# Patient Record
Sex: MALE | Race: WHITE | NOT HISPANIC OR LATINO | Employment: PART TIME | URBAN - METROPOLITAN AREA
[De-identification: names, ages, dates, MRNs, and addresses within clinical notes are randomized per-mention and may not be internally consistent; named-entity substitution may affect disease eponyms.]

---

## 2018-01-26 ENCOUNTER — OFFICE VISIT CONVERTED (OUTPATIENT)
Dept: FAMILY MEDICINE CLINIC | Facility: CLINIC | Age: 53
End: 2018-01-26
Attending: NURSE PRACTITIONER

## 2018-02-14 ENCOUNTER — OFFICE VISIT CONVERTED (OUTPATIENT)
Dept: FAMILY MEDICINE CLINIC | Facility: CLINIC | Age: 53
End: 2018-02-14
Attending: NURSE PRACTITIONER

## 2018-02-16 ENCOUNTER — CONVERSION ENCOUNTER (OUTPATIENT)
Dept: FAMILY MEDICINE CLINIC | Facility: CLINIC | Age: 53
End: 2018-02-16

## 2018-02-16 ENCOUNTER — OFFICE VISIT CONVERTED (OUTPATIENT)
Dept: FAMILY MEDICINE CLINIC | Facility: CLINIC | Age: 53
End: 2018-02-16
Attending: NURSE PRACTITIONER

## 2018-04-13 ENCOUNTER — CONVERSION ENCOUNTER (OUTPATIENT)
Dept: OTOLARYNGOLOGY | Facility: CLINIC | Age: 53
End: 2018-04-13

## 2018-04-13 ENCOUNTER — OFFICE VISIT CONVERTED (OUTPATIENT)
Dept: OTOLARYNGOLOGY | Facility: CLINIC | Age: 53
End: 2018-04-13
Attending: OTOLARYNGOLOGY

## 2018-05-11 ENCOUNTER — OFFICE VISIT CONVERTED (OUTPATIENT)
Dept: OTOLARYNGOLOGY | Facility: CLINIC | Age: 53
End: 2018-05-11
Attending: OTOLARYNGOLOGY

## 2018-06-08 ENCOUNTER — CONVERSION ENCOUNTER (OUTPATIENT)
Dept: CARDIOLOGY | Facility: CLINIC | Age: 53
End: 2018-06-08

## 2018-06-08 ENCOUNTER — OFFICE VISIT CONVERTED (OUTPATIENT)
Dept: CARDIOLOGY | Facility: CLINIC | Age: 53
End: 2018-06-08
Attending: SPECIALIST

## 2018-07-17 ENCOUNTER — CONVERSION ENCOUNTER (OUTPATIENT)
Dept: CARDIOLOGY | Facility: CLINIC | Age: 53
End: 2018-07-17
Attending: SPECIALIST

## 2018-07-23 ENCOUNTER — OFFICE VISIT CONVERTED (OUTPATIENT)
Dept: PULMONOLOGY | Facility: CLINIC | Age: 53
End: 2018-07-23
Attending: INTERNAL MEDICINE

## 2018-10-03 ENCOUNTER — OFFICE VISIT CONVERTED (OUTPATIENT)
Dept: PULMONOLOGY | Facility: CLINIC | Age: 53
End: 2018-10-03
Attending: PHYSICIAN ASSISTANT

## 2018-10-24 ENCOUNTER — OFFICE VISIT CONVERTED (OUTPATIENT)
Dept: FAMILY MEDICINE CLINIC | Facility: CLINIC | Age: 53
End: 2018-10-24
Attending: NURSE PRACTITIONER

## 2018-12-03 ENCOUNTER — OFFICE VISIT CONVERTED (OUTPATIENT)
Dept: PULMONOLOGY | Facility: CLINIC | Age: 53
End: 2018-12-03
Attending: PHYSICIAN ASSISTANT

## 2019-03-01 ENCOUNTER — OFFICE VISIT CONVERTED (OUTPATIENT)
Dept: PULMONOLOGY | Facility: CLINIC | Age: 54
End: 2019-03-01
Attending: INTERNAL MEDICINE

## 2019-05-07 ENCOUNTER — OFFICE VISIT CONVERTED (OUTPATIENT)
Dept: FAMILY MEDICINE CLINIC | Facility: CLINIC | Age: 54
End: 2019-05-07
Attending: NURSE PRACTITIONER

## 2019-05-08 ENCOUNTER — HOSPITAL ENCOUNTER (OUTPATIENT)
Dept: FAMILY MEDICINE CLINIC | Facility: CLINIC | Age: 54
Discharge: HOME | End: 2019-05-08
Attending: NURSE PRACTITIONER

## 2019-05-08 LAB
25(OH)D3 SERPL-MCNC: 41.1 NG/ML (ref 30–100)
ALBUMIN SERPL-MCNC: 4.8 G/DL (ref 3.5–5)
ALBUMIN/GLOB SERPL: 2 {RATIO} (ref 1.4–2.6)
ALP SERPL-CCNC: 51 U/L (ref 56–119)
ALT SERPL-CCNC: 22 U/L (ref 10–40)
ANION GAP SERPL CALC-SCNC: 18 MMOL/L (ref 8–19)
AST SERPL-CCNC: 30 U/L (ref 15–50)
BASOPHILS # BLD AUTO: 0.07 10*3/UL (ref 0–0.2)
BASOPHILS NFR BLD AUTO: 1 % (ref 0–3)
BILIRUB SERPL-MCNC: 0.32 MG/DL (ref 0.2–1.3)
BUN SERPL-MCNC: 9 MG/DL (ref 5–25)
BUN/CREAT SERPL: 10 {RATIO} (ref 6–20)
CALCIUM SERPL-MCNC: 9.5 MG/DL (ref 8.7–10.4)
CHLORIDE SERPL-SCNC: 95 MMOL/L (ref 99–111)
CHOLEST SERPL-MCNC: 177 MG/DL (ref 107–200)
CHOLEST/HDLC SERPL: 2.5 {RATIO} (ref 3–6)
CONV ABS IMM GRAN: 0.03 10*3/UL (ref 0–0.2)
CONV CO2: 27 MMOL/L (ref 22–32)
CONV IMMATURE GRAN: 0.4 % (ref 0–1.8)
CONV TOTAL PROTEIN: 7.2 G/DL (ref 6.3–8.2)
CREAT UR-MCNC: 0.87 MG/DL (ref 0.7–1.2)
DEPRECATED RDW RBC AUTO: 44.7 FL (ref 35.1–43.9)
EOSINOPHIL # BLD AUTO: 0.37 10*3/UL (ref 0–0.7)
EOSINOPHIL # BLD AUTO: 5.1 % (ref 0–7)
ERYTHROCYTE [DISTWIDTH] IN BLOOD BY AUTOMATED COUNT: 12.7 % (ref 11.6–14.4)
FOLATE SERPL-MCNC: 8.1 NG/ML (ref 4.8–20)
GFR SERPLBLD BASED ON 1.73 SQ M-ARVRAT: >60 ML/MIN/{1.73_M2}
GLOBULIN UR ELPH-MCNC: 2.4 G/DL (ref 2–3.5)
GLUCOSE SERPL-MCNC: 77 MG/DL (ref 70–99)
HBA1C MFR BLD: 13.2 G/DL (ref 14–18)
HCT VFR BLD AUTO: 40.1 % (ref 42–52)
HDLC SERPL-MCNC: 72 MG/DL (ref 40–60)
LDLC SERPL CALC-MCNC: 80 MG/DL (ref 70–100)
LYMPHOCYTES # BLD AUTO: 2.01 10*3/UL (ref 1–5)
MCH RBC QN AUTO: 31.5 PG (ref 27–31)
MCHC RBC AUTO-ENTMCNC: 32.9 G/DL (ref 33–37)
MCV RBC AUTO: 95.7 FL (ref 80–96)
MONOCYTES # BLD AUTO: 0.77 10*3/UL (ref 0.2–1.2)
MONOCYTES NFR BLD AUTO: 10.6 % (ref 3–10)
NEUTROPHILS # BLD AUTO: 4 10*3/UL (ref 2–8)
NEUTROPHILS NFR BLD AUTO: 55.2 % (ref 30–85)
NRBC CBCN: 0 % (ref 0–0.7)
OSMOLALITY SERPL CALC.SUM OF ELEC: 279 MOSM/KG (ref 273–304)
PLATELET # BLD AUTO: 454 10*3/UL (ref 130–400)
PMV BLD AUTO: 9.6 FL (ref 9.4–12.4)
POTASSIUM SERPL-SCNC: 4.2 MMOL/L (ref 3.5–5.3)
RBC # BLD AUTO: 4.19 10*6/UL (ref 4.7–6.1)
SODIUM SERPL-SCNC: 136 MMOL/L (ref 135–147)
T4 FREE SERPL-MCNC: 1 NG/DL (ref 0.9–1.8)
TRIGL SERPL-MCNC: 125 MG/DL (ref 40–150)
TSH SERPL-ACNC: 1.58 M[IU]/L (ref 0.27–4.2)
VARIANT LYMPHS NFR BLD MANUAL: 27.7 % (ref 20–45)
VIT B12 SERPL-MCNC: 407 PG/ML (ref 211–911)
VLDLC SERPL-MCNC: 25 MG/DL (ref 5–37)
WBC # BLD AUTO: 7.25 10*3/UL (ref 4.8–10.8)

## 2019-05-10 LAB
B BURGDOR IGG+IGM SER-ACNC: <0.91 ISR (ref 0–0.9)
R RICKETTSI IGG SER QL IA: NORMAL
R RICKETTSI IGG SER QL IA: POSITIVE
R RICKETTSI IGM TITR SER: 0.64 INDEX (ref 0–0.89)

## 2019-06-07 ENCOUNTER — HOSPITAL ENCOUNTER (OUTPATIENT)
Dept: FAMILY MEDICINE CLINIC | Facility: CLINIC | Age: 54
Discharge: HOME | End: 2019-06-07
Attending: NURSE PRACTITIONER

## 2019-06-07 ENCOUNTER — OFFICE VISIT CONVERTED (OUTPATIENT)
Dept: FAMILY MEDICINE CLINIC | Facility: CLINIC | Age: 54
End: 2019-06-07
Attending: NURSE PRACTITIONER

## 2019-06-07 LAB
ALBUMIN SERPL-MCNC: 4.7 G/DL (ref 3.5–5)
ALBUMIN/GLOB SERPL: 1.9 {RATIO} (ref 1.4–2.6)
ALP SERPL-CCNC: 57 U/L (ref 56–119)
ALT SERPL-CCNC: 21 U/L (ref 10–40)
ANION GAP SERPL CALC-SCNC: 17 MMOL/L (ref 8–19)
AST SERPL-CCNC: 28 U/L (ref 15–50)
BILIRUB SERPL-MCNC: 0.34 MG/DL (ref 0.2–1.3)
BUN SERPL-MCNC: 14 MG/DL (ref 5–25)
BUN/CREAT SERPL: 15 {RATIO} (ref 6–20)
CALCIUM SERPL-MCNC: 9.1 MG/DL (ref 8.7–10.4)
CHLORIDE SERPL-SCNC: 100 MMOL/L (ref 99–111)
CONV CO2: 24 MMOL/L (ref 22–32)
CONV TOTAL PROTEIN: 7.2 G/DL (ref 6.3–8.2)
CREAT UR-MCNC: 0.95 MG/DL (ref 0.7–1.2)
GFR SERPLBLD BASED ON 1.73 SQ M-ARVRAT: >60 ML/MIN/{1.73_M2}
GLOBULIN UR ELPH-MCNC: 2.5 G/DL (ref 2–3.5)
GLUCOSE SERPL-MCNC: 84 MG/DL (ref 70–99)
OSMOLALITY SERPL CALC.SUM OF ELEC: 284 MOSM/KG (ref 273–304)
POTASSIUM SERPL-SCNC: 3.8 MMOL/L (ref 3.5–5.3)
SODIUM SERPL-SCNC: 137 MMOL/L (ref 135–147)

## 2019-06-11 LAB
R RICKETTSI IGG SER QL IA: ABNORMAL
R RICKETTSI IGG SER QL IA: POSITIVE
R RICKETTSI IGM TITR SER: 0.6 INDEX (ref 0–0.89)

## 2019-06-19 ENCOUNTER — OFFICE VISIT CONVERTED (OUTPATIENT)
Dept: PULMONOLOGY | Facility: CLINIC | Age: 54
End: 2019-06-19
Attending: PHYSICIAN ASSISTANT

## 2019-11-14 ENCOUNTER — OFFICE VISIT CONVERTED (OUTPATIENT)
Dept: PULMONOLOGY | Facility: CLINIC | Age: 54
End: 2019-11-14
Attending: NURSE PRACTITIONER

## 2020-01-23 ENCOUNTER — HOSPITAL ENCOUNTER (OUTPATIENT)
Dept: FAMILY MEDICINE CLINIC | Facility: CLINIC | Age: 55
Discharge: HOME | End: 2020-01-23
Attending: NURSE PRACTITIONER

## 2020-01-23 ENCOUNTER — OFFICE VISIT CONVERTED (OUTPATIENT)
Dept: FAMILY MEDICINE CLINIC | Facility: CLINIC | Age: 55
End: 2020-01-23
Attending: NURSE PRACTITIONER

## 2020-01-23 LAB
ALBUMIN SERPL-MCNC: 4.6 G/DL (ref 3.5–5)
ALBUMIN/GLOB SERPL: 1.6 {RATIO} (ref 1.4–2.6)
ALP SERPL-CCNC: 63 U/L (ref 56–119)
ALT SERPL-CCNC: 18 U/L (ref 10–40)
ANION GAP SERPL CALC-SCNC: 27 MMOL/L (ref 8–19)
AST SERPL-CCNC: 25 U/L (ref 15–50)
BASOPHILS # BLD AUTO: 0.1 10*3/UL (ref 0–0.2)
BASOPHILS NFR BLD AUTO: 1.5 % (ref 0–3)
BILIRUB SERPL-MCNC: 0.32 MG/DL (ref 0.2–1.3)
BUN SERPL-MCNC: 13 MG/DL (ref 5–25)
BUN/CREAT SERPL: 14 {RATIO} (ref 6–20)
CALCIUM SERPL-MCNC: 9.7 MG/DL (ref 8.7–10.4)
CHLORIDE SERPL-SCNC: 102 MMOL/L (ref 99–111)
CHOLEST SERPL-MCNC: 195 MG/DL (ref 107–200)
CHOLEST/HDLC SERPL: 2.8 {RATIO} (ref 3–6)
CONV ABS IMM GRAN: 0.02 10*3/UL (ref 0–0.2)
CONV CO2: 19 MMOL/L (ref 22–32)
CONV IMMATURE GRAN: 0.3 % (ref 0–1.8)
CONV TOTAL PROTEIN: 7.4 G/DL (ref 6.3–8.2)
CREAT UR-MCNC: 0.9 MG/DL (ref 0.7–1.2)
DEPRECATED RDW RBC AUTO: 47.2 FL (ref 35.1–43.9)
EOSINOPHIL # BLD AUTO: 1.04 10*3/UL (ref 0–0.7)
EOSINOPHIL # BLD AUTO: 15.5 % (ref 0–7)
ERYTHROCYTE [DISTWIDTH] IN BLOOD BY AUTOMATED COUNT: 13.3 % (ref 11.6–14.4)
GFR SERPLBLD BASED ON 1.73 SQ M-ARVRAT: >60 ML/MIN/{1.73_M2}
GLOBULIN UR ELPH-MCNC: 2.8 G/DL (ref 2–3.5)
GLUCOSE SERPL-MCNC: 91 MG/DL (ref 70–99)
HCT VFR BLD AUTO: 42.1 % (ref 42–52)
HDLC SERPL-MCNC: 70 MG/DL (ref 40–60)
HGB BLD-MCNC: 13.7 G/DL (ref 14–18)
LDLC SERPL CALC-MCNC: 109 MG/DL (ref 70–100)
LYMPHOCYTES # BLD AUTO: 1.94 10*3/UL (ref 1–5)
LYMPHOCYTES NFR BLD AUTO: 29 % (ref 20–45)
MCH RBC QN AUTO: 31.1 PG (ref 27–31)
MCHC RBC AUTO-ENTMCNC: 32.5 G/DL (ref 33–37)
MCV RBC AUTO: 95.7 FL (ref 80–96)
MONOCYTES # BLD AUTO: 0.76 10*3/UL (ref 0.2–1.2)
MONOCYTES NFR BLD AUTO: 11.3 % (ref 3–10)
NEUTROPHILS # BLD AUTO: 2.84 10*3/UL (ref 2–8)
NEUTROPHILS NFR BLD AUTO: 42.4 % (ref 30–85)
NRBC CBCN: 0 % (ref 0–0.7)
OSMOLALITY SERPL CALC.SUM OF ELEC: 296 MOSM/KG (ref 273–304)
PLATELET # BLD AUTO: 436 10*3/UL (ref 130–400)
PMV BLD AUTO: 9.4 FL (ref 9.4–12.4)
POTASSIUM SERPL-SCNC: 4.5 MMOL/L (ref 3.5–5.3)
PSA SERPL-MCNC: 1.02 NG/ML (ref 0–4)
RBC # BLD AUTO: 4.4 10*6/UL (ref 4.7–6.1)
SODIUM SERPL-SCNC: 143 MMOL/L (ref 135–147)
TRIGL SERPL-MCNC: 81 MG/DL (ref 40–150)
TSH SERPL-ACNC: 2.36 M[IU]/L (ref 0.27–4.2)
VLDLC SERPL-MCNC: 16 MG/DL (ref 5–37)
WBC # BLD AUTO: 6.7 10*3/UL (ref 4.8–10.8)

## 2020-02-26 ENCOUNTER — OFFICE VISIT CONVERTED (OUTPATIENT)
Dept: PULMONOLOGY | Facility: CLINIC | Age: 55
End: 2020-02-26
Attending: INTERNAL MEDICINE

## 2020-05-22 ENCOUNTER — OFFICE VISIT CONVERTED (OUTPATIENT)
Dept: PULMONOLOGY | Facility: CLINIC | Age: 55
End: 2020-05-22
Attending: PHYSICIAN ASSISTANT

## 2020-07-23 ENCOUNTER — HOSPITAL ENCOUNTER (OUTPATIENT)
Dept: FAMILY MEDICINE CLINIC | Facility: CLINIC | Age: 55
Discharge: HOME | End: 2020-07-23
Attending: NURSE PRACTITIONER

## 2020-07-23 ENCOUNTER — CONVERSION ENCOUNTER (OUTPATIENT)
Dept: OTHER | Facility: HOSPITAL | Age: 55
End: 2020-07-23

## 2020-07-23 ENCOUNTER — OFFICE VISIT CONVERTED (OUTPATIENT)
Dept: FAMILY MEDICINE CLINIC | Facility: CLINIC | Age: 55
End: 2020-07-23
Attending: NURSE PRACTITIONER

## 2020-07-23 LAB
ALBUMIN SERPL-MCNC: 4.5 G/DL (ref 3.5–5)
ALBUMIN/GLOB SERPL: 1.8 {RATIO} (ref 1.4–2.6)
ALP SERPL-CCNC: 70 U/L (ref 56–119)
ALT SERPL-CCNC: 20 U/L (ref 10–40)
ANION GAP SERPL CALC-SCNC: 18 MMOL/L (ref 8–19)
AST SERPL-CCNC: 31 U/L (ref 15–50)
BASOPHILS # BLD AUTO: 0.07 10*3/UL (ref 0–0.2)
BASOPHILS NFR BLD AUTO: 1.2 % (ref 0–3)
BILIRUB SERPL-MCNC: 0.56 MG/DL (ref 0.2–1.3)
BUN SERPL-MCNC: 17 MG/DL (ref 5–25)
BUN/CREAT SERPL: 18 {RATIO} (ref 6–20)
CALCIUM SERPL-MCNC: 9.2 MG/DL (ref 8.7–10.4)
CHLORIDE SERPL-SCNC: 96 MMOL/L (ref 99–111)
CHOLEST SERPL-MCNC: 194 MG/DL (ref 107–200)
CHOLEST/HDLC SERPL: 2.9 {RATIO} (ref 3–6)
CONV ABS IMM GRAN: 0.02 10*3/UL (ref 0–0.2)
CONV CO2: 25 MMOL/L (ref 22–32)
CONV IMMATURE GRAN: 0.3 % (ref 0–1.8)
CONV TOTAL PROTEIN: 7 G/DL (ref 6.3–8.2)
CREAT UR-MCNC: 0.97 MG/DL (ref 0.7–1.2)
DEPRECATED RDW RBC AUTO: 44.5 FL (ref 35.1–43.9)
EOSINOPHIL # BLD AUTO: 0.62 10*3/UL (ref 0–0.7)
EOSINOPHIL # BLD AUTO: 10.6 % (ref 0–7)
ERYTHROCYTE [DISTWIDTH] IN BLOOD BY AUTOMATED COUNT: 13.1 % (ref 11.6–14.4)
GFR SERPLBLD BASED ON 1.73 SQ M-ARVRAT: >60 ML/MIN/{1.73_M2}
GLOBULIN UR ELPH-MCNC: 2.5 G/DL (ref 2–3.5)
GLUCOSE SERPL-MCNC: 82 MG/DL (ref 70–99)
HCT VFR BLD AUTO: 40 % (ref 42–52)
HDLC SERPL-MCNC: 66 MG/DL (ref 40–60)
HGB BLD-MCNC: 13.6 G/DL (ref 14–18)
LDLC SERPL CALC-MCNC: 107 MG/DL (ref 70–100)
LYMPHOCYTES # BLD AUTO: 1.86 10*3/UL (ref 1–5)
LYMPHOCYTES NFR BLD AUTO: 31.8 % (ref 20–45)
MCH RBC QN AUTO: 31.7 PG (ref 27–31)
MCHC RBC AUTO-ENTMCNC: 34 G/DL (ref 33–37)
MCV RBC AUTO: 93.2 FL (ref 80–96)
MONOCYTES # BLD AUTO: 0.69 10*3/UL (ref 0.2–1.2)
MONOCYTES NFR BLD AUTO: 11.8 % (ref 3–10)
NEUTROPHILS # BLD AUTO: 2.58 10*3/UL (ref 2–8)
NEUTROPHILS NFR BLD AUTO: 44.3 % (ref 30–85)
NRBC CBCN: 0 % (ref 0–0.7)
OSMOLALITY SERPL CALC.SUM OF ELEC: 281 MOSM/KG (ref 273–304)
PLATELET # BLD AUTO: 368 10*3/UL (ref 130–400)
PMV BLD AUTO: 9.4 FL (ref 9.4–12.4)
POTASSIUM SERPL-SCNC: 4.3 MMOL/L (ref 3.5–5.3)
RBC # BLD AUTO: 4.29 10*6/UL (ref 4.7–6.1)
SODIUM SERPL-SCNC: 135 MMOL/L (ref 135–147)
TRIGL SERPL-MCNC: 103 MG/DL (ref 40–150)
TSH SERPL-ACNC: 1.69 M[IU]/L (ref 0.27–4.2)
VIT B12 SERPL-MCNC: 474 PG/ML (ref 211–911)
VLDLC SERPL-MCNC: 21 MG/DL (ref 5–37)
WBC # BLD AUTO: 5.84 10*3/UL (ref 4.8–10.8)

## 2021-01-28 ENCOUNTER — TELEPHONE CONVERTED (OUTPATIENT)
Dept: FAMILY MEDICINE CLINIC | Facility: CLINIC | Age: 56
End: 2021-01-28
Attending: NURSE PRACTITIONER

## 2021-04-23 ENCOUNTER — HOSPITAL ENCOUNTER (OUTPATIENT)
Dept: LAB | Facility: HOSPITAL | Age: 56
Discharge: HOME | End: 2021-04-23
Attending: NURSE PRACTITIONER

## 2021-04-23 LAB
ALBUMIN SERPL-MCNC: 5 G/DL (ref 3.5–5)
ALBUMIN/GLOB SERPL: 1.8 {RATIO} (ref 1.4–2.6)
ALP SERPL-CCNC: 60 U/L (ref 56–119)
ALT SERPL-CCNC: 25 U/L (ref 10–40)
ANION GAP SERPL CALC-SCNC: 18 MMOL/L (ref 8–19)
AST SERPL-CCNC: 29 U/L (ref 15–50)
BASOPHILS # BLD AUTO: 0.05 10*3/UL (ref 0–0.2)
BASOPHILS NFR BLD AUTO: 0.6 % (ref 0–3)
BILIRUB SERPL-MCNC: 0.19 MG/DL (ref 0.2–1.3)
BUN SERPL-MCNC: 10 MG/DL (ref 5–25)
BUN/CREAT SERPL: 11 {RATIO} (ref 6–20)
CALCIUM SERPL-MCNC: 9.6 MG/DL (ref 8.7–10.4)
CHLORIDE SERPL-SCNC: 92 MMOL/L (ref 99–111)
CHOLEST SERPL-MCNC: 188 MG/DL (ref 107–200)
CHOLEST/HDLC SERPL: 2.1 {RATIO} (ref 3–6)
CONV ABS IMM GRAN: 0.04 10*3/UL (ref 0–0.2)
CONV CO2: 27 MMOL/L (ref 22–32)
CONV IMMATURE GRAN: 0.5 % (ref 0–1.8)
CONV TOTAL PROTEIN: 7.8 G/DL (ref 6.3–8.2)
CREAT UR-MCNC: 0.92 MG/DL (ref 0.7–1.2)
DEPRECATED RDW RBC AUTO: 44.1 FL (ref 35.1–43.9)
EOSINOPHIL # BLD AUTO: 0.24 10*3/UL (ref 0–0.7)
EOSINOPHIL # BLD AUTO: 3 % (ref 0–7)
ERYTHROCYTE [DISTWIDTH] IN BLOOD BY AUTOMATED COUNT: 13 % (ref 11.6–14.4)
GFR SERPLBLD BASED ON 1.73 SQ M-ARVRAT: >60 ML/MIN/{1.73_M2}
GLOBULIN UR ELPH-MCNC: 2.8 G/DL (ref 2–3.5)
GLUCOSE SERPL-MCNC: 91 MG/DL (ref 70–99)
HCT VFR BLD AUTO: 38 % (ref 42–52)
HDLC SERPL-MCNC: 89 MG/DL (ref 40–60)
HGB BLD-MCNC: 13 G/DL (ref 14–18)
IRON SATN MFR SERPL: 23 % (ref 20–55)
IRON SERPL-MCNC: 94 UG/DL (ref 70–180)
LDLC SERPL CALC-MCNC: 75 MG/DL (ref 70–100)
LYMPHOCYTES # BLD AUTO: 2.94 10*3/UL (ref 1–5)
LYMPHOCYTES NFR BLD AUTO: 37.3 % (ref 20–45)
MCH RBC QN AUTO: 31.6 PG (ref 27–31)
MCHC RBC AUTO-ENTMCNC: 34.2 G/DL (ref 33–37)
MCV RBC AUTO: 92.5 FL (ref 80–96)
MONOCYTES # BLD AUTO: 0.98 10*3/UL (ref 0.2–1.2)
MONOCYTES NFR BLD AUTO: 12.4 % (ref 3–10)
NEUTROPHILS # BLD AUTO: 3.63 10*3/UL (ref 2–8)
NEUTROPHILS NFR BLD AUTO: 46.2 % (ref 30–85)
NRBC CBCN: 0 % (ref 0–0.7)
OSMOLALITY SERPL CALC.SUM OF ELEC: 277 MOSM/KG (ref 273–304)
PLATELET # BLD AUTO: 442 10*3/UL (ref 130–400)
PMV BLD AUTO: 9.2 FL (ref 9.4–12.4)
POTASSIUM SERPL-SCNC: 3.4 MMOL/L (ref 3.5–5.3)
PSA SERPL-MCNC: 0.7 NG/ML (ref 0–4)
RBC # BLD AUTO: 4.11 10*6/UL (ref 4.7–6.1)
SODIUM SERPL-SCNC: 134 MMOL/L (ref 135–147)
TIBC SERPL-MCNC: 406 UG/DL (ref 245–450)
TRANSFERRIN SERPL-MCNC: 284 MG/DL (ref 215–365)
TRIGL SERPL-MCNC: 119 MG/DL (ref 40–150)
TSH SERPL-ACNC: 1.57 M[IU]/L (ref 0.27–4.2)
VLDLC SERPL-MCNC: 24 MG/DL (ref 5–37)
WBC # BLD AUTO: 7.88 10*3/UL (ref 4.8–10.8)

## 2021-04-29 ENCOUNTER — OFFICE VISIT CONVERTED (OUTPATIENT)
Dept: FAMILY MEDICINE CLINIC | Facility: CLINIC | Age: 56
End: 2021-04-29
Attending: NURSE PRACTITIONER

## 2021-04-29 ENCOUNTER — HOSPITAL ENCOUNTER (OUTPATIENT)
Dept: FAMILY MEDICINE CLINIC | Facility: CLINIC | Age: 56
Discharge: HOME | End: 2021-04-29
Attending: NURSE PRACTITIONER

## 2021-04-29 LAB
ALBUMIN SERPL-MCNC: 4.6 G/DL (ref 3.5–5)
ALBUMIN/GLOB SERPL: 1.6 {RATIO} (ref 1.4–2.6)
ALP SERPL-CCNC: 64 U/L (ref 56–119)
ALT SERPL-CCNC: 23 U/L (ref 10–40)
ANION GAP SERPL CALC-SCNC: 16 MMOL/L (ref 8–19)
AST SERPL-CCNC: 27 U/L (ref 15–50)
BASOPHILS # BLD AUTO: 0.07 10*3/UL (ref 0–0.2)
BASOPHILS NFR BLD AUTO: 0.7 % (ref 0–3)
BILIRUB SERPL-MCNC: 0.31 MG/DL (ref 0.2–1.3)
BUN SERPL-MCNC: 11 MG/DL (ref 5–25)
BUN/CREAT SERPL: 11 {RATIO} (ref 6–20)
CALCIUM SERPL-MCNC: 9 MG/DL (ref 8.7–10.4)
CHLORIDE SERPL-SCNC: 94 MMOL/L (ref 99–111)
CONV ABS IMM GRAN: 0.07 10*3/UL (ref 0–0.2)
CONV CO2: 24 MMOL/L (ref 22–32)
CONV IMMATURE GRAN: 0.7 % (ref 0–1.8)
CONV TOTAL PROTEIN: 7.4 G/DL (ref 6.3–8.2)
CREAT UR-MCNC: 1 MG/DL (ref 0.7–1.2)
DEPRECATED RDW RBC AUTO: 44.8 FL (ref 35.1–43.9)
EOSINOPHIL # BLD AUTO: 0.12 10*3/UL (ref 0–0.7)
EOSINOPHIL # BLD AUTO: 1.2 % (ref 0–7)
ERYTHROCYTE [DISTWIDTH] IN BLOOD BY AUTOMATED COUNT: 13.2 % (ref 11.6–14.4)
FOLATE SERPL-MCNC: 7.6 NG/ML (ref 4.8–20)
GFR SERPLBLD BASED ON 1.73 SQ M-ARVRAT: >60 ML/MIN/{1.73_M2}
GLOBULIN UR ELPH-MCNC: 2.8 G/DL (ref 2–3.5)
GLUCOSE SERPL-MCNC: 92 MG/DL (ref 70–99)
HCT VFR BLD AUTO: 38.8 % (ref 42–52)
HGB BLD-MCNC: 13.1 G/DL (ref 14–18)
LYMPHOCYTES # BLD AUTO: 0.7 10*3/UL (ref 1–5)
LYMPHOCYTES NFR BLD AUTO: 7.1 % (ref 20–45)
MCH RBC QN AUTO: 31.2 PG (ref 27–31)
MCHC RBC AUTO-ENTMCNC: 33.8 G/DL (ref 33–37)
MCV RBC AUTO: 92.4 FL (ref 80–96)
MONOCYTES # BLD AUTO: 1.05 10*3/UL (ref 0.2–1.2)
MONOCYTES NFR BLD AUTO: 10.6 % (ref 3–10)
NEUTROPHILS # BLD AUTO: 7.88 10*3/UL (ref 2–8)
NEUTROPHILS NFR BLD AUTO: 79.7 % (ref 30–85)
NRBC CBCN: 0 % (ref 0–0.7)
OSMOLALITY SERPL CALC.SUM OF ELEC: 269 MOSM/KG (ref 273–304)
PLATELET # BLD AUTO: 441 10*3/UL (ref 130–400)
PMV BLD AUTO: 8.9 FL (ref 9.4–12.4)
POTASSIUM SERPL-SCNC: 4 MMOL/L (ref 3.5–5.3)
RBC # BLD AUTO: 4.2 10*6/UL (ref 4.7–6.1)
SODIUM SERPL-SCNC: 130 MMOL/L (ref 135–147)
VIT B12 SERPL-MCNC: 427 PG/ML (ref 211–911)
WBC # BLD AUTO: 9.89 10*3/UL (ref 4.8–10.8)

## 2021-05-01 LAB
B BURGDOR IGG+IGM SER-ACNC: <0.91 ISR (ref 0–0.9)
R RICKETTSI IGM TITR SER: 0.29 INDEX (ref 0–0.89)

## 2021-05-03 LAB
R RICKETTSI IGG SER QL IA: NORMAL
R RICKETTSI IGG SER QL IA: POSITIVE

## 2021-05-04 ENCOUNTER — HOSPITAL ENCOUNTER (OUTPATIENT)
Dept: FAMILY MEDICINE CLINIC | Facility: CLINIC | Age: 56
Discharge: HOME | End: 2021-05-04
Attending: NURSE PRACTITIONER

## 2021-05-05 ENCOUNTER — HOSPITAL ENCOUNTER (OUTPATIENT)
Dept: ULTRASOUND IMAGING | Facility: HOSPITAL | Age: 56
Discharge: HOME | End: 2021-05-05
Attending: NURSE PRACTITIONER

## 2021-05-13 NOTE — PROGRESS NOTES
"   Progress Note      Patient Name: Bentley Martínez   Patient ID: [id number]   Sex: Male   YOB: 1965    Primary Care Provider: Juhi BELL   Referring Provider: Juhi BELL    Visit Date: July 23, 2020    Provider: RAY Serrano   Location: [facility]   Location Address: [address] [place] [address] SUSHILA Li  [id number]   Location Phone: [phone]          Chief Complaint     6 month follow up       History Of Present Illness  Bentley Martínez is a 55 year old /White male who presents for evaluation and treatment of:      refills and fasting labs. He is doing well, still working in maintenance at [employer]. His father in law is living with him now.     HTN, he does not check his bp at home. Denies cp, shortness of breath, etc. on lisinopril and hctz    gerd, Denies current reflux.    asthma, followed by pulm on spiriva, dulera and albuterol. Uses resc. inhaler once or twice every week. He sees pulm in Septmeber, has not started dupixant yet, but plans to    [facility], on lovastatin    allergies, needs refill on zyrtec    alcohol use, usually drinks 8 beers each night.     utd on flu and pneumonia         Past Medical History  Disease Name Date Onset Notes   Allergic rhinitis due to allergen 10/24/2018 --    Asthma --  --    Essential hypertension 10/24/2018 --    GERD --  --    GERD (gastroesophageal reflux disease) 10/24/2018 --    Hernia --  --    High cholesterol --  --    Hyperlipidemia 10/24/2018 --    Hypertension --  --    Laryngeal candidiasis --  --    Voice hoarseness --  --          Past Surgical History  Procedure Name Date Notes   Cyst Removal --  Back of neck   Hernia Repair \"30 years ago\" --          Medication List  Name Date Started Instructions   albuterol sulfate 2.5 mg /3 mL (0.083 %) inhalation solution for nebulization 10/24/2018 inhale 3 milliliters (2.5 mg) by nebulization route 4 times per day as needed   cetirizine 10 mg oral tablet " 01/23/2020 take 1 tablet (10 mg) by oral route once daily   fluticasone 50 mcg/actuation nasal spray,suspension  spray 1 spray (50 mcg) in each nostril by intranasal route once daily   hydrochlorothiazide 25 mg oral tablet 01/23/2020 take 1 tablet (25 mg) by oral route once daily for 30 days   lisinopril 20 mg oral tablet 01/23/2020 take 1 tablet (20 mg) by oral route once daily for 30 days   lovastatin 20 mg oral tablet 01/23/2020 take 1 tablet (20 mg) by oral route once daily with the evening meal for 30 days   montelukast 10 mg oral tablet  take 1 tablet (10 mg) by oral route once daily in the evening   Protonix 40 mg oral tablet,delayed release (DR/EC) 01/23/2020 take 1 tablet (40 mg) by oral route once daily   Spiriva Respimat 1.25 mcg/actuation inhalation mist  inhale 2 puffs (2.5 mcg) by inhalation route once daily   Ventolin HFA 90 mcg/actuation inhalation HFA aerosol inhaler 05/15/2019 INHALE ONE PUFF BY MOUTH EVERY 6 HOURS AS NEEDED         Allergy List  Allergen Name Date Reaction Notes   NO KNOWN DRUG ALLERGIES --  --  --        Allergies Reconciled  Family Medical History  Disease Name Relative/Age Notes   Heart Disease Brother/  Father/  Mother/   --          Social History  Finding Status Start/Stop Quantity Notes   Alcohol Current every day --/-- --  30 servings per week   Tobacco Former --/-- --  Quit 1989         Review of Systems  · Constitutional  o Denies  o : fever, fatigue, weight loss, weight gain  · HENT  o Denies  o : headaches, vertigo, lightheadedness  · Cardiovascular  o Denies  o : lower extremity edema, claudication, chest pressure, palpitations  · Respiratory  o Admits  o : cough  o Denies  o : shortness of breath, hemoptysis, wheezing  · Gastrointestinal  o Denies  o : nausea, vomiting, diarrhea, constipation, abdominal pain  · Psychiatric  o Denies  o : anxiety, depression, suicidal ideation, homicidal ideation      Vitals  Date Time BP Position Site L\R Cuff Size HR RR TEMP (F) WT   "HT  BMI kg/m2 BSA m2 O2 Sat HC       06/07/2019 02:52 /93 Sitting    76 - R 16 98.3 141lbs 3oz 5'  8\" 21.47 1.75 98 %    01/23/2020 07:19 /91 Sitting    81 - R 16  142lbs 8oz 5'  8\" 21.67 1.76 98 %    07/23/2020 08:11 /82 Sitting    79 - R 20 98.3 137lbs 1oz 5'  7\" 21.47 1.71 97 %          Physical Examination  · Constitutional  o Appearance  o : well developed, thn body habitus, no acute distress  · Ears, Nose, Mouth and Throat  o Ears  o :   § External Ears  § : external auditory canal appearance normal, no discharge present  § Otoscopic Examination  § : tympanic membranes pearly white/gray bilaterally  o Nose  o :   § External Nose  § : no lesions noted  § Intranasal Exam  § : nasal mucosa light pink, no intranasal lesions present, nares patent  § Nasopharynx  § : no discharge present  o Oral Cavity  o :   § Oral Mucosa  § : oral mucosa light pink  o Throat  o :   § Oropharynx  § : tonsils without exudate, no palatal petechiae  · Neck  o Inspection/Palpation  o : normal appearance, no masses or tenderness, trachea midline  o Thyroid  o : gland size normal, nontender, no nodules or masses present on palpation  · Respiratory  o Respiratory Effort  o : breathing unlabored  o Inspection of Chest  o : chest rise symmetric bilaterally  o Auscultation of Lungs  o : exp ronchi, clears with coughing  · Cardiovascular  o Heart  o :   § Auscultation of Heart  § : regular rate and rhythm, no murmurs, gallops or rubs  o Peripheral Vascular System  o :   § Extremities  § : no edema  · Lymphatic  o Neck  o : no cervical lymphadenopathy, no supraclavicular lymphadenopathy  · Psychiatric  o Mood and Affect  o : mood normal, affect appropriate              Assessment  · Alcohol abuse     305.00/F10.10  · Allergic rhinitis due to allergen     477.9/J30.9  · Asthma     493.90/J45.909  · Essential hypertension     401.9/I10  · GERD (gastroesophageal reflux " disease)     530.81/K21.9  · Hyperlipidemia     272.4/E78.5  · Screening for colon cancer     V76.51/Z12.11  · Medication monitoring encounter     V58.83/Z51.81      Plan  · Orders  o [facility] (58862) - V76.51/Z12.11 - 07/23/2020  o Physical, Primary Care Panel (CBC, CMP, Lipid, TSH) [facility] (61074, 59870, 53052, 74600) - 401.9/I10, 272.4/E78.5 - 07/23/2020  o ACO-39: Current medications updated and reviewed () - - 07/23/2020  o ACO-14: Influenza immunization was not administered for reasons documented () - - 07/23/2020  o B12 Folate levels (B12FO) - - 07/23/2020  · Medications  o cetirizine 10 mg oral tablet   SIG: take 1 tablet (10 mg) by oral route once daily   DISP: (30) tablet with 5 refills  Refilled on 07/23/2020     o hydrochlorothiazide 25 mg oral tablet   SIG: take 1 tablet (25 mg) by oral route once daily for 30 days   DISP: (30) tablets with 5 refills  Refilled on 07/23/2020     o lisinopril 20 mg oral tablet   SIG: take 1 tablet (20 mg) by oral route once daily for 30 days   DISP: (30) tablet with 5 refills  Refilled on 07/23/2020     o lovastatin 20 mg oral tablet   SIG: take 1 tablet (20 mg) by oral route once daily with the evening meal for 30 days   DISP: (30) tablet with 5 refills  Refilled on 07/23/2020     o Protonix 40 mg oral tablet,delayed release (DR/EC)   SIG: take 1 tablet (40 mg) by oral route once daily   DISP: (30) tablets with 5 refills  Refilled on 07/23/2020     o Spiriva Respimat 1.25 mcg/actuation inhalation mist   SIG: inhale 2 puffs (2.5 mcg) by inhalation route once daily for 30 days   DISP: (60) Mist with 5 refills  Courtesy Refilled on 07/23/2020     · Instructions  o Counseled patient on harms of alcohol misuse and encouraged cessation of alcohol intake (15 minutes).  o Patient advised to monitor blood pressure (B/P) at home and journal readings. Patient informed that a B/P reading at home of more than 130/80 is considered hypertension. For readings greater onct742/90  or higher patient is advised to follow up in the office with readings for management. Patient advised to limit sodium intake.  o Maintain a healthy weight. Avoid tight fitting clothes. Avoid fried, fatty foods, tomato sauce, chocolate, mint, garlic, onion, alcohol. caffeine. Eat smaller meals, dont lie down after a meal, dont smoke. Elevate the head of your bed 6-9 inches.  o Advised that cheeses and other sources of dairy fats, animal fats, fast food, and the extras (candy, pasteries, pies, doughnuts and cookies) all contain LDL raising nutrients. Advised to increase fruits, vegetables, whole grains, and to monitor portion sizes.   o Patient is taking medications as prescribed and doing well.   o Take all medications as prescribed/directed.  o Patient was educated/instructed on their diagnosis, treatment and medications prior to discharge from the clinic today.  o Patient instructed to seek medical attention urgently for new or worsening symptoms.  o Call the office with any concerns or questions.  o Minutes spent with patient including greater than 50% in Education/Counseling/Care Coordination.  o Time spent with the patient was minutes, more than 50% face to face.  o Chronic conditions reviewed and taken into consideration for today's treatment plan.  · Disposition  o Call or Return if symptoms worsen or persist.  o F/u appt in 6 months            Electronically Signed by: RAY Serrano -Author on July 23, 2020 08:38:39 AM

## 2021-05-14 VITALS
WEIGHT: 144.12 LBS | RESPIRATION RATE: 16 BRPM | HEART RATE: 94 BPM | BODY MASS INDEX: 21.84 KG/M2 | TEMPERATURE: 98.3 F | SYSTOLIC BLOOD PRESSURE: 140 MMHG | DIASTOLIC BLOOD PRESSURE: 88 MMHG | HEIGHT: 68 IN | OXYGEN SATURATION: 96 %

## 2021-05-14 NOTE — PROGRESS NOTES
"   Progress Note      Patient Name: Bentley Martínez   Patient ID: [id number]   Sex: Male   YOB: 1965    Primary Care Provider: Juhi BELL   Referring Provider: Juhi BELL    Visit Date: January 28, 2021    Provider: RAY Serrano   Location: [facility]   Location Address: [address] [place] [address]  [id number]   Location Phone: [phone]          Chief Complaint  · follow up      History Of Present Illness  Bentley Martínez is a 55 year old /White male who presents for evaluation and treatment of:   TELEHEALTH TELEPHONE VISIT  Bentley Martínez is a 55 year old /White male who is presenting for evaluation via telehealth telephone visit. Verbal consent obtained before beginning visit.   Provider spent 12 minutes with patient during telehealth visit.   The following staff were present during this visit: gm   Past Medical History/Overview of Patient Symptoms     refills    HTN, he does not check his bp at home. Denies cp, shortness of breath, etc. on lisinopril and hctz    gerd, Denies current reflux. he is on protonix    asthma, followed by pulm on spiriva, dulera and albuterol. He says his breathing is doing better now than it has in a long time    hlp, on lovastatin    allergies, zyrtec    alcohol use, usually drinks 6 beers/night. He had less than that last night           Past Medical History  Disease Name Date Onset Notes   Allergic rhinitis due to allergen 10/24/2018 --    Asthma --  --    Essential hypertension 10/24/2018 --    GERD --  --    GERD (gastroesophageal reflux disease) 10/24/2018 --    Hernia --  --    High cholesterol --  --    Hyperlipidemia 10/24/2018 --    Hypertension --  --    Laryngeal candidiasis --  --    Voice hoarseness --  --          Past Surgical History  Procedure Name Date Notes   Cyst Removal --  Back of neck   Hernia Repair \"30 years ago\" --          Medication List  Name Date " Started Instructions   albuterol sulfate 2.5 mg /3 mL (0.083 %) inhalation solution for nebulization 10/24/2018 inhale 3 milliliters (2.5 mg) by nebulization route 4 times per day as needed   cetirizine 10 mg oral tablet 09/28/2020 take 1 tablet (10 mg) by oral route once daily   fluticasone 50 mcg/actuation nasal spray,suspension  spray 1 spray (50 mcg) in each nostril by intranasal route once daily   hydrochlorothiazide 25 mg oral tablet 09/28/2020 take 1 tablet (25 mg) by oral route once daily for 30 days   lisinopril 20 mg oral tablet 09/28/2020 take 1 tablet (20 mg) by oral route once daily for 30 days   lovastatin 20 mg oral tablet 09/28/2020 take 1 tablet (20 mg) by oral route once daily with the evening meal for 30 days   montelukast 10 mg oral tablet  take 1 tablet (10 mg) by oral route once daily in the evening   Protonix 40 mg oral tablet,delayed release (DR/EC) 09/28/2020 take 1 tablet (40 mg) by oral route once daily   Spiriva Respimat 1.25 mcg/actuation inhalation mist 07/23/2020 inhale 2 puffs (2.5 mcg) by inhalation route once daily for 30 days   Ventolin HFA 90 mcg/actuation inhalation HFA aerosol inhaler 05/15/2019 INHALE ONE PUFF BY MOUTH EVERY 6 HOURS AS NEEDED         Allergy List  Allergen Name Date Reaction Notes   NO KNOWN DRUG ALLERGIES --  --  --        Allergies Reconciled  Family Medical History  Disease Name Relative/Age Notes   Heart Disease Brother/  Father/  Mother/   --          Social History  Finding Status Start/Stop Quantity Notes   Alcohol Current every day --/-- --  30 servings per week   Tobacco Former --/-- --  Quit 1989         Review of Systems  · Constitutional  o Denies  o : fatigue, fever, weight gain, weight loss  · HENT  o Admits  o : seasonal allergies  · Cardiovascular  o Denies  o : chest pain, palpitations, peripheral edema  · Respiratory  o Denies  o : shortness of breath, cough, wheezing  · Psychiatric  o Denies  o : suicidal ideation, homicidal ideation,  anxiety, depression              Assessment  · Alcohol abuse     305.00/F10.10  · Asthma     493.90/J45.909  · Essential hypertension     401.9/I10  · Hyperlipidemia     272.4/E78.5  · GERD     530.81  · Medication monitoring encounter     V58.83/Z51.81  · Screening for prostate cancer     V76.44/Z12.5      Plan  · Orders  o Brief face-to-face behavioral counseling for alcohol misuse, 15 minutes () - 305.00/F10.10 - 01/28/2021  o Male Physical Primary Care Panel (CMP, CBC, TSH, Lipid, PSA) [facility] (46299, 27462, 14767, 39821, 45522, ) - 272.4/E78.5, 401.9/I10 - 01/28/2021  o Physician Telephone Evaluation, 11-20 minutes (34787) - - 01/28/2021  o ACO-39: Current medications updated and reviewed (1159F, ) - - 01/28/2021  · Medications  o cetirizine 10 mg oral tablet   SIG: take 1 tablet (10 mg) by oral route once daily   DISP: (30) Tablet with 5 refills  Refilled on 01/28/2021     o hydrochlorothiazide 25 mg oral tablet   SIG: take 1 tablet (25 mg) by oral route once daily for 30 days   DISP: (30) Tablet with 5 refills  Refilled on 01/28/2021     o lisinopril 20 mg oral tablet   SIG: take 1 tablet (20 mg) by oral route once daily for 30 days   DISP: (30) Tablet with 5 refills  Refilled on 01/28/2021     o lovastatin 20 mg oral tablet   SIG: take 1 tablet (20 mg) by oral route once daily with the evening meal for 30 days   DISP: (30) Tablet with 5 refills  Refilled on 01/28/2021     o Protonix 40 mg oral tablet,delayed release (DR/EC)   SIG: take 1 tablet (40 mg) by oral route once daily   DISP: (30) Tablet with 5 refills  Refilled on 01/28/2021     o Medications have been Reconciled  o Transition of Care or Provider Policy  · Instructions  o Counseled patient on harms of alcohol misuse and encouraged cessation of alcohol intake (15 minutes).  o Patient advised to monitor blood pressure (B/P) at home and journal readings. Patient informed that a B/P reading at home of more than 130/80 is considered  hypertension. For readings greater tvjz941/90 or higher patient is advised to follow up in the office with readings for management. Patient advised to limit sodium intake.  o Advised that cheeses and other sources of dairy fats, animal fats, fast food, and the extras (candy, pastries, pies, doughnuts and cookies) all contain LDL raising nutrients. Advised to increase fruits, vegetables, whole grains, and to monitor portion sizes.   o Plan Of Care: work on cuttin down on the alcohol, will check labs and call with results, I encouraged he have the covid shot when it comes available.   o Chronic conditions reviewed and taken into consideration for today's treatment plan.  o Patient instructed to seek medical attention urgently for new or worsening symptoms.  o Patient is taking medications as prescribed and doing well.   o Patient counseled to stop smoking.  o Call the office with any concerns or questions.  · Disposition  o Call or Return if symptoms worsen or persist.  o F/u appt in 6 months            Electronically Signed by: RAY Serrano -Author on January 28, 2021 01:53:42 PM

## 2021-05-14 NOTE — PROGRESS NOTES
"   Progress Note      Patient Name: Bentley Martínez   Patient ID: [id number]   Sex: Male   YOB: 1965    Primary Care Provider: Juhi BELL   Referring Provider: Juhi BELL    Visit Date: April 29, 2021    Provider: RAY Serrano   Location: [facility]   Location Address: [address] [place], [place] [address]  [place], [place]  [id number]   Location Phone: [phone]          Chief Complaint  · discuss abnormal labs      History Of Present Illness  Bentley Martínez is a 55 year old /White male who presents for evaluation and treatment of:      f/u on anemia, he has been feeling \"ok,\" he is fatigued. hgb was 11, microcytic.     admits to multiple tick bites lately, one wiht a rash on the leg that he thinks might be ringworm, he has hx of rmsf.     he vomited twice this morning, thinks he may be out of his reflx meds, he took a friends' prilosec this morning, denies fever.     c/o right sided testicular swelling off and on, believes he has a hernia, he can push it back in when it starts to swell and the pain will be relieved. Last time was last week. He says he saw someone in [place] for it who told him he did nt have a hernia but that's been a few years ago. He defers on exam because \"it's normal today.\"    asthma, he had just gotten the dupixant, and his power went out and the meds got to warm. he is hoping he can start on that soon.    hypokalemia, he is on hctz, we will recheck labs today.     alcohol use- he says he had 6 beers last night, which is typical for him.       Past Medical History  Disease Name Date Onset Notes   Alcohol abuse 01/28/2021 --    Allergic rhinitis due to allergen 10/24/2018 --    Asthma --  --    Essential hypertension 10/24/2018 --    GERD --  --    GERD (gastroesophageal reflux disease) 10/24/2018 --    Hernia --  --    High cholesterol --  --    Hyperlipidemia 10/24/2018 --    Hypertension --  --    Laryngeal " "candidiasis --  --    Voice hoarseness --  --          Past Surgical History  Procedure Name Date Notes   Cyst Removal --  Back of neck   Hernia Repair \"30 years ago\" --          Medication List  Name Date Started Instructions   albuterol sulfate 2.5 mg /3 mL (0.083 %) inhalation solution for nebulization 10/24/2018 inhale 3 milliliters (2.5 mg) by nebulization route 4 times per day as needed   cetirizine 10 mg oral tablet 01/28/2021 take 1 tablet (10 mg) by oral route once daily   Dulera 100-5 mcg/actuation inhalation HFA aerosol inhaler  --    fluticasone 50 mcg/actuation nasal spray,suspension  spray 1 spray (50 mcg) in each nostril by intranasal route once daily   hydrochlorothiazide 25 mg oral tablet 01/28/2021 take 1 tablet (25 mg) by oral route once daily for 30 days   lisinopril 20 mg oral tablet 01/28/2021 take 1 tablet (20 mg) by oral route once daily for 30 days   lovastatin 20 mg oral tablet 01/28/2021 take 1 tablet (20 mg) by oral route once daily with the evening meal for 30 days   montelukast 10 mg oral tablet  take 1 tablet (10 mg) by oral route once daily in the evening   Protonix 40 mg oral tablet,delayed release (DR/EC) 01/28/2021 take 1 tablet (40 mg) by oral route once daily   Spiriva Respimat 1.25 mcg/actuation inhalation mist 07/23/2020 inhale 2 puffs (2.5 mcg) by inhalation route once daily for 30 days   Ventolin HFA 90 mcg/actuation inhalation HFA aerosol inhaler 05/15/2019 INHALE ONE PUFF BY MOUTH EVERY 6 HOURS AS NEEDED         Allergy List  Allergen Name Date Reaction Notes   NO KNOWN DRUG ALLERGIES --  --  --        Allergies Reconciled  Family Medical History  Disease Name Relative/Age Notes   Heart Disease Brother/  Father/  Mother/   --          Social History  Finding Status Start/Stop Quantity Notes   Alcohol Current every day --/-- --  30 servings per week   Tobacco Former --/-- --  Quit 1989         Immunizations  NameDate Admin Mfg Trade Name Lot Number Route Inj VIS Given VIS " "Publication   COVID [id number]/28/2021 MOD Moderna COVID-19 Vaccine  NE NE 04/29/2021    Comments:    COVID [id number]/01/2021 MOD Moderna COVID-19 Vaccine  NE NE 04/29/2021    Comments:          Review of Systems  · Constitutional  o Admits  o : fatigue  o Denies  o : fever, weight loss, weight gain  · HENT  o Denies  o : headaches, vertigo, lightheadedness  · Cardiovascular  o Denies  o : lower extremity edema, claudication, chest pressure, palpitations  · Respiratory  o Admits  o : cough  o Denies  o : shortness of breath, wheezing, hemoptysis, dyspnea on exertion  · Gastrointestinal  o Admits  o : vomiting  o Denies  o : nausea, diarrhea, constipation, abdominal pain  · Genitourinary  o Admits  o : scrotal pain, scrotal swelling  · Integument  o Admits  o : rash, new skin lesions  · Psychiatric  o Denies  o : anxiety, depression, suicidal ideation, homicidal ideation      Vitals  Date Time BP Position Site L\R Cuff Size HR RR TEMP (F) WT  HT  BMI kg/m2 BSA m2 O2 Sat FR L/min FiO2        04/29/2021 08:32 /88 Sitting    94 - R 16 98.3 144lbs 2oz 5'  8\" 21.91 1.77 96 %            Physical Examination  · Constitutional  o Appearance  o : well developed, well-nourished, no acute distress  · Respiratory  o Respiratory Effort  o : breathing unlabored  o Inspection of Chest  o : chest rise symmetric bilaterally  o Auscultation of Lungs  o : clear to auscultation bilaterally throughout inspiration and expiration  · Cardiovascular  o Heart  o :   § Auscultation of Heart  § : regular rate and rhythm, no murmurs, gallops or rubs  o Peripheral Vascular System  o :   § Extremities  § : no edema  · Lymphatic  o Neck  o : no cervical lymphadenopathy, no supraclavicular lymphadenopathy  · Psychiatric  o Mood and Affect  o : mood normal, affect appropriate     on the inner right knee, there is an annular lesion that is not scaly, scab in the center. massimo 1\" in diameter. He has a scab and red bump on the right hip area that " he says was a tickbite.     gen. exam deferred.               Assessment  · Alcohol abuse     305.00/F10.10  · Anemia     285.9/D64.9  · Asthma     493.90/J45.909  · Fatigue     780.79/R53.83  · GERD (gastroesophageal reflux disease)     530.81/K21.9  · Screening for depression     V79.0/Z13.89  · Tick bites       Bitten or stung by nonvenomous insect and other nonvenomous arthropods, initial encounter     919.4/W57.XXXA  · Vomiting     787.03/R11.10  · Testicular pain     608.9/N50.819  · Testicular swelling, right     608.86/N50.89  · Annular dermatitis     692.9/L30.8      Plan  · Orders  o Brief face-to-face behavioral counseling for alcohol misuse, 15 minutes () - 305.00/F10.10 - 04/29/2021  o ACO-18: Negative screen for clinical depression using a standardized tool () - V79.0/Z13.89 - 04/29/2021  o CBC with Auto Diff [facility] (06025) - - 04/29/2021  o CMP [facility] (80047) - - 04/29/2021  o ACO-39: Current medications updated and reviewed (1159F, ) - - 04/29/2021  o [place] Spotted Fever IgG [facility] (27572) - - 04/29/2021  o [place] Spotted Fever IgM [facility] ([id number]) - - 04/29/2021  o Lyme Disease (IgG/IgM Total by EIA) (95799) - - 04/29/2021  o Fecal Occult Blood Diagnostic (Send to Lab) [facility] (08715) - - 04/29/2021  o B12 Folate levels (B12FO) - - 04/29/2021  o Testicular ultrasound (26947) - - 04/29/2021  · Medications  o Protonix 40 mg oral tablet,delayed release (DR/EC)   SIG: take 1 tablet (40 mg) by oral route once daily   DISP: (30) Tablet with 5 refills  Refilled on 04/29/2021     o Medications have been Reconciled  o Transition of Care or Provider Policy  · Instructions  o Counseled patient on harms of alcohol misuse and encouraged cessation of alcohol intake (15 minutes).  o Maintain a healthy weight. Avoid tight fitting clothes. Avoid fried, fatty foods, tomato sauce, chocolate, mint, garlic, onion, alcohol. caffeine. Eat smaller meals, dont lie down after a meal, dont smoke. Elevate the  head of your bed 6-9 inches.  o Depression Screen completed and scanned into the EMR under the designated folder within the patient's documents.  o Today's PHQ-9 result is __0_  o Take all medications as prescribed/directed.  o Patient was educated/instructed on their diagnosis, treatment and medications prior to discharge from the clinic today.  o Patient instructed to seek medical attention urgently for new or worsening symptoms.  o Call the office with any concerns or questions.  o check for tick borne illness, repeat labs today and call with results, fecal occult since iron panel was normal.   · Disposition  o Call or Return if symptoms worsen or persist.  o F/u appt in 6 months            Electronically Signed by: RAY Serrano -Author on April 29, 2021 10:21:47 AM

## 2021-05-15 VITALS
WEIGHT: 141.19 LBS | RESPIRATION RATE: 16 BRPM | BODY MASS INDEX: 21.4 KG/M2 | HEIGHT: 68 IN | HEART RATE: 76 BPM | TEMPERATURE: 98.3 F | OXYGEN SATURATION: 98 % | SYSTOLIC BLOOD PRESSURE: 153 MMHG | DIASTOLIC BLOOD PRESSURE: 93 MMHG

## 2021-05-15 VITALS
HEART RATE: 81 BPM | HEIGHT: 68 IN | BODY MASS INDEX: 21.6 KG/M2 | DIASTOLIC BLOOD PRESSURE: 91 MMHG | SYSTOLIC BLOOD PRESSURE: 140 MMHG | OXYGEN SATURATION: 98 % | WEIGHT: 142.5 LBS | RESPIRATION RATE: 16 BRPM

## 2021-05-15 VITALS
WEIGHT: 142.37 LBS | HEART RATE: 83 BPM | TEMPERATURE: 98.5 F | DIASTOLIC BLOOD PRESSURE: 76 MMHG | BODY MASS INDEX: 21.58 KG/M2 | SYSTOLIC BLOOD PRESSURE: 112 MMHG | RESPIRATION RATE: 16 BRPM | OXYGEN SATURATION: 98 % | HEIGHT: 68 IN

## 2021-05-15 VITALS
HEART RATE: 79 BPM | BODY MASS INDEX: 21.51 KG/M2 | TEMPERATURE: 98.3 F | OXYGEN SATURATION: 97 % | WEIGHT: 137.06 LBS | DIASTOLIC BLOOD PRESSURE: 82 MMHG | RESPIRATION RATE: 20 BRPM | SYSTOLIC BLOOD PRESSURE: 114 MMHG | HEIGHT: 67 IN

## 2021-05-16 VITALS
RESPIRATION RATE: 17 BRPM | OXYGEN SATURATION: 96 % | DIASTOLIC BLOOD PRESSURE: 90 MMHG | HEIGHT: 68 IN | WEIGHT: 142.5 LBS | SYSTOLIC BLOOD PRESSURE: 145 MMHG | HEART RATE: 101 BPM | TEMPERATURE: 98.6 F | BODY MASS INDEX: 21.6 KG/M2

## 2021-05-16 VITALS
WEIGHT: 147.44 LBS | HEART RATE: 79 BPM | BODY MASS INDEX: 22.35 KG/M2 | DIASTOLIC BLOOD PRESSURE: 75 MMHG | SYSTOLIC BLOOD PRESSURE: 118 MMHG | HEIGHT: 68 IN | RESPIRATION RATE: 20 BRPM | TEMPERATURE: 97.9 F | OXYGEN SATURATION: 96 %

## 2021-05-16 VITALS
RESPIRATION RATE: 16 BRPM | HEART RATE: 97 BPM | DIASTOLIC BLOOD PRESSURE: 71 MMHG | HEIGHT: 68 IN | BODY MASS INDEX: 21.89 KG/M2 | OXYGEN SATURATION: 96 % | WEIGHT: 144.44 LBS | TEMPERATURE: 98.6 F | SYSTOLIC BLOOD PRESSURE: 120 MMHG

## 2021-05-16 VITALS
WEIGHT: 148 LBS | HEART RATE: 82 BPM | HEIGHT: 68 IN | BODY MASS INDEX: 22.43 KG/M2 | DIASTOLIC BLOOD PRESSURE: 80 MMHG | OXYGEN SATURATION: 8 % | SYSTOLIC BLOOD PRESSURE: 122 MMHG

## 2021-05-16 VITALS
WEIGHT: 144 LBS | RESPIRATION RATE: 16 BRPM | SYSTOLIC BLOOD PRESSURE: 127 MMHG | BODY MASS INDEX: 21.82 KG/M2 | OXYGEN SATURATION: 98 % | DIASTOLIC BLOOD PRESSURE: 83 MMHG | TEMPERATURE: 98 F | HEART RATE: 83 BPM | HEIGHT: 68 IN

## 2021-05-16 VITALS
RESPIRATION RATE: 16 BRPM | WEIGHT: 148.31 LBS | HEIGHT: 68 IN | SYSTOLIC BLOOD PRESSURE: 136 MMHG | TEMPERATURE: 98.2 F | OXYGEN SATURATION: 97 % | HEART RATE: 89 BPM | BODY MASS INDEX: 22.48 KG/M2 | DIASTOLIC BLOOD PRESSURE: 86 MMHG

## 2021-05-16 VITALS
HEART RATE: 84 BPM | BODY MASS INDEX: 21.84 KG/M2 | HEIGHT: 68 IN | TEMPERATURE: 97.9 F | SYSTOLIC BLOOD PRESSURE: 133 MMHG | WEIGHT: 144.12 LBS | RESPIRATION RATE: 16 BRPM | DIASTOLIC BLOOD PRESSURE: 93 MMHG | OXYGEN SATURATION: 97 %

## 2021-05-28 VITALS
DIASTOLIC BLOOD PRESSURE: 74 MMHG | BODY MASS INDEX: 26.78 KG/M2 | WEIGHT: 144 LBS | WEIGHT: 145.56 LBS | SYSTOLIC BLOOD PRESSURE: 132 MMHG | HEIGHT: 68 IN | HEIGHT: 68 IN | DIASTOLIC BLOOD PRESSURE: 86 MMHG | RESPIRATION RATE: 16 BRPM | TEMPERATURE: 98.6 F | SYSTOLIC BLOOD PRESSURE: 112 MMHG | HEART RATE: 72 BPM | TEMPERATURE: 98.2 F | SYSTOLIC BLOOD PRESSURE: 118 MMHG | OXYGEN SATURATION: 97 % | BODY MASS INDEX: 21.22 KG/M2 | OXYGEN SATURATION: 97 % | HEART RATE: 91 BPM | BODY MASS INDEX: 21.82 KG/M2 | HEART RATE: 86 BPM | RESPIRATION RATE: 18 BRPM | RESPIRATION RATE: 18 BRPM | DIASTOLIC BLOOD PRESSURE: 70 MMHG | TEMPERATURE: 98.2 F | WEIGHT: 140 LBS | HEIGHT: 62 IN | OXYGEN SATURATION: 95 %

## 2021-05-28 VITALS
TEMPERATURE: 98 F | OXYGEN SATURATION: 97 % | HEIGHT: 68 IN | WEIGHT: 143.25 LBS | RESPIRATION RATE: 16 BRPM | SYSTOLIC BLOOD PRESSURE: 142 MMHG | BODY MASS INDEX: 21.71 KG/M2 | RESPIRATION RATE: 16 BRPM | DIASTOLIC BLOOD PRESSURE: 86 MMHG | BODY MASS INDEX: 21.9 KG/M2 | SYSTOLIC BLOOD PRESSURE: 142 MMHG | HEIGHT: 68 IN | OXYGEN SATURATION: 97 % | HEART RATE: 94 BPM | DIASTOLIC BLOOD PRESSURE: 77 MMHG | TEMPERATURE: 98 F | HEART RATE: 76 BPM | BODY MASS INDEX: 21.27 KG/M2 | SYSTOLIC BLOOD PRESSURE: 131 MMHG | HEIGHT: 68 IN | TEMPERATURE: 98.7 F | WEIGHT: 144.5 LBS | OXYGEN SATURATION: 97 % | RESPIRATION RATE: 14 BRPM | HEART RATE: 73 BPM | DIASTOLIC BLOOD PRESSURE: 89 MMHG | WEIGHT: 140.37 LBS

## 2021-05-28 VITALS
HEIGHT: 68 IN | SYSTOLIC BLOOD PRESSURE: 109 MMHG | RESPIRATION RATE: 14 BRPM | TEMPERATURE: 98.2 F | BODY MASS INDEX: 21.22 KG/M2 | DIASTOLIC BLOOD PRESSURE: 83 MMHG | WEIGHT: 140 LBS | OXYGEN SATURATION: 95 % | HEART RATE: 85 BPM

## 2021-05-28 NOTE — PROGRESS NOTES
Patient: CARRI ANTOINE     Acct: [id number]     Report: #[id number]  UNIT #: [id number]     : 1965    Encounter Date:2019  PRIMARY CARE: ANABELLA BOYD [facility]  ***Signed***  --------------------------------------------------------------------------------------------------------------------  Chief Complaint      Encounter Date      2019            Primary Care Provider      ANABELLA BOYD [facility]            Referring Provider      ANABELLA BOYD [facility]            Patient Complaint      Patient is complaining of      Pt here for 2-3 month follow up/COPD            VITALS      Height 5 ft 8 in / 172.72 cm      Weight 140 lbs 6 oz / 63.256803 kg      BSA 1.76 m2      BMI 21.3 kg/m2      Temperature 98.7 F / 37.06 C - Oral      Pulse 73      Respirations 16      Blood Pressure 142/86 Sitting, Right Arm      Pulse Oximetry 97%, room air      Initial Exhaled Nitrous Oxide      Exhaled Nitrous Oxide Results:  39            HPI      The patient is a very pleasant 53 year old white male patient of Dr. Harris's     also known to me from a previous office visit. He has a history of severe     persistent asthma not well controlled despite triple inhaler therapy with dulera    200 and Spiriva Respimat 2.5 mcg. He has an elevated IgE level, peripheral     eosinophilia, gastroesophageal reflux disease and a history of asthma chronic     obstructive pulmonary disease overlap syndrome. He is already on Singulair,     flonase and zyrtec. We had applied for fasenra for him and he actually met all     the diagnostic criteria that were mentioned in the denial letter that was sent     to us so it is not clear to me why he was denied. Jessy our preauthorization     specialist had discussed the denial with Dr. Harris and he has already been     applied for dupixent. We are waiting to hear if he will be approved for this. In    the meantime, the patient states he has been doing okay right now. He is  not     having an asthma exacerbation currently. He denies any increased increased     dyspnea, coughing or wheezing. We give him prednisone bursts to use periodically    as he needs them and he has had to use them several times in the past 3-4 months    but not currently. He feels his allergies symptoms are well controlled as long     as he continues on his current allergy medications.             I reviewed her Review of Systems, medical, surgical and family history and agree    with those as entered.      Copies To:   Gonzalo Harris      Constitutional:  Denies: Fatigue, Fever, Weight gain, Weight loss, Chills,     Insomnia, Other      Respiratory/Breathing:  Complains of: Shortness of air, Wheezing, Cough; Denies:    Hemoptysis, Pleuritic pain, Other      Endocrine:  Denies: Polydipsia, Polyuria, Heat/cold intolerance, Diabetes, Other      Eyes:  Denies: Blurred vision, Vision Changes, Other      Ears, nose, mouth, throat:  Denies: Congestion, Dysphagia, Hearing Changes, Nose    Bleeding, Nasal Discharge, Throat pain, Tinnitus, Other      Cardiovascular:  Denies: Chest Pain, Exertional dyspnea, Peripheral Edema,     Palpitations, Syncope, Wake up Gasping for air, Orthopnea, Tachycardia, Other      Gastrointestinal:  Denies: Abdominal pain/cramping, Bloody stools, Constipation,    Diarrhea, Melena, Nausea, Vomiting, Other      Genitourinary:  Denies: Dysuria, Urinary frequency, Incontinence, Hematuria,     Urgency, Other      Musculoskeletal:  Denies: Joint Pain, Joint Stiffness, Joint Swelling, Myalgias,    Other      Hematologic/lymphatic:  DENIES: Lymphadenopathy, Bruising, Bleeding tendencies,     Other      Neurologic:  Denies: Headache, Numbness, Weakness, Seizures, Other      Psychiatric:  Denies: Anxiety, Appropriate Effect, Depression, Other      Sleep:  No: Excessive daytime sleep, Morning Headache?, Snoring, Insomnia?, Stop    breathing at sleep?, Other      Integumentary:  Denies: Rash,  Dry skin, Skin Warm to Touch, Other            FAMILY/SOCIAL/MEDICAL HX      Surgical History:  Yes: Abdominal Surgery (HERNIA SURG), Head Surgery (HEAD     INJURY  SUTURED); No: AAA Repair, Angioplasty, Appendectomy, Back Surgery,     Bladder Surgery, Bowel Surgery, Breast Surgery, CABG, Carotid Stenosis,     Cholecystectomy, Ear Surgery, Eye Surgery, Hernia Surgery, Kidney Surgery, Nose     Surgery, Oral Surgery, Orthopedic Surgery, Prostatectomy, Rectal Surgery, Spinal    Surgery, Testicular Surgery, Throat Surgery, Valve Replacement, Vascular     Surgery, Other Surgeries      Heart - Family Hx:  Mother, Father      Cancer/Type - Family Hx:  Father      Is Father Still Living?:  No      Is Mother Still Living?:  No       Family History:  Yes      Social History:  Tobacco Use; No Alcohol Use, No Recreational Drug use      Smoking status:  Former smoker (1 ppd for 15 years quit in 1989 )      Anticoagulation Therapy:  No      Antibiotic Prophylaxis:  No      Medical History:  Yes: Asthma, Hemorrhoids/Rectal Prob (REFLUX), High Blood     Pressure, Shortness Of Breath, Miscellaneous Medical/oth ([place] Spotted    Fever been treated, tested again was negative); No: Alcoholism, Allergies,     Anemia, Arthritis, Blood Disease, Broken Bones, Cataracts, Chemical Dependency,     Chemotherapy/Cancer, Chronic Bronchitis/COPD, Emphysema, Chronic Liver Disease,     Colon Trouble, Colitis, Diverticulitis, Congestive Heart Failu, Deafness or     Ringing Ears, Convulsions, Depression, Anxiety, Bipolar Disorder, Diabetes,     Epilepsy, Seizures, Forgetfullness, Glaucoma, Gall Stones, Gout, Head Injury,     Heart Attack, Heart Murmur, Hepatitis, Hiatal Hernia, High Cholesterol, HIV (Do     not ask - volu, Jaundice, Kidney or Bladder Disease, Kidney Stones, Migrane     Headaches, Mitral Valve Prolapse, Night sweats, Phlebitis, Psychiatric Care,     Reflux Disease, Rheumatic Fever, Sexually Transmitted Dis, Sinus Trouble,  Skin     Disease/Psoriais/Ecz, Stroke, Thyroid Problem, Tuberculosis or Pos TB Te      Psychiatric History      None            PREVENTION      Hx Influenza Vaccination:  No      Influenza Vaccine Declined:  Yes      2 or More Falls Past Year?:  No      Fall Past Year with Injury?:  No      Hx Pneumococcal Vaccination:  No      Encouraged to follow-up with:  PCP regarding preventative exams.      Chart initiated by      Ainsley Loya MA            ALLERGIES/MEDICATIONS      Allergies:        Coded Allergies:             *No Known Allergies (Verified  Allergy, Mild, 6/19/19)      Medications    Last Reconciled on 6/19/19 16:03 by NICOLASA HATCH      predniSONE* (predniSONE*) 20 Mg Tablet      40 MG PO QDAY for 5 Days, #10 TAB 1 Refill         Prov: Arianne Barrios PA-C         6/19/19       Montelukast Sodium (Singulair*) 10 Mg Tab      10 MG PO QDAY, #30 TAB 11 Refills         Prov: Arianne Barrios PA-C         6/19/19       Tiotropium Bromide (Spiriva Respimat 2.5 mcg/Puff) 4 Gm Mist.inhal      2 PUFFS INH QDAY, #1 MDI 11 Refills         Prov: Arianne Barrios PA-C         6/19/19       NEB-Albuterol Sulf (Albuterol) 2.5 Mg/3 Ml Vial.neb      2.5 MG INH Q4H PRN for SHORTNESS OF BREATH, #120 NEB 5 Refills         Prov: Arianne Barrios PA-C         6/19/19       Mometasone/Formoterol (Dulera 200 Mcg/5 Mcg) 13 Gm Hfa.aer.ad      2 PUFFS INH RTBID, #1 MDI 11 Refills         Prov: Arianne Barrios PA-C         6/19/19       MDI-Albuterol (Ventolin HFA) 8 Gm Hfa.aer.ad      2 PUFFS INH Q4-6H PRN for DYSPNEA, #1 INH 5 Refills         Prov: Arianne Barrios PA-C         6/19/19       Jaycob-Fluticasone (Fluticasone 50 mcg) 16 Gm Spray.susp      2 PUFFS NARE EACH QDAY, #1 BOTTLE 9 Refills         Prov: Gonzalo Harris         3/1/19       MDI-Albuterol (Ventolin HFA) 18 Gm Hfa.aer.ad      2 PUFFS INH Q4H PRN for SHORTNESS OF BREATH, #1 INH 0 Refills         Reported         7/23/18       Lisinopril* (Lisinopril*) 20 Mg  Tablet      20 MG PO QDAY, #30 TAB 0 Refills         Reported         7/23/18       Hydrochlorothiazide (Hydrochlorothiazide*) 25 Mg Tablet      25 MG PO QDAY, #30 TAB 0 Refills         Reported         7/23/18       Cetirizine Hcl (CETIRIZINE HCL) 10 Mg Tablet      10 MG PO QDAY, #30 TAB 0 Refills         Reported         7/23/18       Lovastatin (Lovastatin) 20 Mg Tablet      20 MG PO HS for 30 Days, #30 TAB         Reported         7/23/18       Omeprazole (Omeprazole*) 20 Mg Tablet.dr      20 MG PO QDAY, #30 CAP 0 Refills         Reported         7/23/18      Current Medications      Current Medications Reviewed 6/19/19            EXAM      CONSTITUTIONAL: Pleasant male normal conversant.       EYES : Pink conjunctive, no ptosis, PERRL.       ENMT : Nose and ears appear normal, normal dentition, mild posterior pharyngeal     wall erythema, no sinus tenderness. Mallampati classification       Neck: Nontender, no masses, no thyromegaly, no nodules.      Resp : Grossly clear to auscultation, no wheezes, rhonchi or crackles     appreciated, normal work of breathing noted.        CVS  : No carotid bruits, s1s2 nl, RRR, no murmur, rubs or gallop, no peripheral    edema       Chest wall: Normal rise with inspiration, nontender on palpation.      GI   : Abdomen soft, with no masses, no hepatosplenomegaly, no hernias, BS+      MSK  : Normal gait and station, no digital cyanosis or clubbing       Skin : No rashes, ulcerations or lesions, normal turgor and temperature      Neuro: CN II - XII intact, no sensory deficits, DTRs intact and symmetrical, no     motor weakness      Psych: Appropriate affect, A   Vitals      Vitals:             Height 5 ft 8 in / 172.72 cm           Weight 140 lbs 6 oz / 63.011536 kg           BSA 1.76 m2           BMI 21.3 kg/m2           Temperature 98.7 F / 37.06 C - Oral           Pulse 73           Respirations 16           Blood Pressure 142/86 Sitting, Right Arm           Pulse Oximetry  97%, room air            REVIEW      Results Reviewed      PCCS Results Reviewed?:  Yes Prev Lab Results, Yes Prev Radiology Results, Yes     Previous Mecial Records            Assessment      Notes      New Medications      * predniSONE* 20 MG TABLET: 40 MG PO QDAY 5 Days #10      Renewed Medications      * MDI-Albuterol (Ventolin HFA) 8 GM HFA.AER.AD: 2 PUFFS INH Q4-6H PRN DYSPNEA #1      * Mometasone/Formoterol (Dulera 200 Mcg/5 Mcg) 13 GM HFA.AER.AD: 2 PUFFS INH       RTBID #1      * NEB-Albuterol Sulf (Albuterol) 2.5 MG/3 ML VIAL.NEB: 2.5 MG INH Q4H PRN       SHORTNESS OF BREATH #120         Instructions: DIAGNOSIS CODE REQUIRED PRIOR TO PRESCRIBING.      * TIOTROPIUM BROMIDE (Spiriva Respimat 2.5 mcg/Puff) 4 GM MIST.INHAL: 2 PUFFS       INH QDAY #1      * Montelukast Sodium (Singulair*) 10 MG TAB: 10 MG PO QDAY #30      Discontinued Medications      * Jaycob-Fluticasone (Fluticasone 50 mcg) 16 GM SPRAY.SUSP: 1 PUFFS NARE EACH QDAY       #1         Dx: Asthma - J45.909      * predniSONE* 20 MG TABLET: 40 MG PO QDAY #10      ASSESSMENT:      1. Severe persistent asthma not well controlled despite triple inhaler therapy.       2. Asthma chronic obstructive pulmonary disease overlap syndrome.       3. Recurrent asthma exacerbations.       4. Allergic rhinitis.       5. Elevated IgE .       6. Peripheral eosinophilia.       7. Gastroesophageal reflux disease       8. Seasonal allergies.             PLAN:      1. I have discussed in detail with the patient that unfortunately he was denied     for fasenra despite meeting all diagnostic criteria including criteria mentioned    in the denial letter. We have already applied for Dupixent for him instead and     this may even be a better option as he able to inject this to himself at home.       2. I have asked him to call us and ask to speak to Jessy our preauthorization     specialist in 2 weeks if he does not hear back from us about the dupixent.      3.  Continue dulera  200 twice daily, Spiriva Respimat 2.5 mcg 2 puffs once daily    and albuterol as needed. I have sent in refills of these today.       4. I have refilled his Singulair and he can continue flonase and zyrtec for his     seasonal allergies.       5. I have refilled a prednisone burst to use if needed for an asthma ex    acerbation before his next office visit.       6. Follow up in 3 months with Dr. Harris to see how he is doing and hopefully he     will have received several doses of dupixent by then. He can call us sooner if     needed.            Patient Education      Patient Education Provided:  How to use an Inhaler, How to use a Nebulizer      Time Spent:  > 50% /Coord [facility]                 Disclaimer: Converted document may not contain table formatting or lab diagrams. Please see [employer] for the authenticated document.

## 2021-05-28 NOTE — PROGRESS NOTES
Patient: CARRI ANTOINE     Acct: [id number]     Report: #[id number]  UNIT #: [id number]     : 1965    Encounter Date:2020  PRIMARY CARE: ANABELLA BOYD [facility]  ***Signed***  --------------------------------------------------------------------------------------------------------------------  Chief Complaint      Encounter Date      2020            Primary Care Provider      ANABELLA BOYD [facility]            Referring Provider      ANABELLA BOYD [facility]            Patient Complaint      Patient is complaining of      follow up soa            VITALS      Height 5 ft 8 in / 172.72 cm      Weight 140 lbs 0 oz / 63.944848 kg      BSA 1.76 m2      BMI 21.3 kg/m2      Temperature 98.2 F / 36.78 C - Oral      Pulse 86      Respirations 18      Blood Pressure 112/74 Sitting, Right Arm      Pulse Oximetry 95%, room air      Initial Exhaled Nitrous Oxide      Exhaled Nitrous Oxide Results:  39            Repeated Exhaled Nitrous Oxide      Date:  2020      1st Repeated Nitrous Oxide Res:  8            HPI      The patient is a 54 year old male with severe persistent asthma not well     controlled despite triple inhaler therapy, asthma chronic obstructive pulmonary     disease overlap syndrome, peripheral eosinophilia, elevated IgE level, recurrent    asthma exacerbations, allergic rhinitis and gastroesophageal reflux disease. He     is here for follow up today.            Since his last office visit he we applied for Dupixent for him. He has been on     dulera and spiriva and he is on Ventolin. He was also given a prednisone taper,     he finished the course of prednisone and has been doing well. He has not needed     any antibiotics or steroids since his last office visit.  He has been using his     rescue inhaler 1-2 times a week. He has been on Singulair, zyrtec, flonase and     Prilosec. He has no chest pain or chest tightness. He has received the Dupixent     medication but  does not know how to give the shots and is waiting for home     health to help him with that. We checked his FENO today which was normal, in the    past it was 38 on the last office visit.            ROS      Constitutional:  Complains of: Fatigue; Denies: Fever, Weight gain, Weight loss,    Chills, Insomnia, Other      Respiratory/Breathing:  Denies: Shortness of air, Wheezing, Cough, Hemoptysis,     Pleuritic pain, Other      Endocrine:  Denies: Polydipsia, Polyuria, Heat/cold intolerance, Diabetes, Other      Eyes:  Denies: Blurred vision, Vision Changes, Other      Ears, nose, mouth, throat:  Denies: Mouth lesions, Thrush, Throat pain, Hoars    eness, Allergies/Hay Fever, Post Nasal Drip, Headaches, Recent Head Injury, Nose    Bleeding, Neck Stiffness, Thyroid Mass, Hearing Loss, Ear Fullness, Dry Mouth,     Nasal or Sinus Pain, Dry Lips, Nasal discharge, Nasal congestion, Other      Cardiovascular:  Denies: Palpitations, Syncope, Claudication, Chest Pain, Wake     up Gasping for air, Leg Swelling, Irregular Heart Rate, Cyanosis, Dyspnea on     Exertion, Other      Gastrointestinal:  Denies: Nausea, Constipation, Diarrhea, Abdominal pain,     Vomiting, Difficulty Swallowing, Reflux/Heartburn, Dysphagia, Jaundice,     Bloating, Melena, Bloody stools, Other      Genitourinary:  Denies: Urinary frequency, Incontinence, Hematuria, Urgency,     Nocturia, Dysuria, Testicular problems, Other      Musculoskeletal:  Denies: Joint Pain, Joint Stiffness, Joint Swelling, Myalgias,    Other      Hematologic/lymphatic:  DENIES: Lymphadenopathy, Bruising, Bleeding tendencies,     Other      Neurological:  Denies: Headache, Numbness, Weakness, Seizures, Other      Psychiatric:  Denies: Anxiety, Appropriate Effect, Depression, Other      Sleep:  No: Excessive daytime sleep, Morning Headache?, Snoring, Insomnia?, Stop    breathing at sleep?, Other      Integumentary:  Denies: Rash, Dry skin, Skin Warm to Touch, Other       Immunologic/Allergic:  Denies: Latex allergy, Seasonal allergies, Asthma,     Urticaria, Eczema, Other      Immunization status:  No: Up to date            FAMILY/SOCIAL/MEDICAL HX      Surgical History:  Yes: Abdominal Surgery (HERNIA SURG), Head Surgery (HEAD     INJURY  SUTURED); No: AAA Repair, Angioplasty, Appendectomy, Back Surgery,     Bladder Surgery, Bowel Surgery, Breast Surgery, CABG, Carotid Stenosis,     Cholecystectomy, Ear Surgery, Eye Surgery, Hernia Surgery, Kidney Surgery, Nose     Surgery, Oral Surgery, Orthopedic Surgery, Prostatectomy, Rectal Surgery, Spinal    Surgery, Testicular Surgery, Throat Surgery, Valve Replacement, Vascular     Surgery, Other Surgeries      Heart - Family Hx:  Mother, Father      Cancer/Type - Family Hx:  Father      Is Father Still Living?:  No      Is Mother Still Living?:  No       Family History:  Yes      Social History:  Tobacco Use; No Alcohol Use, No Recreational Drug use      Smoking status:  Former smoker (1 ppd for 15 years quit in 1989 )      Anticoagulation Therapy:  No      Antibiotic Prophylaxis:  No      Medical History:  Yes: Asthma, Hemorrhoids/Rectal Prob (REFLUX), High Blood     Pressure, Shortness Of Breath, Miscellaneous Medical/oth ([place] Spotted    Fever been treated, tested again was negative); No: Alcoholism, Allergies,     Anemia, Arthritis, Blood Disease, Broken Bones, Cataracts, Chemical Dependency,     Chemotherapy/Cancer, Chronic Bronchitis/COPD, Emphysema, Chronic Liver Disease,     Colon Trouble, Colitis, Diverticulitis, Congestive Heart Failu, Deafness or     Ringing Ears, Convulsions, Depression, Anxiety, Bipolar Disorder, Diabetes,     Epilepsy, Seizures, Forgetfullness, Glaucoma, Gall Stones, Gout, Head Injury,     Heart Attack, Heart Murmur, Hepatitis, Hiatal Hernia, High Cholesterol, HIV (Do     not ask - volu, Jaundice, Kidney or Bladder Disease, Kidney Stones, Migrane     Headaches, Mitral Valve Prolapse, Night sweats,  Phlebitis, Psychiatric Care,     Reflux Disease, Rheumatic Fever, Sexually Transmitted Dis, Sinus Trouble, Skin     Disease/Psoriais/Ecz, Stroke, Thyroid Problem, Tuberculosis or Pos TB Te      Psychiatric History      none            PREVENTION      Hx Influenza Vaccination:  Yes      Date Influenza Vaccine Given:  Nov 1, 2019      Influenza Vaccine Declined:  No      2 or More Falls Past Year?:  No      Fall Past Year with Injury?:  No      Hx Pneumococcal Vaccination:  No      Encouraged to follow-up with:  PCP regarding preventative exams.      Chart initiated by      brittney gardner ma            ALLERGIES/MEDICATIONS      Allergies:        Coded Allergies:             *No Known Allergies (Verified  Allergy, Mild, 2/26/20)      Medications    Last Reconciled on 2/26/20 17:01 by GONZALO HARRIS MD      Montelukast Sodium (Singulair*) 10 Mg Tab      10 MG PO QDAY, #30 TAB 11 Refills         Prov: Gonzalo Harris         2/26/20       Tiotropium Bromide (Spiriva Respimat 2.5 mcg/Puff) 4 Gm Mist.inhal      2 PUFFS INH QDAY, #1 MDI 11 Refills         Prov: Gonzalo Harris         2/26/20       NEB-Albuterol Sulf (Albuterol) 2.5 Mg/3 Ml Vial.neb      2.5 MG INH Q4H PRN for SHORTNESS OF BREATH, #120 NEB 5 Refills         Prov: Gonzalo Harris         2/26/20       Mometasone/Formoterol (Dulera 200 Mcg/5 Mcg) 13 Gm Hfa.aer.ad      2 PUFFS INH RTBID, #1 MDI 11 Refills         Prov: Gonzalo Harris         2/26/20       MDI-Albuterol (Ventolin HFA) 8 Gm Hfa.aer.ad      2 PUFFS INH Q4-6H PRN for DYSPNEA, #1 INH 5 Refills         Prov: Gonzalo Harris         2/26/20       Jaycob-Fluticasone (Fluticasone 50 mcg) 16 Gm Spray.susp      2 PUFFS NARE EACH QDAY, #1 BOTTLE 9 Refills         Prov: Gonzalo Harris         2/26/20       MDI-Albuterol (Ventolin HFA) 18 Gm Hfa.aer.ad      2 PUFFS INH Q4H PRN for SHORTNESS OF BREATH, #1 INH 0 Refills         Reported         7/23/18       Lisinopril* (Lisinopril*) 20 Mg Tablet      20 MG PO QDAY, #30 TAB 0  Refills         Reported         7/23/18       Hctz (hydroCHLOROthiazide) 25 Mg Tablet      25 MG PO QDAY, #30 TAB 0 Refills         Reported         7/23/18       Cetirizine Hcl (CETIRIZINE HCL) 10 Mg Tablet      10 MG PO QDAY, #30 TAB 0 Refills         Reported         7/23/18       Lovastatin (Lovastatin) 20 Mg Tablet      20 MG PO HS for 30 Days, #30 TAB         Reported         7/23/18       Omeprazole (Omeprazole*) 20 Mg Tablet.dr      20 MG PO QDAY, #30 CAP 0 Refills         Reported         7/23/18      Current Medications      Current Medications Reviewed 2/26/20            EXAM      CONSTITUTIONAL: Pleasant male in no acute distress,  normal conversant.       EYES : Pink conjunctive, no ptosis, PERRL.       ENMT : Nose and ears appear normal, normal dentition, mild posterior pharyngeal     wall erythema. Mallampati classification II, no sinus tenderness.       Neck: Nontender, no masses, no thyromegaly, no nodules.      Resp : Bilateral diminished  breath sounds, resonant to percussion bilaterally,     no wheezing, no crackles or rhonchi.      CVS  : No carotid bruits, s1s2 nl, RRR, no murmur, rubs or gallop, no peripheral    edema       Chest wall: Normal rise with inspiration, nontender on palpation      GI   : Abdomen soft, with no masses, no hepatosplenomegaly, no hernias, BS+      MSK  : Normal gait and station, no digital cyanosis or clubbing       Skin : No rashes, ulcerations or lesions, normal turgor and temperature      Neuro: CN II - XII intact, no sensory deficits, DTRs intact and symmetrical, no     motor weakness      Psych: Appropriate affect, A   Vtials      Vitals:             Height 5 ft 8 in / 172.72 cm           Weight 140 lbs 0 oz / 63.840906 kg           BSA 1.76 m2           BMI 21.3 kg/m2           Temperature 98.2 F / 36.78 C - Oral           Pulse 86           Respirations 18           Blood Pressure 112/74 Sitting, Right Arm           Pulse Oximetry 95%, room air             REVIEW      Results Reviewed      PCCS Results Reviewed?:  Yes Prev Lab Results, Yes Prev Radiology Results, Yes     Previous Mecial Records      Radiographic Results               [facility]                PACS RADIOLOGY REPORT            Patient: CARRI ANTOINE   Acct: #[id number]   Report: #[id number]            UNIT #: [id number]    DOS: 18      INSURANCE:BLUE MEDICAID   ORDER #:[id number]      LOCATION:ER     : 1965            PROVIDERS      ADMITTING:     ATTENDING:       FAMILY:     ORDERING:  LAURENT KILGORE         OTHER:    DICTATING:  Lloyd Freeman MD            REQ #:[id number]   EXAM:CXR1 - CHEST 1 View AP PA      REASON FOR EXAM:  Chest Pain      REASON FOR VISIT:  CP/CHRISTIANNE SHOULDER COMPLAINT/NECK COMPLAINT            *******Signed******         PROCEDURE:   CHEST AP/PA SINGLE VIEW             COMPARISON:   [facility], , CHEST PA/AP   11:21.             INDICATIONS:   chest pains, short of air, today             FINDINGS:         The heart is normal in size. The lungs are well-expanded and free of infiltrate    s.             Bony structures appear intact.             CONCLUSION:   No active disease is seen.              LLOYD FREEMAN MD             Electronically Signed and Approved By: LLOYD FREEMAN MD on 2018 at 19:48                           Until signed, this is an unconfirmed preliminary report that may contain      errors and is subject to change.                                              COUST:      D:18      PFT Results      [id number]  [id number]                                 [facility]                          [employer] Services                            [place], [place]  [address]               __________________________________________________________________________             Patient Name:                   Attending  Physician:      Bentley Martínez M.D.             Patient Visit # MR #            Admit Date  Disch Date     Location      [id number]    [id number]      09/25/2018                 [facility]- -             Date of Birth      1965      __________________________________________________________________________      0821 - DIAGNOSTIC REPORT             PULMONARY FUNCTION TEST             DATE OF STUDY:      09/25/2018             SPIROMETRY:      Spirometry shows moderate obstructive defect.      FEV1/FVC is 64.      FEV1 is 2.41 L, 67% of predicted.      FVC is 3.75 L, 80% of predicted.             BRONCHODILATOR RESPONSE:      There is a significant response to bronchodilator administration. FEV1      increased from 2.41 L to 3.21 L, 33% change. FVC increased from 3.75 to 4.38      L, 17% change.             LUNG VOLUMES:      Lung volumes show air trapping.      Total lung capacity is 6.95 L, 105% of predicted.      Residual volume is 3.10 L, 153% of predicted.             DIFFUSION:      Diffusion capacity is normal, 90% of predicted.             FLOW VOLUME LOOP:      Flow volume loop is suggestive of obstructive process.             CONCLUSION:      Low FEV1/FVC with low FEV1, air trapping and normal diffusion capacity. It      suggest moderate obstructive airway disease, likely chronic bronchitis      phenotype or obstructive asthma.  Please correlate clinically.             To be electronically signed in [employer]      [id number] BELGICA HARRIS M.D.             NK:ts      D:  10/15/2018 04:51      T:  10/15/2018 14:39      #[id number]             Until signed, this is an unconfirmed preliminary report that may contain      errors and is subject to change.                   10/15/18 1957  <Electronically signed by Belgica Harris MD>            Assessment      Notes      Renewed Medications      * Jaycob-Fluticasone (Fluticasone 50 mcg) 16 GM SPRAY.SUSP: 2 PUFFS NARE EACH QDAY       #1      *  MDI-Albuterol (Ventolin HFA) 8 GM HFA.AER.AD: 2 PUFFS INH Q4-6H PRN DYSPNEA #1      * Mometasone/Formoterol (Dulera 200 Mcg/5 Mcg) 13 GM HFA.AER.AD: 2 PUFFS INH       RTBID #1      * NEB-Albuterol Sulf (Albuterol) 2.5 MG/3 ML VIAL.NEB: 2.5 MG INH Q4H PRN       SHORTNESS OF BREATH #120         Instructions: DIAGNOSIS CODE REQUIRED PRIOR TO PRESCRIBING.      * TIOTROPIUM BROMIDE (Spiriva Respimat 2.5 mcg/Puff) 4 GM MIST.INHAL: 2 PUFFS       INH QDAY #1      * Montelukast Sodium (Singulair*) 10 MG TAB: 10 MG PO QDAY #30      Discontinued Medications      * predniSONE 20 MG TABLET: 40 MG PO QDAY 5 Days #10      PLAN:      The patient is a 54 year old male with severe persistent asthma not well c    ontrolled despite triple inhaler therapy, asthma chronic obstructive pulmonary     disease overlap syndrome, peripheral eosinophilia, elevated IgE, recurrent     asthma exacerbations, allergic rhinitis and gastroesophageal reflux disease.             1. Severe persistent asthma. Continue with dulera and spiriva and Ventolin as     needed. Continue Dupixent, he has the medication at home and we will try to     arrange for home health to help him education for Dupixent shots. Continue with     Singulair, zyrtec and flonase. Continue prednisone burst if needed for asthma     exacerbation. Continue Prilosec.       2. I will follow up with him in 3 months, earlier if needed.            Patient Education      Education resources provided:  Yes      Patient Education Provided:  Acute Bronchitis            Electronically signed by Gonzalo Harris  03/22/2020 12:18       Disclaimer: Converted document may not contain table formatting or lab diagrams. Please see [employer] for the authenticated document.

## 2021-05-28 NOTE — PROGRESS NOTES
Patient: CARRI MARTÍNEZ     Acct: [id number]     Report: #[id number]  UNIT #: [id number]     : 1965    Encounter Date:2020  PRIMARY CARE: ANABELLA BOYD [facility]  ***Signed***  --------------------------------------------------------------------------------------------------------------------  TELEHEALTH NOTE      History of Present Illness            Chief Complaint: f/u            Carri Martínez is presenting for evaluation via Telehealth visit by phone.     Verbal consent obtained before beginning visit.            Provider spent (12) minutes with the patient during telehealth visit.            The following staff were present during the visit: Sri Mac MA, Arianne robles PA-C            The patient is a pleasant 54 year old male patient of Dr. Harris's, also known to     me from previous office visits. He was last seen by Dr. Harris in 2020 and     states he has done very well since then. He has had severe persistent asthma,     asthma/COPD overlap syndrome, peripheral eosinophilia, elevated IgE level and     history of allergic rhinitis, gastroesophageal reflux disease and recurrent     asthma exacerbations. We had written for Dupixent for him a while ago and he has     received it, but never had taken the first dose.  He did not receive Dupixent     teaching, so Dr. Harris wrote for that the last time and Tatiana contacted the [employer] [facility] and gave him the contact number to be set up for a nurse to come out and     do the teaching. Due to COVID19 pandemic, the patient was nervous about having     anyone come out to his house, so he never called the number and still has not     taken the Dupixent. He denies having any asthma exacerbations in the past at     least three months, maybe longer. He current denies any increased dyspnea, cough     or wheezing, denies hemoptysis, fever or chills. His only complaint is of some     postnasal drip and sneezing with runny nose despite taking  Singulair and Zyrtec     everyday.            I have reviewed ROS, medical, surgical and family history and agree with those     as entered in the chart.  I personally reviewed previous lab work, imaging and     provider notes.                           Past Med History      Copd      Former smoker (1 ppd for 15 years quit in 1989 )      Flu current      Pneumonia Not current      Overview of Symptoms      Pt complains of allergies            Allergies/Medications      Allergies:        Coded Allergies:             *No Known Allergies (Verified  Allergy, Mild, 5/22/20)      Medications    Last Reconciled on 5/22/20 14:34 by NICOLASA HATCH      Montelukast Sodium (Singulair*) 10 Mg Tab      10 MG PO QDAY, #30 TAB 11 Refills         Prov: Gonzalo Harris         2/26/20       Tiotropium Bromide (Spiriva Respimat 2.5 mcg/Puff) 4 Gm Mist.inhal      2 PUFFS INH QDAY, #1 MDI 11 Refills         Prov: Gonzalo Harris         2/26/20       NEB-Albuterol Sulf (Albuterol) 2.5 Mg/3 Ml Vial.neb      2.5 MG INH Q4H PRN for SHORTNESS OF BREATH, #120 NEB 5 Refills         Prov: Gonzalo Harris         2/26/20       Mometasone/Formoterol (Dulera 200 Mcg/5 Mcg) 13 Gm Hfa.aer.ad      2 PUFFS INH RTBID, #1 MDI 11 Refills         Prov: Gonzalo Harris         2/26/20       MDI-Albuterol (Ventolin HFA) 8 Gm Hfa.aer.ad      2 PUFFS INH Q4-6H PRN for DYSPNEA, #1 INH 5 Refills         Prov: Gonzalo Harris         2/26/20       Lisinopril* (Lisinopril*) 20 Mg Tablet      20 MG PO QDAY, #30 TAB 0 Refills         Reported         7/23/18       Hctz (hydroCHLOROthiazide) 25 Mg Tablet      25 MG PO QDAY, #30 TAB 0 Refills         Reported         7/23/18       Cetirizine Hcl (CETIRIZINE HCL) 10 Mg Tablet      10 MG PO QDAY, #30 TAB 0 Refills         Reported         7/23/18       Lovastatin (Lovastatin) 20 Mg Tablet      20 MG PO HS for 30 Days, #30 TAB         Reported         7/23/18       Omeprazole (Omeprazole*) 20 Mg Tablet.dr      20 MG PO QDAY, #30  CAP 0 Refills         Reported         7/23/18            Plan/Instructions      Ambulatory Assessment/Plan:        Notes      New Medications      * AZELASTINE HCL (Azelastine Nasal) 137 MCG/0.137 ML SPRAY.PUMP: 1 PUFFS NARE       EACH QDAY #1      Discontinued Medications      * Jaycob-Fluticasone (Fluticasone 50 mcg) 16 GM SPRAY.SUSP: 2 PUFFS NARE EACH QDAY       #1      Plan/Instructions            * Plan Of Care: ()            * Chronic conditions reviewed and taken into consideration for today's treatment       plan.      * Patient instructed to seek medical attention urgently for new or worsening       symptoms.      * Patient was educated/instructed on their diagnosis, treatment and medications       prior to discharge from the clinic today.            ASSESSMENT:       1. Severe persistent allergic asthma without acute exacerbation, has received     Dupixent, but not yet taken this.      2. Asthma/COPD overlap syndrome without acute exacerbation.      3. Peripheral eosinophilia.      4. Elevated IgE level.      5. History of recurrent asthma exacerbations.      6.  Allergic rhinitis, currently not well-controlled.      7.  Gastroesophageal reflux disease.            PLAN:      1. At this time I have discussed with patient that I would like him to go ahead     and call the number for the Dupixent help line to see if they have any social     distancing options such as a video or resources to teach him how to give himself     the Dupixent shots without having a nurse come into his home. He is willing to     try this and would like to start the medication.        2.  I will have him continue on Dulera and Spiriva as prescribed and Ventolin as     needed which he needs about twice a week on average.  He should continue     Singulair and Zyrtec as prescribed. He did not think that Flonase helped him ve    ry much in the past, so I have prescribed Astelin for him to use as needed.  He     has not needed to take his  Prednisone burst that he has on hand for several     months. He should continue Prilosec.      3. Follow up in four months with Dr. Harris, sooner if needed.      Codes:  Phone Eval 11-20 mi [id number]            Electronically signed by TIFF IVERSON PA-C  05/26/2020 13:21       Disclaimer: Converted document may not contain table formatting or lab diagrams. Please see [employer] for the authenticated document.

## 2021-05-28 NOTE — PROGRESS NOTES
Patient: CARRI ANTOINE     Acct: [id number]     Report: #[id number]  UNIT #: [id number]     : 1965    Encounter Date:10/03/2018  PRIMARY CARE: ANABELLA BOYD [facility]  ***Signed***  --------------------------------------------------------------------------------------------------------------------  Chief Complaint      Encounter Date      Oct 3, 2018            Primary Care Provider      ANABELLA BOYD [facility]            Referring Provider      ANABELLA BOYD [facility]            Patient Complaint      Patient is complaining of      Fpt / 6 Min Walk Results            VITALS      Height 5 ft 8 in / 172.72 cm      Weight 143 lbs 4 oz / 64.317215 kg      BSA 1.77 m2      BMI 21.8 kg/m2      Temperature 98.02 F / 36.68 C - Oral      Pulse 94      Respirations 16      Blood Pressure 142/89 Sitting, Right Arm      Pulse Oximetry 97%, room air      Exhaled Nitrous Oxide Testin            HPI      The patient is a pleasant 53-year-old white male who was seen by Dr. Harris as a     new patient on 2018 for evaluation of his asthma. He has a history of     recurrent bronchitis and frequent asthma exacerbations and at his last visit,     his Dulera was changed to Symbicort and Spiriva Respimat 1.25 was added for him.    However, the patient did not understand that he was to take both the Symbicort     and the Spiriva so he has only been taking the Spiriva. He tells me that he     never received the Symbicort, it looks like it was changed to Breo due to     insurance coverage reasons, but he never received the Breo either. He stopped by    our office yesterday and was told that he should take both the LABA/ICS plus     LAMA therapy, so he started back on the Dulera 200 along with the Spiriva. He     feels like he has been doing badly for about a week or so, complaining of     increased coughing, wheezing, and dyspnea. He is coughing up white sputum,     denies any purulent sputum production,  denies hemoptysis, fever, or chills. He     is using his albuterol nebs frequently, at least 4x during the day and tells me     he has even been waking up at night sometimes and needing to use his albuterol.     I have done a FeNO for him in the office today which was significantly elevated     at level of 118. At his last visit, his FeNO was 39. He has not been using an     inhaled steroid for the past several months as mentioned because he was confused    about which inhalers to take.             I have reviewed his review of systems, medical, surgical, and family history and    agree with those as entered.            ROS      Constitutional:  Denies: Fatigue, Fever, Weight gain, Weight loss, Chills,     Insomnia, Other      Respiratory/Breathing:  Complains of: Shortness of air, Wheezing, Cough; Denies:    Hemoptysis, Pleuritic pain, Other      Endocrine:  Denies: Polydipsia, Polyuria, Heat/cold intolerance, Diabetes, Other      Eyes:  Denies: Blurred vision, Vision Changes, Other      Ears, nose, mouth, throat:  Denies: Congestion, Dysphagia, Hearing Changes, Nose    Bleeding, Nasal Discharge, Throat pain, Tinnitus, Other      Cardiovascular:  Denies: Chest Pain, Exertional dyspnea, Peripheral Edema,     Palpitations, Syncope, Wake up Gasping for air, Orthopnea, Tachycardia, Other      Gastrointestinal:  Denies: Abdominal pain/cramping, Bloody stools, Constipation,    Diarrhea, Melena, Nausea, Vomiting, Other      Genitourinary:  Denies: Dysuria, Urinary frequency, Incontinence, Hematuria,     Urgency, Other      Musculoskeletal:  Denies: Joint Pain, Joint Stiffness, Joint Swelling, Myalgias,    Other      Hematologic/lymphatic:  DENIES: Lymphadenopathy, Bruising, Bleeding tendencies,     Other      Neurologic:  Denies: Headache, Numbness, Weakness, Seizures, Other      Psychiatric:  Denies: Anxiety, Appropriate Effect, Depression, Other      Sleep:  Yes: Insomnia?; No: Excessive daytime sleep, Morning  Headache?, Snoring,    Stop breathing at sleep?, Other      Integumentary:  Denies: Rash, Dry skin, Skin Warm to Touch, Other            FAMILY/SOCIAL/MEDICAL HX      Surgical History:  Yes: Abdominal Surgery (HERNIA SURG), Head Surgery (HEAD     INJURY  SUTURED); No: AAA Repair, Angioplasty, Appendectomy, Back Surgery,     Bladder Surgery, Bowel Surgery, Breast Surgery, CABG, Carotid Stenosis,     Cholecystectomy, Ear Surgery, Eye Surgery, Hernia Surgery, Kidney Surgery, Nose     Surgery, Oral Surgery, Orthopedic Surgery, Prostatectomy, Rectal Surgery, Spinal    Surgery, Testicular Surgery, Throat Surgery, Valve Replacement, Vascular     Surgery, Other Surgeries      Heart - Family Hx:  Mother, Father      Cancer/Type - Family Hx:  Father      Is Father Still Living?:  No      Is Mother Still Living?:  No       Family History:  Yes      Social History:  Tobacco Use; No Alcohol Use, No Recreational Drug use      Smoking status:  Former smoker (1 ppd for 15 years quit in 1989 )      Anticoagulation Therapy:  No      Antibiotic Prophylaxis:  No      Medical History:  Yes: Asthma, Hemorrhoids/Rectal Prob (REFLUX), High Blood     Pressure, Shortness Of Breath; No: Alcoholism, Allergies, Anemia, Arthritis,     Blood Disease, Broken Bones, Cataracts, Chemical Dependency,     Chemotherapy/Cancer, Chronic Bronchitis/COPD, Emphysema, Chronic Liver Disease,     Colon Trouble, Colitis, Diverticulitis, Congestive Heart Failu, Deafness or     Ringing Ears, Convulsions, Depression, Anxiety, Bipolar Disorder, Diabetes,     Epilepsy, Seizures, Forgetfullness, Glaucoma, Gall Stones, Gout, Head Injury,     Heart Attack, Heart Murmur, Hepatitis, Hiatal Hernia, High Cholesterol, HIV (Do     not ask - volu, Jaundice, Kidney or Bladder Disease, Kidney Stones, Migrane     Headaches, Mitral Valve Prolapse, Night sweats, Phlebitis, Psychiatric Care,     Reflux Disease, Rheumatic Fever, Sexually Transmitted Dis, Sinus Trouble, Skin      Disease/Psoriais/Ecz, Stroke, Thyroid Problem, Tuberculosis or Pos TB Te,     Miscellaneous Medical/oth            PREVENTION      Hx Influenza Vaccination:  No      Influenza Vaccine Declined:  Yes      2 or More Falls Past Year?:  No      Fall Past Year with Injury?:  No      Hx Pneumococcal Vaccination:  No      Encouraged to follow-up with:  PCP regarding preventative exams.      Chart initiated by      Opal Freeman ma            ALLERGIES/MEDICATIONS      Allergies:        Coded Allergies:             *No Known Allergies (Verified  Allergy, Mild, 10/3/18)      Medications    Last Reconciled on 10/3/18 15:52 by NICOLASA HATCH-Albuterol Sulf (Albuterol Sulfate) 5 Mg/1 Ml Solution      2.5 MG INH RTTID, #2 BOTTLE 0 Refills         Prov: Arianne Barrios PA-C         10/3/18       Mometasone/Formoterol (Dulera 200 Mcg/5 Mcg) 13 Gm Hfa.aer.ad      2 PUFFS INH RTBID, #1 MDI 0 Refills         Prov: Arianne Barrios PA-C         10/3/18       Jaycob-Fluticasone (Fluticasone 50 mcg) 16 Gm Spray.susp      1 PUFFS NARE EACH QDAY, #1 BOTTLE 4 Refills         Prov: Arianne Barrios PA-C         10/3/18       predniSONE* (predniSONE*) 10 Mg Tablet      10 MG PO ASDIR, #48 TAB         Prov: Arianne Barrios PA-C         10/3/18       MDI-Albuterol (Ventolin HFA*) 8 Gm Hfa.aer.ad      2 PUFFS INH Q4-6H PRN for DYSPNEA, #1 INH 6 Refills         Prov: Arianne Barrios PA-C         10/3/18       predniSONE* (predniSONE*) 20 Mg Tablet      40 MG PO QDAY, #10 TAB 2 Refills         Prov: Arianne Barrios PA-C         10/3/18       Tiotropium Bromide (Spiriva Respimat 1.25 mcg/puff) 4 Gm Mist.inhal      2 PUFFS INH RTQDAY, #1 INH 9 Refills         Prov: Gonzalo Harris         7/23/18       MDI-Albuterol (Ventolin HFA*) 18 Gm Hfa.aer.ad      2 PUFFS INH Q4H PRN for SHORTNESS OF BREATH, #1 INH 0 Refills         Reported         7/23/18       Lisinopril* (Lisinopril*) 20 Mg Tablet      20 MG PO QDAY, #30 TAB 0 Refills          Reported         7/23/18       Hydrochlorothiazide (Hydrochlorothiazide*) 25 Mg Tablet      25 MG PO QDAY, #30 TAB 0 Refills         Reported         7/23/18       Cetirizine HCl (Cetirizine HCl*) 10 Mg Tablet      10 MG PO QDAY, #30 TAB 0 Refills         Reported         7/23/18       Lovastatin (Lovastatin) 20 Mg Tablet      20 MG PO HS for 30 Days, #30 TAB         Reported         7/23/18       Omeprazole (Omeprazole*) 20 Mg Tablet.dr      20 MG PO QDAY, #30 CAP 0 Refills         Reported         7/23/18       Montelukast Sodium (Singulair*) 10 Mg Tab      10 MG PO QDAY, #30 TAB 3 Refills         Prov: PRITI HERZOG cfr         8/27/07      Current Medications      Current Medications Reviewed 10/3/18            EXAM      CONSTITUTIONAL: Pleasant  normal conversant.      EYES : Pink conjunctive, no ptosis, PERRL.      ENMT : Nose and ears appear normal, normal dentition, mild posterior pharyngeal     wall erythema. Mallampati classification      Neck: Nontender, no masses, no thyromegaly, no nodules.      Resp : Lungs have significant wheezing appreciated throughout and mildly     decreased breath sounds throughout. No rhonchi or crackles appreciated. Normal     work of breathing noted.       CVS   : No carotid bruits, S1, S2 nl, RRR, no murmur, rubs or gallop, no     peripheral edema      Chest wall: Normal rise with inspiration, nontender on palpation      GI     : Abdomen soft, with no masses, no hepatosplenomegaly, no hernias, BS+      MSK   : Normal gait and station, no digital cyanosis or clubbing      Skin : No rashes, ulcerations or lesions, normal turgor and temperature      Neuro: CN II - XII intact, no sensory deficits, DTRs intact and symmetrical, no     motor weakness      Psych: Appropriate affect, A   Vitals      Vitals:             Height 5 ft 8 in / 172.72 cm           Weight 143 lbs 4 oz / 64.163326 kg           BSA 1.77 m2           BMI 21.8 kg/m2           Temperature 98.02 F / 36.68 C - Oral            Pulse 94           Respirations 16           Blood Pressure 142/89 Sitting, Right Arm           Pulse Oximetry 97%, room air            REVIEW      Results Reviewed      P[facility]S Results Reviewed?:  Yes Prev Lab Results, Yes Prev Radiology Results, Yes     Previous [facility] Records      Lab Results      I have personally reviewed previous lab work, imaging, and provider notes     including most recent PFT, 6-minute walk, and lab work.            Assessment      Asthma - J45.909            Elevated IgE level - R76.8            SOB (shortness of breath) - R06.02            Seasonal allergies - J30.2            Allergic rhinitis - J30.9            Notes      New Medications      * predniSONE* 10 MG TABLET: 10 MG PO ASDIR #48         Instructions: 60mg x 3days, 40mg x 3days, 30mg x 3days, 20mg x 3days, 10mg x        3days         Dx: Asthma - J45.909      * Jaycob-Fluticasone (Fluticasone 50 mcg) 16 GM SPRAY.SUSP: 1 PUFFS NARE EACH QDAY       #1         Dx: Asthma - J45.909      * NEB-Albuterol Sulf (Albuterol) 2.5 MG/3 ML VIAL.NEB: 2.5 MG INH Q4H PRN S      HORTNESS OF BREATH #120         Instructions: DIAGNOSIS CODE REQUIRED PRIOR TO PRESCRIBING.      Renewed Medications      * predniSONE* 20 MG TABLET: 40 MG PO QDAY #10      * MDI-Albuterol (Ventolin HFA*) 8 GM HFA.AER.AD: 2 PUFFS INH Q4-6H PRN DYSPNEA       #1      * Mometasone/Formoterol (Dulera 200 Mcg/5 Mcg) 13 GM HFA.AER.AD: 2 PUFFS INH       RTBID #1      Discontinued Medications      * Fluticasone/Vilanterol 200-25 Mcg Inh (Breo Ellipta 200-25 Mcg Inh) 1 EACH       BLST.W.DEV: 1 PUFF INH QDAY 30 Days #1      * NEB-Albuterol Sulf (Albuterol Sulfate) 5 MG/1 ML SOLUTION: 2.5 MG INH RTTID #2         Instructions: DIAGNOSIS CODE REQUIRED PRIOR TO PRESCRIBING.      New Diagnostics      * Immunoglobulin  E (I, 3 Months         Dx: Asthma - J45.909      New Office Procedures      * Solu-Medrol 125 MG, As Soon As Possible         METHYLPRED SOD SUCC (Solu-Medrol) 125 MG  VIAL: 125 MILLIGRAM INTRAMUSC Qty 1        VIAL         Dx: Asthma - J45.909      ASSESSMENT:      1. Severe persistent asthma with acute exacerbation.      2. Possible asthma-COPD overlap, given recent PFTs.      3. Allergic rhinitis.      4. Seasonal allergies.      5. Elevated IgE.      6. GERD.            PLAN:       1. At this time, I will treat the patient €™s asthma exacerbation with a tapered     course of prednisone and will give him an IM shot of Solu-Medrol 125 mg in the     office today.       2. We have talked about his inhaler regimen and he prefers to stay on the     Dulera. I will have him continue on Dulera 200 with 2 puffs twice daily along     with Spiriva Respimat 1.25.       3. I performed fractional exhaled nitric oxide testing for the patient today     resulting in a level of 118. 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 His FeNO is likely so much higher today at 118 compared to     39 at his last visit because he was confused about what to take and has not been    taking an inhaled steroid now for a couple of months.      4. I have given him a prednisone burst to take as needed. He tells me that his     pharmacy never had that, despite that being sent previously. I have encouraged     him to continue using albuterol nebs p.r.n. q.4-6 hours until this exacerbation     clears up.       5. He appears to have seasonal allergies that are not well controlled and     allergic rhinitis that is not well controlled, so in addition to his Singulair     and cetirizine, I will add Flonase nasal spray.       6. I will also have him repeat an IgE level in 3 months since this was     significantly elevated at 214 when it was checked on 09/25/2018.       7. I will have him followup in 2-3 months with Dr. Harris or sooner if needed.            Patient Education      Patient Education Provided:  Acute Asthma, How to use an Inhaler, How to use a     Nebulizer      Time Spent:  > 50%  /[facility]                 Disclaimer: Converted document may not contain table formatting or lab diagrams. Please see [employer] for the authenticated document.

## 2021-05-28 NOTE — PROGRESS NOTES
Patient: CARRI ANTOINE     Acct: [id number]     Report: #[id number]  UNIT #: [id number]     : 1965    Encounter Date:2018  PRIMARY CARE: ANABELLA BOYD [facility]  ***Signed***  --------------------------------------------------------------------------------------------------------------------  Chief Complaint      Encounter Date      2018            Primary Care Provider      ANABELLA BOYD [facility]            Referring Provider      ANABELLA BOYD [facility]            Patient Complaint      Patient is complaining of      new patient/asthma            VITALS      Height 5 ft 2 in / 157.48 cm      Weight 145 lbs 9 oz / 66.734790 kg      BSA 1.72 m2      BMI 26.6 kg/m2      Temperature 98.2 F / 36.78 C - Oral      Pulse 72      Respirations 16      Blood Pressure 132/86 Sitting, Right Arm      Pulse Oximetry 97%, roommair      Exhaled Nitrous Oxide Testin            HPI      The patient is a 53 year old male with history of recurrent bronchitis and     asthma flare over the last 6 months. The patient is referred to us by Anabella Boyd for possible severe persistent asthma. The patient has had a diagnosis     of asthma for 10 years. He did not have any history of asthma when he was a     child. Over the last 6 months he has needed antibiotics and steroids at least     every month. He has been on Dulera two puffs twice daily and albuterol as     needed which he uses 1-2 times a day. He has been treated with a Prednisone and     antibiotics every month and he has been admitted to [facility] in     March and 2018. He takes Singulair daily. He does not smoke anymore but     he has some secondhand smoke exposure from his room mates. He works as a      at a hotel. He says that his breathing problems are worse in     the winter time. He has some postnasal drip, no nausea and vomiting. He used to     smoke but quit smoking in  and prior to that he  smoked half to 1 pack a     day. He had lung function tests at [facility] more than 5 years ago.     His FENO today was 39.            ROS      Constitutional:  Complains of: Fatigue, Denies: Fever, Weight gain, Weight loss    , Chills, Insomnia, Other      Respiratory/Breathing:  Complains of: Shortness of air, Wheezing, Cough, Denies    : Hemoptysis, Pleuritic pain, Other      Endocrine:  Denies: Polydipsia, Polyuria, Heat/cold intolerance, Diabetes, Other      Eyes:  Denies: Blurred vision, Vision Changes, Other      Ears, nose, mouth, throat:  Denies: Mouth lesions, Thrush, Throat pain,     Hoarseness, Allergies/Hay Fever, Post Nasal Drip, Headaches, Recent Head Injury    , Nose Bleeding, Neck Stiffness, Thyroid Mass, Hearing Loss, Ear Fullness, Dry     Mouth, Nasal or Sinus Pain, Dry Lips, Nasal discharge, Nasal congestion, Other      Cardiovascular:  Denies: Palpitations, Syncope, Claudication, Chest Pain, Wake     up Gasping for air, Leg Swelling, Irregular Heart Rate, Cyanosis, Dyspnea on     Exertion, Other      Gastrointestinal:  Denies: Nausea, Constipation, Diarrhea, Abdominal pain,     Vomiting, Difficulty Swallowing, Reflux/Heartburn, Dysphagia, Jaundice, Bloating    , Melena, Bloody stools, Other      Genitourinary:  Denies: Urinary frequency, Incontinence, Hematuria, Urgency,     Nocturia, Dysuria, Testicular problems, Other      Musculoskeletal:  Denies: Joint Pain, Joint Stiffness, Joint Swelling, Myalgias    , Other      Hematologic/lymphatic:  DENIES: Lymphadenopathy, Bruising, Bleeding tendencies,     Other      Neurological:  Denies: Headache, Numbness, Weakness, Seizures, Other      Psychiatric:  Denies: Anxiety, Appropriate Effect, Depression, Other      Sleep:  No: Excessive daytime sleep, Morning Headache?, Snoring, Insomnia?,     Stop breathing at sleep?, Other      Integumentary:  Denies: Rash, Dry skin, Skin Warm to Touch, Other      Immunologic/Allergic:  Denies: Latex allergy,  Seasonal allergies, Asthma,     Urticaria, Eczema, Other      Immunization status:  No: Up to date            FAMILY/SOCIAL/MEDICAL HX      Surgical History:  Yes: Abdominal Surgery (HERNIA SURG), Head Surgery (HEAD     INJURY  SUTURED), No: AAA Repair, Angioplasty, Appendectomy, Back Surgery,     Bladder Surgery, Bowel Surgery, Breast Surgery, CABG, Carotid Stenosis,     Cholecystectomy, Ear Surgery, Eye Surgery, Hernia Surgery, Kidney Surgery, Nose     Surgery, Oral Surgery, Orthopedic Surgery, Prostatectomy, Rectal Surgery,     Spinal Surgery, Testicular Surgery, Throat Surgery, Valve Replacement, Vascular     Surgery, Other Surgeries      Heart - Family Hx:  Mother, Father      Cancer/Type - Family Hx:  Father      Is Father Still Living?:  No      Is Mother Still Living?:  No       Family History:  Yes      Social History:  Tobacco Use, No Alcohol Use, No Recreational Drug use      Smoking status:  Former smoker (1 ppd for 15 years quit in 1989 )      Anticoagulation Therapy:  No      Antibiotic Prophylaxis:  No      Medical History:  Yes: Asthma, Hemorrhoids/Rectal Prob (REFLUX), High Blood     Pressure, Shortness Of Breath, No: Alcoholism, Allergies, Anemia, Arthritis,     Blood Disease, Broken Bones, Cataracts, Chemical Dependency, Chemotherapy/Cancer    , Chronic Bronchitis/COPD, Emphysema, Chronic Liver Disease, Colon Trouble,     Colitis, Diverticulitis, Congestive Heart Failu, Deafness or Ringing Ears,     Convulsions, Depression, Anxiety, Bipolar Disorder, Diabetes, Epilepsy, Seizures    , Forgetfullness, Glaucoma, Gall Stones, Gout, Head Injury, Heart Attack, Heart     Murmur, Hepatitis, Hiatal Hernia, High Cholesterol, HIV (Do not ask - volu,     Jaundice, Kidney or Bladder Disease, Kidney Stones, Migrane Headaches, Mitral     Valve Prolapse, Night sweats, Phlebitis, Psychiatric Care, Reflux Disease,     Rheumatic Fever, Sexually Transmitted Dis, Sinus Trouble, Skin Disease/Psoriais/    Ecz, Stroke,  Thyroid Problem, Tuberculosis or Pos TB Te, Miscellaneous Medical/    oth      Psychiatric History      none            PREVENTION      Hx Influenza Vaccination:  No      Influenza Vaccine Declined:  Yes      2 or More Falls Past Year?:  No      Fall Past Year with Injury?:  No      Hx Pneumococcal Vaccination:  No      Encouraged to follow-up with:  PCP regarding preventative exams.      Chart initiated by      brittney gardner ma            ALLERGIES/MEDICATIONS      Allergies:        Coded Allergies:             *No Known Allergies (Verified  Allergy, Mild, 7/23/18)      Medications    Last Reconciled on 7/23/18 12:16 by GONZALO HARRIS MD      MDI-Albuterol (Ventolin HFA*) 8 Gm Hfa.aer.ad      2 PUFFS INH Q4-6H Y for DYSPNEA, #1 INH 6 Refills         Prov: Gonzalo Harris         7/23/18       predniSONE* (predniSONE*) 20 Mg Tablet      40 MG PO QDAY, #10 TAB 2 Refills         Prov: Gonzalo Harris         7/23/18       Tiotropium Bromide (Spiriva Respimat 1.25 mcg/puff) 4 Gm Mist.inhal      2 PUFFS INH RTQDAY, #1 INH 9 Refills         Prov: Gonzalo Harris         7/23/18       Budesonide/Formoterol Fumarate (Symbicort 160/4.5 Mcg) 10.2 Gm Inh      2 PUFF INH BID, #1 INH 9 Refills         Prov: Gonzalo Harris         7/23/18       NEB-Albuterol Sulf (Albuterol Sulfate) 5 Mg/1 Ml Solution      2.5 MG INH RTTID, #2 BOTTLE 0 Refills         Reported         7/23/18       Mometasone/Formoterol (Dulera 200 Mcg/5 Mcg) 13 Gm Hfa.aer.ad      2 PUFFS INH RTBID, #1 MDI 0 Refills         Reported         7/23/18       MDI-Albuterol (Ventolin HFA*) 18 Gm Hfa.aer.ad      2 PUFFS INH Q4H Y for SHORTNESS OF BREATH, #1 INH 0 Refills         Reported         7/23/18       Lisinopril* (Lisinopril*) 20 Mg Tablet      20 MG PO QDAY, #30 TAB 0 Refills         Reported         7/23/18       Hydrochlorothiazide (Hydrochlorothiazide*) 25 Mg Tablet      25 MG PO QDAY, #30 TAB 0 Refills         Reported         7/23/18       Cetirizine HCl (Cetirizine  HCl*) 10 Mg Tablet      10 MG PO QDAY, #30 TAB 0 Refills         Reported         7/23/18       Lovastatin (Lovastatin) 20 Mg Tablet      20 MG PO HS for 30 Days, #30 TAB         Reported         7/23/18       Omeprazole (Omeprazole*) 20 Mg Tablet.dr      20 MG PO QDAY, #30 CAP 0 Refills         Reported         7/23/18       Montelukast Sodium (Singulair*) 10 Mg Tab      10 MG PO QDAY, #30 TAB 3 Refills         Prov: PRITI HERZOG cfr         8/27/07      Current Medications      Current Medications Reviewed 7/23/18            EXAM      CONSTITUTIONAL: Pleasant male in no acute distress,  normal conversant.       EYES : Pink conjunctive, no ptosis, PERRL.       ENMT : Nose and ears appear normal, normal dentition, mild posterior pharyngeal     wall erythema. Mallampati classification II, no sinus tenderness.       Neck: Nontender, no masses, no thyromegaly, no nodules.      Resp : Bilateral diminished  breath sounds, resonant to percussion bilaterally,     some expiratory wheezing, no crackles or rhonchi.      CVS  : No carotid bruits, s1s2 nl, RRR, no murmur, rubs or gallop, no     peripheral edema       Chest wall: Normal rise with inspiration, nontender on palpation      GI   : Abdomen soft, with no masses, no hepatosplenomegaly, no hernias, BS+      MSK  : Normal gait and station, no digital cyanosis or clubbing       Skin : No rashes, ulcerations or lesions, normal turgor and temperature      Neuro: CN II - XII intact, no sensory deficits, DTRs intact and symmetrical, no     motor weakness      Psych: Appropriate affect, A   Vtials      Vitals:             Height 5 ft 2 in / 157.48 cm           Weight 145 lbs 9 oz / 66.465283 kg           BSA 1.72 m2           BMI 26.6 kg/m2           Temperature 98.2 F / 36.78 C - Oral           Pulse 72           Respirations 16           Blood Pressure 132/86 Sitting, Right Arm           Pulse Oximetry 97%, roommair            REVIEW      Results Reviewed      PCCS Results  Reviewed?:  Yes Prev Lab Results, Yes Prev Radiology Results, Yes     Previous Mecial Records      Lab Results      The patient's urgent care office visit notes were reviewed.      Radiographic Results               [facility]                PACS RADIOLOGY REPORT            Patient: CARRI ANTOINE   Acct: #[id number]   Report: #[id number]            UNIT #: [id number]    DOS: 18 1923      INSURANCE:BLUE MEDICAID   ORDER #:[id number]      LOCATION:ER     : 1965            PROVIDERS      ADMITTING:     ATTENDING:       FAMILY:     ORDERING:  LAURENT KILGORE         OTHER:    DICTATING:  Lloyd Freeman MD            REQ #:[id number]   EXAM:CXR1 - CHEST 1 View AP PA      REASON FOR EXAM:  Chest Pain      REASON FOR VISIT:  CP/CHRISTIANNE SHOULDER COMPLAINT/NECK COMPLAINT            *******Signed******         PROCEDURE:   CHEST AP/PA SINGLE VIEW             COMPARISON:   [facility], CR, CHEST PA/AP   21.             INDICATIONS:   chest pains, short of air, today             FINDINGS:         The heart is normal in size. The lungs are well-expanded and free of     infiltrates.             Bony structures appear intact.             CONCLUSION:   No active disease is seen.              LLOYD FREEMAN MD             Electronically Signed and Approved By: LLOYD FREEMAN MD on 2018 at 19:48                            Until signed, this is an unconfirmed preliminary report that may contain      errors and is subject to change.                                              COUST:      D:18            Assessment      Asthma       Severe persistent asthma without complication - J45.50       Asthma severity: severe       Asthma persistence: persistent       Asthma complication type: uncomplicated            Notes      New Medications      * Omeprazole (Omeprazole*) 20 MG TABLET.DR: 20 MG PO QDAY #30      * Lovastatin 20 MG TABLET: 20 MG  PO HS 30 Days #30      * Cetirizine HCl (Cetirizine HCl*) 10 MG TABLET: 10 MG PO QDAY #30      * HYDROCHLOROTHIAZIDE (Hydrochlorothiazide*) 25 MG TABLET: 25 MG PO QDAY #30      * Lisinopril* 20 MG TABLET: 20 MG PO QDAY #30      * MDI-Albuterol (Ventolin HFA*) 18 GM HFA.AER.AD: 2 PUFFS INH Q4H PRN SHORTNESS     OF BREATH #1      * Mometasone/Formoterol (Dulera 200 Mcg/5 Mcg) 13 GM HFA.AER.AD: 2 PUFFS INH     RTBID #1      * NEB-Albuterol Sulf (Albuterol Sulfate) 5 MG/1 ML SOLUTION: 2.5 MG INH RTTID #2       Instructions: DIAGNOSIS CODE REQUIRED PRIOR TO PRESCRIBING.      * Budesonide/Formoterol Fumarate (Symbicort 160/4.5 Mcg) 10.2 GM INH: 2 PUFF     INH BID #1      * TIOTROPIUM BROMIDE (Spiriva Respimat 1.25 mcg/puff) 4 GM MIST.INHAL: 2 PUFFS     INH RTQDAY #1      * predniSONE* 20 MG TABLET: 40 MG PO QDAY #10      * MDI-Albuterol (Ventolin HFA*) 8 GM HFA.AER.AD: 2 PUFFS INH Q4-6H PRN DYSPNEA #    1      Discontinued Medications      * Fluticasone/Salmeterol (Advair 250/50 Diskus*) 28 PUFF/INH INH: 1 PUFF INH     BID #1      * Gabapentin (Neurontin) 300 MG CAPSULE: 300 MG PO BID 30 Days      * Trimethoprim/Sulfamethoxazole (Bactrim Ds) 1 TAB TAB: 2 TAB PO BID #40      New Diagnostics      * PFT-Comp, PrePost,DLCO,BodyBox, Week       Dx: Asthma - J45.909      * 6 Min Walk With Pulse Ox, Routine       Dx: Asthma - J45.909      * Immunoglobulin  E (I, Week       Dx: Asthma - J45.909      PLAN:      The patient is a 53 year old male with apparent diagnosis of asthma and has     asthma flares.             1. Severe persistent asthma. I will check pulmonary function tests and six     minute walk test. I will check serum IgE level. I will switch his Dulera to     Symbicort two puffs twice daily and albuterol as needed. I will start him on     low dose Spiriva once daily. Asthma exacerbation action plan was discussed in     detail. I have given him Prednisone 5 day burst with 2 refills to use as     needed. 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 I will start him on Cetirizine once daily.       2. Gastrointestinal esophageal reflux disease. Continue with Omeprazole.       3. I will follow up with him in 6-8 weeks, earlier if needed.            Patient Education      Education resources provided:  Yes      Patient Education Provided:  Acute Asthma, Acute Bronchitis                 Disclaimer: Converted document may not contain table formatting or lab diagrams. Please see [employer] for the authenticated document.

## 2021-05-28 NOTE — PROGRESS NOTES
Patient: CARRI ANTOINE     Acct: [id number]     Report: #[id number]  UNIT #: [id number]     : 1965    Encounter Date:2019  PRIMARY CARE: [facility]  ***Signed***  --------------------------------------------------------------------------------------------------------------------  Chief Complaint      Encounter Date      Mar 1, 2019            Primary Care Provider      [facility]            Referring Provider      [facility]            Patient Complaint      Patient is complaining of      soa follow up            VITALS      Height 5 ft 8 in / 172.72 cm      Weight 144 lbs 0 oz / 65.845846 kg      BSA 1.78 m2      BMI 21.9 kg/m2      Temperature 98.6 F / 37 C - Oral      Pulse 91      Respirations 18      Blood Pressure 118/70 Sitting, Right Arm      Pulse Oximetry 97%, room air      Initial Exhaled Nitrous Oxide      Exhaled Nitrous Oxide Results:  39            Repeated Exhaled Nitrous Oxide      Date:  Mar 1, 2019      Repeated Nitrous Oxide Results:  13            HPI      The patient is a 53 year old male with severe persistent asthma, COPD overlap,     allergic rhinitis, seasonal allergies and elevated IgG, peripheral eosinophilia,    gastroesophageal reflux disease.  He is here for follow up.  At his last office     visit he was seen by NICOLASA Marcelino. At that time, he was put on Dulera,     Spiriva and albuterol.  He was given several rounds of prednisone.  He takes     prednisone burst every month or so.  He has been on Singulair, Zyrtec and     Flonase.  He ran out of Flonase and is currently not using it. He has nocturnal     awakenings every night and needs to wake up early in the morning to take a     rescue inhaler.  He takes a regular rescue inhaler on an average of 1-3 times a     day.  He has no fever, chills, nausea or vomiting. He has chest tightness and     wheezing at times. No fever or chills.  His Spiriva dose was increased  from 1.25    mcg to 2.5 mcg last office visit and it has helped some.            ROS      Constitutional:  Complains of: Fatigue; Denies: Fever, Weight gain, Weight loss,    Chills, Insomnia, Other      Respiratory/Breathing:  Complains of: Shortness of air; Denies: Wheezing, Cough,    Hemoptysis, Pleuritic pain, Other      Endocrine:  Denies: Polydipsia, Polyuria, Heat/cold intolerance, Diabetes, Other      Eyes:  Denies: Blurred vision, Vision Changes, Other      Ears, nose, mouth, throat:  Denies: Mouth lesions, Thrush, Throat pain,     Hoarseness, Allergies/Hay Fever, Post Nasal Drip, Headaches, Recent Head Injury,    Nose Bleeding, Neck Stiffness, Thyroid Mass, Hearing Loss, Ear Fullness, Dry     Mouth, Nasal or Sinus Pain, Dry Lips, Nasal discharge, Nasal congestion, Other      Cardiovascular:  Denies: Palpitations, Syncope, Claudication, Chest Pain, Wake     up Gasping for air, Leg Swelling, Irregular Heart Rate, Cyanosis, Dyspnea on     Exertion, Other      Gastrointestinal:  Denies: Nausea, Constipation, Diarrhea, Abdominal pain,     Vomiting, Difficulty Swallowing, Reflux/Heartburn, Dysphagia, Jaundice,     Bloating, Melena, Bloody stools, Other      Genitourinary:  Denies: Urinary frequency, Incontinence, Hematuria, Urgency, N    octuria, Dysuria, Testicular problems, Other      Musculoskeletal:  Denies: Joint Pain, Joint Stiffness, Joint Swelling, Myalgias,    Other      Hematologic/lymphatic:  DENIES: Lymphadenopathy, Bruising, Bleeding tendencies,     Other      Neurological:  Denies: Headache, Numbness, Weakness, Seizures, Other      Psychiatric:  Denies: Anxiety, Appropriate Effect, Depression, Other      Sleep:  No: Excessive daytime sleep, Morning Headache?, Snoring, Insomnia?, Stop    breathing at sleep?, Other      Integumentary:  Denies: Rash, Dry skin, Skin Warm to Touch, Other      Immunologic/Allergic:  Denies: Latex allergy, Seasonal allergies, Asthma,     Urticaria, Eczema, Other       Immunization status:  No: Up to date            FAMILY/SOCIAL/MEDICAL HX      Surgical History:  Yes: Abdominal Surgery (HERNIA SURG), Head Surgery (HEAD     INJURY  SUTURED); No: AAA Repair, Angioplasty, Appendectomy, Back Surgery,     Bladder Surgery, Bowel Surgery, Breast Surgery, CABG, Carotid Stenosis,     Cholecystectomy, Ear Surgery, Eye Surgery, Hernia Surgery, Kidney Surgery, Nose     Surgery, Oral Surgery, Orthopedic Surgery, Prostatectomy, Rectal Surgery, Spinal    Surgery, Testicular Surgery, Throat Surgery, Valve Replacement, Vascular     Surgery, Other Surgeries      Heart - Family Hx:  Mother, Father      Cancer/Type - Family Hx:  Father      Is Father Still Living?:  No      Is Mother Still Living?:  No       Family History:  Yes      Social History:  Tobacco Use; No Alcohol Use, No Recreational Drug use      Smoking status:  Former smoker (1 ppd for 15 years quit in 1989 )      Anticoagulation Therapy:  No      Antibiotic Prophylaxis:  No      Medical History:  Yes: Asthma, Hemorrhoids/Rectal Prob (REFLUX), High Blood     Pressure, Shortness Of Breath; No: Alcoholism, Allergies, Anemia, Arthritis,     Blood Disease, Broken Bones, Cataracts, Chemical Dependency,     Chemotherapy/Cancer, Chronic Bronchitis/COPD, Emphysema, Chronic Liver Disease,     Colon Trouble, Colitis, Diverticulitis, Congestive Heart Failu, Deafness or     Ringing Ears, Convulsions, Depression, Anxiety, Bipolar Disorder, Diabetes,     Epilepsy, Seizures, Forgetfullness, Glaucoma, Gall Stones, Gout, Head Injury,     Heart Attack, Heart Murmur, Hepatitis, Hiatal Hernia, High Cholesterol, HIV (Do     not ask - volu, Jaundice, Kidney or Bladder Disease, Kidney Stones, Migrane     Headaches, Mitral Valve Prolapse, Night sweats, Phlebitis, Psychiatric Care,     Reflux Disease, Rheumatic Fever, Sexually Transmitted Dis, Sinus Trouble, Skin     Disease/Psoriais/Ecz, Stroke, Thyroid Problem, Tuberculosis or Pos TB Te,     Miscellaneous  Medical/oth      Psychiatric History      none            PREVENTION      Hx Influenza Vaccination:  No      Influenza Vaccine Declined:  Yes      2 or More Falls Past Year?:  No      Fall Past Year with Injury?:  No      Hx Pneumococcal Vaccination:  No      Encouraged to follow-up with:  PCP regarding preventative exams.      Chart initiated by      brittney gardner ma            ALLERGIES/MEDICATIONS      Allergies:        Coded Allergies:             *No Known Allergies (Verified  Allergy, Mild, 3/1/19)      Medications    Last Reconciled on 3/1/19 11:51 by BELGICA HARRIS MD      predniSONE* (predniSONE*) 20 Mg Tablet      40 MG PO QDAY, #10 TAB 4 Refills         Prov: Belgica Harris         3/1/19       Jaycob-Fluticasone (Fluticasone 50 mcg) 16 Gm Spray.susp      2 PUFFS NARE EACH QDAY, #1 BOTTLE 9 Refills         Prov: Belgica Harris         3/1/19       Tiotropium Bromide (Spiriva Respimat 2.5 mcg/Puff) 4 Gm Mist.inhal      2 PUFFS INH QDAY, #1 MDI 6 Refills         Prov: Arianne Barrios PA-C         12/3/18       NEB-Albuterol Sulf (Albuterol) 2.5 Mg/3 Ml Vial.neb      2.5 MG INH Q4H PRN for SHORTNESS OF BREATH, #120 NEB 1 Refill         Prov: Arianne Barrios PA-C         10/4/18       Mometasone/Formoterol (Dulera 200 Mcg/5 Mcg) 13 Gm Hfa.aer.ad      2 PUFFS INH RTBID, #1 MDI 0 Refills         Prov: Arianne Barrios PA-C         10/3/18       Jaycob-Fluticasone (Fluticasone 50 mcg) 16 Gm Spray.susp      1 PUFFS NARE EACH QDAY, #1 BOTTLE 4 Refills         Prov: Arianne Barrios PA-C         10/3/18       MDI-Albuterol (Ventolin HFA) 8 Gm Hfa.aer.ad      2 PUFFS INH Q4-6H PRN for DYSPNEA, #1 INH 6 Refills         Prov: Arianne Barrios PA-C         10/3/18       MDI-Albuterol (Ventolin HFA) 18 Gm Hfa.aer.ad      2 PUFFS INH Q4H PRN for SHORTNESS OF BREATH, #1 INH 0 Refills         Reported         7/23/18       Lisinopril* (Lisinopril*) 20 Mg Tablet      20 MG PO QDAY, #30 TAB 0 Refills         Reported         7/23/18        Hydrochlorothiazide (Hydrochlorothiazide*) 25 Mg Tablet      25 MG PO QDAY, #30 TAB 0 Refills         Reported         7/23/18       Cetirizine Hcl (CETIRIZINE HCL) 10 Mg Tablet      10 MG PO QDAY, #30 TAB 0 Refills         Reported         7/23/18       Lovastatin (Lovastatin) 20 Mg Tablet      20 MG PO HS for 30 Days, #30 TAB         Reported         7/23/18       Omeprazole (Omeprazole*) 20 Mg Tablet.dr      20 MG PO QDAY, #30 CAP 0 Refills         Reported         7/23/18       Montelukast Sodium (Singulair*) 10 Mg Tab      10 MG PO QDAY, #30 TAB 3 Refills         Prov: PRITI HERZOG cfr         8/27/07      Current Medications      Current Medications Reviewed 3/1/19            EXAM      CONSTITUTIONAL: Pleasant male in no acute distress,  normal conversant.       EYES : Pink conjunctive, no ptosis, PERRL.       ENMT : Nose and ears appear normal, normal dentition, mild posterior pharyngeal     wall erythema. Mallampati classification II, no sinus tenderness.       Neck: Nontender, no masses, no thyromegaly, no nodules.      Resp : Bilateral diminished  breath sounds, resonant to percussion bilaterally,     some expiratory wheezing, no crackles or rhonchi.      CVS  : No carotid bruits, s1s2 nl, RRR, no murmur, rubs or gallop, no peripheral    edema       Chest wall: Normal rise with inspiration, nontender on palpation      GI   : Abdomen soft, with no masses, no hepatosplenomegaly, no hernias, BS+      MSK  : Normal gait and station, no digital cyanosis or clubbing       Skin : No rashes, ulcerations or lesions, normal turgor and temperature      Neuro: CN II - XII intact, no sensory deficits, DTRs intact and symmetrical, no     motor weakness      Psych: Appropriate affect, A   Vtials      Vitals:             Height 5 ft 8 in / 172.72 cm           Weight 144 lbs 0 oz / 65.530036 kg           BSA 1.78 m2           BMI 21.9 kg/m2           Temperature 98.6 F / 37 C - Oral           Pulse 91            Respirations 18           Blood Pressure 118/70 Sitting, Right Arm           Pulse Oximetry 97%, room air            REVIEW      Results Reviewed      P[facility]S Results Reviewed?:  Yes Prev Lab Results, Yes Prev Radiology Results, Yes     Previous [facility] Records      Lab Results      The patient's serum IgE level was high.  The patient has intermittent peripheral    eosinophilia with high eosinophil count.  His last IgE level was 94. Prior to     that it was 214.      Radiographic Results               [facility]                PACS RADIOLOGY REPORT            Patient: CARRI ANTOINE   Acct: #[id number]   Report: #[id number]            UNIT #: [id number]    DOS: 18 1923      INSURANCE:BLUE MEDICAID   ORDER #:[id number]      LOCATION:ER     : 1965            PROVIDERS      ADMITTING:     ATTENDING:       FAMILY:     ORDERING:  LAURENT KILGORE         OTHER:    DICTATING:  Lloyd Freeman MD            REQ #:[id number]   EXAM:CXR1 - CHEST 1 View AP PA      REASON FOR EXAM:  Chest Pain      REASON FOR VISIT:  CP/CHRISTIANNE SHOULDER COMPLAINT/NECK COMPLAINT            *******Signed******         PROCEDURE:   CHEST AP/PA SINGLE VIEW             COMPARISON:   [facility], , CHEST PA/AP   11:21.             INDICATIONS:   chest pains, short of air, today             FINDINGS:         The heart is normal in size. The lungs are well-expanded and free of     infiltrates.             Bony structures appear intact.             CONCLUSION:   No active disease is seen.              LLOYD FREEMAN MD             Electronically Signed and Approved By: LLOYD FREEMAN MD on 2018 at 19:48                           Until signed, this is an unconfirmed preliminary report that may contain      errors and is subject to change.                                              COUST:      D:18 194      PFT Results      [id number]  [id number]                                  [facility]                          [employer]                            [place]  [phone]               __________________________________________________________________________             Patient Name:                   Attending Physician:      Bentley Martínez M.D.             Patient Visit # MR #            Admit Date  Disch Date     Location      [id number]    [id number]      09/25/2018                 [facility] -             Date of Birth      1965      __________________________________________________________________________      0821 - DIAGNOSTIC REPORT             PULMONARY FUNCTION TEST             DATE OF STUDY:      09/25/2018             SPIROMETRY:      Spirometry shows moderate obstructive defect.      FEV1/FVC is 64.      FEV1 is 2.41 L, 67% of predicted.      FVC is 3.75 L, 80% of predicted.             BRONCHODILATOR RESPONSE:      There is a significant response to bronchodilator administration. FEV1      increased from 2.41 L to 3.21 L, 33% change. FVC increased from 3.75 to 4.38      L, 17% change.             LUNG VOLUMES:      Lung volumes show air trapping.      Total lung capacity is 6.95 L, 105% of predicted.      Residual volume is 3.10 L, 153% of predicted.             DIFFUSION:      Diffusion capacity is normal, 90% of predicted.             FLOW VOLUME LOOP:      Flow volume loop is suggestive of obstructive process.             CONCLUSION:      Low FEV1/FVC with low FEV1, air trapping and normal diffusion capacity. It      suggest moderate obstructive airway disease, likely chronic bronchitis      phenotype or obstructive asthma.  Please correlate clinically.             To be electronically signed in [employer]      [id number] BELGICA RAMOS M.D.             NK:ts      D:  10/15/2018 04:51      T:  10/15/2018 14:39      #[id number]             Until signed, this is an  unconfirmed preliminary report that may contain      errors and is subject to change.                   10/15/18 1957  <Electronically signed by Gonzalo Harris MD>            Assessment      Notes      New Medications      * Jaycob-Fluticasone (Fluticasone 50 mcg) 16 GM SPRAY.SUSP: 2 PUFFS NARE EACH QDAY       #1      * predniSONE* 20 MG TABLET: 40 MG PO QDAY #10      PLAN:  The patient is a 53 year old male with severe persistent asthma,     asthma/COPD overlap, recurrent asthma exacerbation, allergic rhinitis, elevated     IgE, peripheral eosinophilia and gastroesophageal reflux disease.        1. Severe persistent asthma with recurrent asthma exacerbation.  Continue with     Dulera 200 mcg twice a day, Spiriva 2.5 mg once daily, albuterol as needed.      Asthma exacerbation action plan was discussed in detail. I have given her asthma    burst to use for five days with four refills. I will send a script for fasenra.     We will try to have fasenra approved for him.  He is willing to use it.      2.  Continue Singulair, Flonase and Zyrtec.      3.  I will follow up with him in 2-3 months, earlier if needed.  Hopefully, he     can have fasenra approved by then.            Patient Education      Education resources provided:  Yes      Patient Education Provided:  Acute Asthma, Acute Bronchitis                 Disclaimer: Converted document may not contain table formatting or lab diagrams. Please see [employer] for the authenticated document.

## 2021-05-28 NOTE — PROGRESS NOTES
Called and notified pt as per Dr Chavez Haider  Pt asked to have the Tetanus vaccine on to her next physical appt in November  Added to appt notes  Patient: CARRI ANTOINE     Acct: [id number]     Report: #[id number]  UNIT #: [id number]     : 1965    Encounter Date:2018  PRIMARY CARE: ANABELLA BOYD [facility]  ***Signed***  --------------------------------------------------------------------------------------------------------------------  Chief Complaint      Encounter Date      Dec 3, 2018            Primary Care Provider      ANABELLA BOYD [facility]            Referring Provider      ANABELLA BOYD [facility]            Patient Complaint      Patient is complaining of      f/u asthma            VITALS      Height 5 ft 8 in / 172.72 cm      Weight 144 lbs 8 oz / 65.246051 kg      BSA 1.78 m2      BMI 22.0 kg/m2      Temperature 98 F / 36.67 C      Pulse 76      Respirations 14      Blood Pressure 131/77 Sitting, Left Arm      Pulse Oximetry 97%, room air      Initial Exhaled Nitrous Oxide      Exhaled Nitrous Oxide Results:  39            HPI      The patient is a very pleasant 53 year old white male who is a patient of Dr. Yeung.  I last saw him two months ago for follow up of his asthma.  He has a     history of severe persistent asthma that had not been well controlled. At his     last visit, I discovered that he was not taking his inhalers as prescribed as he    did not receive the Symbicort that his Dulera was changed to. It also appeared     it may have been changed again to Breo due to insurance coverage and he never     received that either.  Therefore, he was only taking Spiriva 1.25.  He was not     on a LABA ICS. He had been having increased dyspnea, coughing and wheezing and     had required steroids now several times in the past year.  I treated him for an     asthma exacerbation at his last visit with a tapered course of prednisone.  He     preferred to stay on the Dulera, so I started him back on Dulera 200 which he     has been taking and is now taking Spiriva Respimat 1.25 along with that. He is     overall  doing better, but still did have another asthma exacerbation in the past    two months requiring a short burst of prednisone. This was after I had already     treated him two months ago for an asthma exacerbation, so he has actually had     two asthma exacerbations in total just in the past two months.  He is using his     albuterol less frequently than prior as he was previously using it 4-6 times per    day, but he is still using his albuterol rescue inhaler two times per day even     now.  He has had an elevated IgE in the past at 214 and was suppose to have that    rechecked before his visit today, but unfortunately his lab work was scheduled     for a month from now rather than before this visit.  He currently denies any     purulent sputum production, hemoptysis, fever or chills. He feels his breathing     is about at his baseline, but he does have dyspnea with mild to moderate     exertion relieved with rest and has wheezing daily requiring albuterol.              I have reviewed her ROS, medical, surgical and family history and agree with     those as entered in the chart.      Copies To:   Gonzalo Harris ;            ABEL      Constitutional:  Denies: Fatigue, Fever, Weight gain, Weight loss, Chills,     Insomnia, Other      Respiratory/Breathing:  Complains of: Wheezing; Denies: Shortness of air, Cough,    Hemoptysis, Pleuritic pain, Other      Endocrine:  Denies: Polydipsia, Polyuria, Heat/cold intolerance, Diabetes, Other      Eyes:  Denies: Blurred vision, Vision Changes, Other      Ears, nose, mouth, throat:  Denies: Mouth lesions, Thrush, Throat pain,     Hoarseness, Allergies/Hay Fever, Post Nasal Drip, Headaches, Recent Head Injury,    Nose Bleeding, Neck Stiffness, Thyroid Mass, Hearing Loss, Ear Fullness, Dry     Mouth, Nasal or Sinus Pain, Dry Lips, Nasal discharge, Nasal congestion, Other      Cardiovascular:  Denies: Palpitations, Syncope, Claudication, Chest Pain, Wake     up Gasping for air, Leg  Swelling, Irregular Heart Rate, Cyanosis, Dyspnea on     Exertion, Other      Gastrointestinal:  Denies: Nausea, Constipation, Diarrhea, Abdominal pain,     Vomiting, Difficulty Swallowing, Reflux/Heartburn, Dysphagia, Jaundice,     Bloating, Melena, Bloody stools, Other      Genitourinary:  Denies: Urinary frequency, Incontinence, Hematuria, Urgency,     Nocturia, Dysuria, Testicular problems, Other      Musculoskeletal:  Denies: Joint Pain, Joint Stiffness, Joint Swelling, Myalgias,    Other      Hematologic/lymphatic:  DENIES: Lymphadenopathy, Bruising, Bleeding tendencies,     Other      Neurological:  Denies: Headache, Numbness, Weakness, Seizures, Other      Psychiatric:  Denies: Anxiety, Appropriate Effect, Depression, Other      Sleep:  No: Excessive daytime sleep, Morning Headache?, Snoring, Insomnia?, Stop    breathing at sleep?, Other      Integumentary:  Denies: Rash, Dry skin, Skin Warm to Touch, Other      Immunologic/Allergic:  Denies: Latex allergy, Seasonal allergies, Asthma,     Urticaria, Eczema, Other      Immunization status:  No: Up to date            FAMILY/SOCIAL/MEDICAL HX      Surgical History:  Yes: Abdominal Surgery (HERNIA SURG), Head Surgery (HEAD     INJURY  SUTURED); No: AAA Repair, Angioplasty, Appendectomy, Back Surgery,     Bladder Surgery, Bowel Surgery, Breast Surgery, CABG, Carotid Stenosis,     Cholecystectomy, Ear Surgery, Eye Surgery, Hernia Surgery, Kidney Surgery, Nose     Surgery, Oral Surgery, Orthopedic Surgery, Prostatectomy, Rectal Surgery, Spinal    Surgery, Testicular Surgery, Throat Surgery, Valve Replacement, Vascular     Surgery, Other Surgeries      Heart - Family Hx:  Mother, Father      Cancer/Type - Family Hx:  Father      Is Father Still Living?:  No      Is Mother Still Living?:  No       Family History:  Yes      Social History:  Tobacco Use; No Alcohol Use, No Recreational Drug use      Smoking status:  Former smoker (1 ppd for 15 years quit in 1989 )       Anticoagulation Therapy:  No      Antibiotic Prophylaxis:  No      Medical History:  Yes: Asthma, Hemorrhoids/Rectal Prob (REFLUX), High Blood     Pressure, Shortness Of Breath; No: Alcoholism, Allergies, Anemia, Arthritis,     Blood Disease, Broken Bones, Cataracts, Chemical Dependency,     Chemotherapy/Cancer, Chronic Bronchitis/COPD, Emphysema, Chronic Liver Disease,     Colon Trouble, Colitis, Diverticulitis, Congestive Heart Failu, Deafness or     Ringing Ears, Convulsions, Depression, Anxiety, Bipolar Disorder, Diabetes,     Epilepsy, Seizures, Forgetfullness, Glaucoma, Gall Stones, Gout, Head Injury,     Heart Attack, Heart Murmur, Hepatitis, Hiatal Hernia, High Cholesterol, HIV (Do     not ask - volu, Jaundice, Kidney or Bladder Disease, Kidney Stones, Migrane     Headaches, Mitral Valve Prolapse, Night sweats, Phlebitis, Psychiatric Care,     Reflux Disease, Rheumatic Fever, Sexually Transmitted Dis, Sinus Trouble, Skin     Disease/Psoriais/Ecz, Stroke, Thyroid Problem, Tuberculosis or Pos TB Te,     Miscellaneous Medical/oth            PREVENTION      Hx Influenza Vaccination:  No      Influenza Vaccine Declined:  Yes      2 or More Falls Past Year?:  No      Fall Past Year with Injury?:  No      Hx Pneumococcal Vaccination:  No      Encouraged to follow-up with:  PCP regarding preventative exams.      Chart initiated by      Opal Freeman ma            ALLERGIES/MEDICATIONS      Allergies:        Coded Allergies:             *No Known Allergies (Verified  Allergy, Mild, 12/3/18)      Medications    Last Reconciled on 12/3/18 16:05 by NICOLASA HATCH      Tiotropium Bromide (Spiriva Respimat 2.5 mcg/Puff) 4 Gm Mist.inhal      2 PUFFS INH QDAY, #1 MDI 6 Refills         Prov: Arianne Barrios PA-C         12/3/18       NEB-Albuterol Sulf (Albuterol) 2.5 Mg/3 Ml Vial.neb      2.5 MG INH Q4H PRN for SHORTNESS OF BREATH, #120 NEB 1 Refill         Prov: Arianne Barrios PA-C         10/4/18        Mometasone/Formoterol (Dulera 200 Mcg/5 Mcg) 13 Gm Hfa.aer.ad      2 PUFFS INH RTBID, #1 MDI 0 Refills         Prov: Arianne Barrios PA-C         10/3/18       Jaycob-Fluticasone (Fluticasone 50 mcg) 16 Gm Spray.susp      1 PUFFS NARE EACH QDAY, #1 BOTTLE 4 Refills         Prov: Arianne Barrios PA-C         10/3/18       MDI-Albuterol (Ventolin HFA) 8 Gm Hfa.aer.ad      2 PUFFS INH Q4-6H PRN for DYSPNEA, #1 INH 6 Refills         Prov: Arianne Barrios PA-C         10/3/18       MDI-Albuterol (Ventolin HFA) 18 Gm Hfa.aer.ad      2 PUFFS INH Q4H PRN for SHORTNESS OF BREATH, #1 INH 0 Refills         Reported         7/23/18       Lisinopril* (Lisinopril*) 20 Mg Tablet      20 MG PO QDAY, #30 TAB 0 Refills         Reported         7/23/18       Hydrochlorothiazide (Hydrochlorothiazide*) 25 Mg Tablet      25 MG PO QDAY, #30 TAB 0 Refills         Reported         7/23/18       Cetirizine Hcl (CETIRIZINE HCL) 10 Mg Tablet      10 MG PO QDAY, #30 TAB 0 Refills         Reported         7/23/18       Lovastatin (Lovastatin) 20 Mg Tablet      20 MG PO HS for 30 Days, #30 TAB         Reported         7/23/18       Omeprazole (Omeprazole*) 20 Mg Tablet.dr      20 MG PO QDAY, #30 CAP 0 Refills         Reported         7/23/18       Montelukast Sodium (Singulair*) 10 Mg Tab      10 MG PO QDAY, #30 TAB 3 Refills         Prov: MINOPRITI cfr         8/27/07      Current Medications      Current Medications Reviewed 12/3/18            EXAM      CONSTITUTIONAL: Pleasant  normal conversant.       EYES : Pink conjunctive, no ptosis, PERRL.       ENMT : Nose and ears appear normal, normal dentition, mild posterior pharyngeal     wall erythema, no sinus tenderness. Mallampati classification       Neck: Nontender, no masses, no thyromegaly, no nodules.      Resp : Lungs have mildly decreased breath sounds with trace expiratory wheezing     appreciated, improved from prior, no rhonchi or crackles appreciated.  Normal     work of breathing  noted.        CVS  : No carotid bruits, s1s2 nl, RRR, no murmur, rubs or gallop, no peripheral    edema       Chest wall: Normal rise with inspiration, nontender on palpation.      GI   : Abdomen soft, with no masses, no hepatosplenomegaly, no hernias, BS+      MSK  : Normal gait and station, no digital cyanosis or clubbing       Skin : No rashes, ulcerations or lesions, normal turgor and temperature      Neuro: CN II - XII intact, no sensory deficits, DTRs intact and symmetrical, no     motor weakness      Psych: Appropriate affect, A   Vtials      Vitals:             Height 5 ft 8 in / 172.72 cm           Weight 144 lbs 8 oz / 65.509700 kg           BSA 1.78 m2           BMI 22.0 kg/m2           Temperature 98 F / 36.67 C           Pulse 76           Respirations 14           Blood Pressure 131/77 Sitting, Left Arm           Pulse Oximetry 97%, room air            REVIEW      Results Reviewed      PCCS Results Reviewed?:  Yes Prev Lab Results, Yes Prev Radiology Results, Yes     Previous [facility] Records      Lab Results      I personally reviewed previous lab work, imaging and provider notes.            Assessment      Notes      New Medications      * TIOTROPIUM BROMIDE (Spiriva Respimat 2.5 mcg/Puff) 4 GM MIST.INHAL: 2 PUFFS       INH QDAY #1      Discontinued Medications      * TIOTROPIUM BROMIDE (Spiriva Respimat 1.25 mcg/puff) 4 GM MIST.INHAL: 2 PUFFS       INH RTQDAY #1      * predniSONE* 20 MG TABLET: 40 MG PO QDAY #10      * predniSONE* 10 MG TABLET: 10 MG PO ASDIR #48         Instructions: 60mg x 3days, 40mg x 3days, 30mg x 3days, 20mg x 3days, 10mg x        3days         Dx: Asthma - J45.909      ASSESSMENT:      1. Severe persistent asthma, better controlled, but still not well controlled.      2. Likely asthma/COPD overlap syndrome.      3. Allergic rhinitis, better controlled on Flonase.        4. Seasonal allergies, well-controlled on Zyrtec and Singulair.      5. Elevated IgE.      6. Peripheral  eosinophilia.      7. Gastroesophageal reflux disease.              PLAN:       1. At this time, I will have the patient continue on his current dose of Dulera     200 and will increase his Spiriva Respimat to 2.5 from 1.25.  I have sent this     to his pharmacy and have let his pharmacy know of the increased dose to ensure     he picks up the right inhaler.        2. I will recheck an IgE level for him today and if this remains elevated, then     I would have him start on Fasenra injections.  His asthma is still not well     controlled now on maximum medical therapy with LABA ICS and LAMA therapy.  He is    still requiring albuterol twice daily and has had two asthma exacerbations in     the past two months.        3.  I performed FENO testing for the patient today in the office resulting in     the level of 9.  This is much improved from his last visit when his level was     118.  He should continue on Singulair, Zyrtec and Flonase.        4. I will have him follow up in 2-3 months with Dr. Harris, sooner if needed.        2. We have talked about his inhaler regimen and he prefers to stay on the     Dulera. I will have him continue on Dulera 200 with 2 puffs twice daily along     with Spiriva Respimat 1.25.       3. I performed fractional exhaled nitric oxide testing for the patient today r    esulting in a level of 118. 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 His FeNO is likely so much higher today at 118 compared to 39 at     his last visit because he was confused about what to take and has not been     taking an inhaled steroid now for a couple of months.      4. I have given him a prednisone burst to take as needed. He tells me that his     pharmacy never had that, despite that being sent previously. I have encouraged     him to continue using albuterol nebs p.r.n. q.4-6 hours until this exacerbation     clears up.       5. He appears to have seasonal allergies that are not well  controlled and     allergic rhinitis that is not well controlled, so in addition to his Singulair     and cetirizine, I will add Flonase nasal spray.       6. I will also have him repeat an IgE level in 3 months since this was     significantly elevated at 214 when it was checked on 09/25/2018.       7. I will have him followup in 2-3 months with Dr. Harris or sooner if needed.            Patient Education      Patient Education Provided:  Acute Asthma, How to use an Inhaler      Time Spent:  > 50% /[facility]                 Disclaimer: Converted document may not contain table formatting or lab diagrams. Please see [employer] for the authenticated document.

## 2021-05-28 NOTE — PROGRESS NOTES
Patient: ACRRI ANTOINE     Acct: [id number]     Report: #[id number]  UNIT #: [id number]     : 1965    Encounter Date:2019  PRIMARY CARE: ANABELLA BOYD [facility]  ***Signed***  --------------------------------------------------------------------------------------------------------------------  Chief Complaint      Encounter Date      2019            Primary Care Provider      ANABELLA BOYD [facility]            Referring Provider      ANABELLA BOYD [facility]            Patient Complaint      Patient is complaining of      Patient here today for 5 month follow up, COPD            VITALS      Height 68 in / 172.72 cm      Weight 140 lbs  / 63.084747 kg      BSA 1.76 m2      BMI 21.3 kg/m2      Temperature 98.2 F / 36.78 C - Oral      Pulse 85      Respirations 14      Blood Pressure 109/83 Sitting, Left Arm      Pulse Oximetry 95%, room air      Initial Exhaled Nitrous Oxide      Exhaled Nitrous Oxide Results:  39            HPI      This is a 54 year old male patient of Dr. Yeung.  He has a history of severe     persistent asthma that has not been well-controlled despite triple inhaler     therapy with Dulera 200 mcg and Spiriva Respimat 2.5 mcg.  He also has a history    of peripheral eosinophilia, elevated IgE, history of asthma/COPD overlap     syndrome and gastroesophageal reflux disease.  He is still using his Ventolin     inhaler at least five times a week.  He is taking Singulair, Zyrtec and Flonase.     Our office had applied for Fasenra for him in the past and he had met all of     the diagnostic criteria, however medication was still denied.  A request was put    in for Dupixent back in 2019 and patient was approved for Dupixent, however     patient states that he still has not started medication.  Patient states that he    is still short of air and coughing up whitish sputum at times and is still     wheezing.  He denies any fever, chills, night sweats, weight loss,  hemoptysis,     chest pain, chest tightness, nausea, vomiting, swollen glands in head and neck.            I have personally reviewed the review of systems, past family, social, surgical     and medical histories and I agree with the findings.      Copies To:   Gonzalo Harris      Constitutional:  Denies: Fatigue, Fever, Weight gain, Weight loss, Chills,     Insomnia, Other      Respiratory/Breathing:  Complains of: Shortness of air, Wheezing, Cough; Denies:    Hemoptysis, Pleuritic pain, Other      Endocrine:  Denies: Polydipsia, Polyuria, Heat/cold intolerance, Diabetes, Other      Eyes:  Denies: Blurred vision, Vision Changes, Other      Ears, nose, mouth, throat:  Denies: Congestion, Dysphagia, Hearing Changes, Nose    Bleeding, Nasal Discharge, Throat pain, Tinnitus, Other      Cardiovascular:  Denies: Chest Pain, Exertional dyspnea, Peripheral Edema,     Palpitations, Syncope, Wake up Gasping for air, Orthopnea, Tachycardia, Other      Gastrointestinal:  Denies: Abdominal pain/cramping, Bloody stools, Constipation,    Diarrhea, Melena, Nausea, Vomiting, Other      Genitourinary:  Denies: Dysuria, Urinary frequency, Incontinence, Hematuria,     Urgency, Other      Musculoskeletal:  Denies: Joint Pain, Joint Stiffness, Joint Swelling, Myalgias,    Other      Hematologic/lymphatic:  DENIES: Lymphadenopathy, Bruising, Bleeding tendencies,     Other      Neurologic:  Denies: Headache, Numbness, Weakness, Seizures, Other      Psychiatric:  Denies: Anxiety, Appropriate Effect, Depression, Other      Sleep:  No: Excessive daytime sleep, Morning Headache?, Snoring, Insomnia?, Stop    breathing at sleep?, Other      Integumentary:  Denies: Rash, Dry skin, Skin Warm to Touch, Other            FAMILY/SOCIAL/MEDICAL HX      Surgical History:  Yes: Abdominal Surgery (HERNIA SURG), Head Surgery (HEAD     INJURY  SUTURED); No: AAA Repair, Angioplasty, Appendectomy, Back Surgery,     Bladder Surgery, Bowel Surgery,  Breast Surgery, CABG, Carotid Stenosis,     Cholecystectomy, Ear Surgery, Eye Surgery, Hernia Surgery, Kidney Surgery, Nose     Surgery, Oral Surgery, Orthopedic Surgery, Prostatectomy, Rectal Surgery, Spinal    Surgery, Testicular Surgery, Throat Surgery, Valve Replacement, Vascular     Surgery, Other Surgeries      Heart - Family Hx:  Mother, Father      Cancer/Type - Family Hx:  Father      Is Father Still Living?:  No      Is Mother Still Living?:  No       Family History:  Yes      Social History:  Tobacco Use; No Alcohol Use, No Recreational Drug use      Smoking status:  Former smoker (1 ppd for 15 years quit in 1989 )      Anticoagulation Therapy:  No      Antibiotic Prophylaxis:  No      Medical History:  Yes: Asthma, Hemorrhoids/Rectal Prob (REFLUX), High Blood     Pressure, Shortness Of Breath, Miscellaneous Medical/oth ([place] Spotted    Fever been treated, tested again was negative); No: Alcoholism, Allergies,     Anemia, Arthritis, Blood Disease, Broken Bones, Cataracts, Chemical Dependency,     Chemotherapy/Cancer, Chronic Bronchitis/COPD, Emphysema, Chronic Liver Disease,     Colon Trouble, Colitis, Diverticulitis, Congestive Heart Failu, Deafness or     Ringing Ears, Convulsions, Depression, Anxiety, Bipolar Disorder, Diabetes,     Epilepsy, Seizures, Forgetfullness, Glaucoma, Gall Stones, Gout, Head Injury,     Heart Attack, Heart Murmur, Hepatitis, Hiatal Hernia, High Cholesterol, HIV (Do     not ask - volu, Jaundice, Kidney or Bladder Disease, Kidney Stones, Migrane     Headaches, Mitral Valve Prolapse, Night sweats, Phlebitis, Psychiatric Care,     Reflux Disease, Rheumatic Fever, Sexually Transmitted Dis, Sinus Trouble, Skin     Disease/Psoriais/Ecz, Stroke, Thyroid Problem, Tuberculosis or Pos TB Te      Psychiatric History      none            PREVENTION      Hx Influenza Vaccination:  No      Influenza Vaccine Declined:  Yes      2 or More Falls Past Year?:  No      Fall Past Year  with Injury?:  No      Hx Pneumococcal Vaccination:  No      Encouraged to follow-up with:  PCP regarding preventative exams.      Chart initiated by      Rowan Amato CMA            ALLERGIES/MEDICATIONS      Allergies:        Coded Allergies:             *No Known Allergies (Verified  Allergy, Mild, 11/14/19)      Medications    Last Reconciled on 11/14/19 15:33 by JOSE MANUEL GREGORY       predniSONE* (predniSONE*) 20 Mg Tablet      40 MG PO QDAY for 5 Days, #10 TAB 0 Refills         Prov: JOSE MANUEL GREGORY [facility]         11/14/19       Montelukast Sodium (Singulair*) 10 Mg Tab      10 MG PO QDAY, #30 TAB 11 Refills         Prov: Arianne Barrios PA-C         6/19/19       Tiotropium Bromide (Spiriva Respimat 2.5 mcg/Puff) 4 Gm Mist.inhal      2 PUFFS INH QDAY, #1 MDI 11 Refills         Prov: Arianne Barrios PA-C         6/19/19       NEB-Albuterol Sulf (Albuterol) 2.5 Mg/3 Ml Vial.neb      2.5 MG INH Q4H PRN for SHORTNESS OF BREATH, #120 NEB 5 Refills         Prov: Arianne Barrios PA-C         6/19/19       Mometasone/Formoterol (Dulera 200 Mcg/5 Mcg) 13 Gm Hfa.aer.ad      2 PUFFS INH RTBID, #1 MDI 11 Refills         Prov: Arianne Barrios PA-C         6/19/19       MDI-Albuterol (Ventolin HFA) 8 Gm Hfa.aer.ad      2 PUFFS INH Q4-6H PRN for DYSPNEA, #1 INH 5 Refills         Prov: Arianne Barrios PA-C         6/19/19       Jaycob-Fluticasone (Fluticasone 50 mcg) 16 Gm Spray.susp      2 PUFFS NARE EACH QDAY, #1 BOTTLE 9 Refills         Prov: Gonzalo Harris         3/1/19       MDI-Albuterol (Ventolin HFA) 18 Gm Hfa.aer.ad      2 PUFFS INH Q4H PRN for SHORTNESS OF BREATH, #1 INH 0 Refills         Reported         7/23/18       Lisinopril* (Lisinopril*) 20 Mg Tablet      20 MG PO QDAY, #30 TAB 0 Refills         Reported         7/23/18       Hydrochlorothiazide (Hydrochlorothiazide*) 25 Mg Tablet      25 MG PO QDAY, #30 TAB 0 Refills         Reported         7/23/18       Cetirizine Hcl (CETIRIZINE HCL) 10 Mg Tablet       10 MG PO QDAY, #30 TAB 0 Refills         Reported         7/23/18       Lovastatin (Lovastatin) 20 Mg Tablet      20 MG PO HS for 30 Days, #30 TAB         Reported         7/23/18       Omeprazole (Omeprazole*) 20 Mg Tablet.dr      20 MG PO QDAY, #30 CAP 0 Refills         Reported         7/23/18      Current Medications      Current Medications Reviewed 11/14/19            EXAM      CONSTITUTIONAL: well built, well nourished; no acute distress. Pleasant  normal     conversant.       EYES : Pink conjunctive, no ptosis, PERRL.       ENMT : Nose and ears appear normal, normal dentition, mild posterior pharyngeal     wall erythema, no sinus tenderness.       Neck: Nontender, no masses, no thyromegaly, no nodules.      Resp : Lungs with mildly decreased breath sounds throughout with trace     expiratory wheezing, no rales, crackles or rhonchi, normal work of breathing.        CVS  : No carotid bruits, s1s2 nl, RRR, no murmur, rubs or gallop, no peripheral    edema       Chest wall: Normal rise with inspiration, nontender on palpation.      GI   : Abdomen soft, with no masses, no hepatosplenomegaly, no hernias, BS+      MSK  : Normal gait and station, no digital cyanosis or clubbing       Skin : No rashes, ulcerations or lesions, normal turgor and temperature      Neuro: CN II - XII intact, no sensory deficits, DTRs intact and symmetrical, no     motor weakness      Psych: Appropriate affect, A   Vitals      Vitals:             Height 68 in / 172.72 cm           Weight 140 lbs  / 63.033401 kg           BSA 1.76 m2           BMI 21.3 kg/m2           Temperature 98.2 F / 36.78 C - Oral           Pulse 85           Respirations 14           Blood Pressure 109/83 Sitting, Left Arm           Pulse Oximetry 95%, room air            REVIEW      Results Reviewed      PCCS Results Reviewed?:  Yes Prev Lab Results, Yes Prev Radiology Results, Yes     Previous [facility] Records            Assessment      Severe persistent asthma -  J45.50            Elevated IgE level - R76.8            Peripheral eosinophilia - D72.1            Notes      New Medications      * predniSONE* 20 MG TABLET: 40 MG PO QDAY 5 Days #10      Discontinued Medications      * predniSONE* 20 MG TABLET: 40 MG PO QDAY 5 Days #10      New Office Procedures      * Specialty Drug DUPIXENT, Routine         Dx: Severe persistent asthma - J45.50      * Flu Vacc Fluarix Quadrivalent, Routine         Flu Vacc [id number](6Mos Up)/Pf (Fluarix Quadrivalent [id number] Syringe) 60        MCG/0.5 ML SYRINGE:         60 MICROGRAM INTRAMUSCULARLY Qty 1 SYRINGE      ASSESSMENT:       1.  Severe persistent asthma, not well-controlled despite triple inhaler     therapy.      2.  Asthma/COPD overlap syndrome.      3.  Peripheral eosinophilia.      4. Elevated IgE.      5. Recurrent asthma exacerbations.      6.  Allergic rhinitis.      7. Gastroesophageal reflux disease.            PLAN:      1.  Patient was denied Fasenra despite meeting all diagnostic criteria.  Patient    was approved for Dupixent back in 08/2019, however patient has not started     medication yet.  We will order Dupixent today which will be a better option for     the patient as he is able to inject himself at home.      2.  Continue with Dulera and Spiriva as prescribed.  Continue Ventolin haler as     needed.      3. Continue Singulair, Zyrtec and Flonase for his allergies and allergic     rhinitis.      4. Will prescribe prednisone burst as patient is wheezing in the office today.        5. Patient received flu vaccine in office today.      6. Continue Prilosec for gastroesophageal reflux disease as prescribed.        7.  Follow up in 2-3 months with Dr. Harris, sooner if needed.            Patient Education      Patient Education Provided:  Acute Asthma      Time Spent:  > 50% /Coord Care            Electronically signed by JOSE MANUEL GREGORY [facility]  11/18/2019 10:28       Disclaimer: Converted document may not contain  table formatting or lab diagrams. Please see [employer] for the authenticated document.

## 2021-06-07 ENCOUNTER — TELEPHONE (OUTPATIENT)
Dept: FAMILY MEDICINE CLINIC | Facility: CLINIC | Age: 56
End: 2021-06-07

## 2021-06-07 DIAGNOSIS — I10 ESSENTIAL HYPERTENSION: Primary | ICD-10-CM

## 2021-06-07 RX ORDER — LISINOPRIL 20 MG/1
20 TABLET ORAL 2 TIMES DAILY
Qty: 60 TABLET | Refills: 0 | Status: SHIPPED | OUTPATIENT
Start: 2021-06-07 | End: 2021-07-01 | Stop reason: SDUPTHER

## 2021-06-07 NOTE — TELEPHONE ENCOUNTER
Due to low sodium on [facility] dates 4-29, pt was instructed to stop HCTZ and take 2 Lisinopril. He has been out of Lisinopril all wkend.

## 2021-06-07 NOTE — TELEPHONE ENCOUNTER
Pt called and stated that he was told to stop his HCTZ and to take 2 of his lisinopril 20mg. States his BP is still running high. This AM it is 194/122 and states head is pounding. Pt states  top number has been 160 to 190 and bottom number is usually over 100. He needs Lisinopril refilled if he is to continue. Please advise.  [phone]

## 2021-06-11 ENCOUNTER — OFFICE VISIT (OUTPATIENT)
Dept: UROLOGY | Facility: CLINIC | Age: 56
End: 2021-06-11

## 2021-06-11 VITALS — HEIGHT: 68 IN | WEIGHT: 142.4 LBS | BODY MASS INDEX: 21.58 KG/M2

## 2021-06-11 DIAGNOSIS — K40.91 UNILATERAL RECURRENT INGUINAL HERNIA WITHOUT OBSTRUCTION OR GANGRENE: ICD-10-CM

## 2021-06-11 DIAGNOSIS — N50.82 SCROTAL PAIN: Primary | ICD-10-CM

## 2021-06-11 PROCEDURE — 99203 OFFICE O/P NEW LOW 30 MIN: CPT | Performed by: UROLOGY

## 2021-06-11 RX ORDER — PANTOPRAZOLE SODIUM 40 MG/1
40 TABLET, DELAYED RELEASE ORAL DAILY
COMMUNITY
End: 2021-07-01 | Stop reason: SDUPTHER

## 2021-06-11 RX ORDER — CETIRIZINE HYDROCHLORIDE 10 MG/1
TABLET ORAL
COMMUNITY
Start: 2021-05-19 | End: 2021-07-01 | Stop reason: SDUPTHER

## 2021-06-11 RX ORDER — MONTELUKAST SODIUM 10 MG/1
TABLET ORAL
COMMUNITY
Start: 2021-05-21 | End: 2021-07-01 | Stop reason: SDUPTHER

## 2021-06-11 NOTE — PROGRESS NOTES
"Chief Complaint: Urologic complaint    Subjective         History of Present Illness  Bentley Martínez is a 55 y.o. male presents to [facility] to be seen for right scrotal pain    Patient comes in today having a bulge in his right scrotum that comes every couple months and has a lot of pain.  He can usually reduce this.  Does not hurt when is not bulging.    Remote history of right inguinal hernia repair.  Not sure if they used mesh.  1987    Voiding without issue.     no gross hematuria.  One kidney stone in the past back spontaneously    No urologic family history,   Has never had any urologic surgery.    CAD, patient used to have a cardiologist does not currently have 1.   Former smoker quit in 89.  Patient does not use blood thinner.  Asthma    5/21 scrotal ultrasound-negative  4/21 creatinine 0.9, GFR>60    PSA    1/20 1.0        Past Medical History:   Diagnosis Date   • Alcohol abuse 01/28/2021   • Allergic rhinitis due to allergen 10/24/2018   • Asthma    • Condition not found     Hernia   • Essential hypertension 10/24/2018   • GERD (gastroesophageal reflux disease) 10/24/2018   • High cholesterol    • Hyperlipidemia 10/24/2018   • Hypertension    • Laryngeal candidiasis    • Voice hoarseness        Past Surgical History:   Procedure Laterality Date   • CYST REMOVAL      Back of neck   • HERNIA REPAIR      \"30 years ago\"         Current Outpatient Medications:   •  lisinopril (PRINIVIL,ZESTRIL) 20 MG tablet, Take 1 tablet by mouth 2 (two) times a day for 30 days., Disp: 60 tablet, Rfl: 0  •  pantoprazole (PROTONIX) 40 MG EC tablet, Take 40 mg by mouth Daily., Disp: , Rfl:   •  cetirizine (zyrTEC) 10 MG tablet, , Disp: , Rfl:   •  montelukast (SINGULAIR) 10 MG tablet, , Disp: , Rfl:     No Known Allergies     Family History   Problem Relation Age of Onset   • Heart disease Mother    • Heart disease Father    • Heart disease Brother        Social History     Socioeconomic History   • " "Marital status: Single     Spouse name: Not on file   • Number of children: Not on file   • Years of education: Not on file   • Highest education level: Not on file   Tobacco Use   • Smoking status: Former Smoker     Quit date:      Years since quittin.4   Substance and Sexual Activity   • Alcohol use: Yes     Comment: Current every day  30 servings per week       Vital Signs:   Ht 172.7 cm (68\")   Wt 64.6 kg (142 lb 6.4 oz)   BMI 21.65 kg/m²      Physical exam    Alert and orient x3  Well appearing, well developed, in no acute distress   Unlabored respirations  Nontender/nondistended    Normal circumcised phallus, bilateral descended testicles without mass or tenderness.  Possible bilateral inguinal hernias    Grossly oriented to person, place and time, judgment is intact, normal mood and affect    Results for orders placed or performed during the hospital encounter of 21   Physical Test Panel Including PSA Screen   Result Value Ref Range    Glucose 91 70 - 99 mg/dL    BUN 10 5 - 25 mg/dL    Creatinine 0.92 0.70 - 1.20 mg/dL    BUN/Creatinine Ratio 11 6 - 20 [ratio]    GFR >60 >60 mL/min/[1.73_m2]    Sodium 134 (L) 135 - 147 mmol/L    Potassium 3.4 (L) 3.5 - 5.3 mmol/L    Chloride 92 (L) 99 - 111 mmol/L    CO2 27 22 - 32 mmol/L    Anion Gap 18 8 - 19 mmol/L    OSMOLALITY CALC 277 273 - 304    Total Protein 7.8 6.3 - 8.2 g/dL    Albumin 5.0 3.5 - 5.0 g/dL    Globulin 2.8 2.0 - 3.5 g/dL    A/G Ratio 1.8 1.4 - 2.6 [ratio]    Calcium 9.6 8.7 - 10.4 mg/dL    Alkaline Phosphatase 60 56 - 119 U/L    ALT (SGPT) 25 10 - 40 U/L    AST (SGOT) 29 15 - 50 U/L    Total Bilirubin 0.19 (L) 0.20 - 1.30 mg/dL    WBC 7.88 4.80 - 10.80 10*3/uL    RBC 4.11 (L) 4.70 - 6.10 10*6/uL    Hemoglobin 13.0 (L) 14.0 - 18.0 g/dL    Hematocrit 38.0 (L) 42.0 - 52.0 %    MCV 92.5 80.0 - 96.0 fL    MCH 31.6 (H) 27.0 - 31.0 pg    MCHC 34.2 33.0 - 37.0    RDW 13.0 11.6 - 14.4 %    Platelets 442 (H) 130 - 400 10*3/uL    MPV 9.2 (L) " 9.4 - 12.4 fL    Neutrophil Rel % 46.2 30.0 - 85.0 %    Lymphocyte Rel % 37.3 20.0 - 45.0 %    Monocyte Rel % 12.4 (H) 3.0 - 10.0 %    Eosinophil Rel % 3.0 0.0 - 7.0 %    Basophil Rel % 0.6 0.0 - 3.0 %    Neutrophils Absolute 3.63 2.00 - 8.00 10*3/uL    Lymphocytes Absolute 2.94 1.00 - 5.00 10*3/uL    Monocytes Absolute 0.98 0.20 - 1.20 10*3/uL    Eosinophils Absolute 0.24 0.00 - 0.70 10*3/uL    Basophils Absolute 0.05 0.00 - 0.20 10*3/uL    Immature Granulocyte Rel % 0.5 0.0 - 1.8 %    Abs Imm Gran 0.04 0.00 - 0.20 10*3/uL    RDW-SD 44.1 (H) 35.1 - 43.9 fL    NRBC 0.00 0.00 - 0.70 %    TSH 1.570 0.270 - 4.200 m[iU]/L    Triglycerides 119 40 - 150 mg/dL    Total Cholesterol 188 107 - 200 mg/dL    HDL Cholesterol 89 (H) 40 - 60 mg/dL    Chol/HDL Ratio 2.1 (L) 3.0 - 6.0 NA    LDL Cholesterol  75 70 - 100 mg/dL    VLDL Cholesterol 24 5 - 37 mg/dL    PSA 0.70 0.00 - 4.00 ng/mL   Iron Profile   Result Value Ref Range    Iron 94 70 - 180 ug/dL    TIBC 406 245 - 450 ug/dL    Iron Saturation 23 20 - 55 %    Transferrin 284.00 215.00 - 365.00 mg/dL             Assessment and Plan    Diagnoses and all orders for this visit:    1. Scrotal pain (Primary)    Discussed with the patient no signs of abnormality in the scrotum, ultrasound records reviewed today and summarized in chart    Based on his history I do think he has recurrent right inguinal hernia.  This was discussed with general surgeon they will work him in the next few days.  Discussed with the patient it can be an emergency if he gets a bulge and cannot get this reduced I would want him to go immediately to the emergency room and patient voiced understanding.  Patient understands of detrimental to his health or cause death if he did not.    Follow-up with general surgery, follow with me as needed

## 2021-06-23 RX ORDER — LISINOPRIL 2.5 MG/1
TABLET ORAL
COMMUNITY
End: 2021-07-01

## 2021-06-23 RX ORDER — LOVASTATIN 20 MG/1
TABLET ORAL
COMMUNITY
Start: 2021-05-19 | End: 2021-07-01 | Stop reason: SDUPTHER

## 2021-06-23 RX ORDER — ALBUTEROL SULFATE 0.63 MG/3ML
SOLUTION RESPIRATORY (INHALATION)
COMMUNITY
End: 2021-07-27

## 2021-06-23 RX ORDER — MOMETASONE FUROATE AND FORMOTEROL FUMARATE DIHYDRATE 200; 5 UG/1; UG/1
2 AEROSOL RESPIRATORY (INHALATION)
COMMUNITY
Start: 2021-05-19 | End: 2022-03-22 | Stop reason: SDUPTHER

## 2021-06-23 RX ORDER — ALBUTEROL SULFATE 90 UG/1
AEROSOL, METERED RESPIRATORY (INHALATION)
COMMUNITY
Start: 2021-04-22 | End: 2021-07-06

## 2021-06-23 RX ORDER — HYDROCHLOROTHIAZIDE 25 MG/1
TABLET ORAL
COMMUNITY
Start: 2021-05-19 | End: 2021-07-27

## 2021-06-23 RX ORDER — PREDNISONE 20 MG/1
TABLET ORAL
COMMUNITY
End: 2021-07-27

## 2021-06-23 RX ORDER — FLUTICASONE PROPIONATE 50 MCG
SPRAY, SUSPENSION (ML) NASAL
COMMUNITY
End: 2021-07-27

## 2021-06-23 RX ORDER — TIOTROPIUM BROMIDE 18 UG/1
1 CAPSULE ORAL; RESPIRATORY (INHALATION)
COMMUNITY
Start: 2021-06-05 | End: 2022-03-22 | Stop reason: SDUPTHER

## 2021-06-23 RX ORDER — LOVASTATIN 10 MG/1
TABLET ORAL
COMMUNITY
End: 2021-07-27

## 2021-06-28 ENCOUNTER — OFFICE VISIT (OUTPATIENT)
Dept: SURGERY | Facility: CLINIC | Age: 56
End: 2021-06-28

## 2021-06-28 ENCOUNTER — PREP FOR SURGERY (OUTPATIENT)
Dept: OTHER | Facility: HOSPITAL | Age: 56
End: 2021-06-28

## 2021-06-28 VITALS — WEIGHT: 140.4 LBS | BODY MASS INDEX: 21.28 KG/M2 | HEIGHT: 68 IN | RESPIRATION RATE: 14 BRPM

## 2021-06-28 DIAGNOSIS — K40.90 RIGHT INGUINAL HERNIA: Primary | ICD-10-CM

## 2021-06-28 PROCEDURE — 99203 OFFICE O/P NEW LOW 30 MIN: CPT | Performed by: SURGERY

## 2021-06-28 RX ORDER — CEFAZOLIN SODIUM 2 G/100ML
2 INJECTION, SOLUTION INTRAVENOUS ONCE
Status: CANCELLED | OUTPATIENT
Start: 2021-06-28 | End: 2021-06-28

## 2021-06-28 RX ORDER — SODIUM CHLORIDE, SODIUM LACTATE, POTASSIUM CHLORIDE, CALCIUM CHLORIDE 600; 310; 30; 20 MG/100ML; MG/100ML; MG/100ML; MG/100ML
70 INJECTION, SOLUTION INTRAVENOUS CONTINUOUS
Status: CANCELLED | OUTPATIENT
Start: 2021-06-28

## 2021-06-28 RX ORDER — SODIUM CHLORIDE 0.9 % (FLUSH) 0.9 %
10 SYRINGE (ML) INJECTION AS NEEDED
Status: CANCELLED | OUTPATIENT
Start: 2021-06-28

## 2021-06-28 RX ORDER — SODIUM CHLORIDE 0.9 % (FLUSH) 0.9 %
3 SYRINGE (ML) INJECTION EVERY 12 HOURS SCHEDULED
Status: CANCELLED | OUTPATIENT
Start: 2021-06-28

## 2021-06-28 RX ORDER — ONDANSETRON 2 MG/ML
4 INJECTION INTRAMUSCULAR; INTRAVENOUS EVERY 6 HOURS PRN
Status: CANCELLED | OUTPATIENT
Start: 2021-06-28

## 2021-06-28 NOTE — PROGRESS NOTES
Inpatient History and Physical Surgical Orders    Preadmission Location:   Preadmission Time:  Facility:  Surgery Date:  Surgery Time:  Preadmission Test date:     Chief Complaint  Outpatient History and Physical / Surgical Orders    Primary Care Provider: Juhi Goins APRN    Referring Provider: No ref. provider found    Subjective      Patient Name: Bentley Martínez : 1965    HPI  The patient is a 55-year-old gentleman who has a symptomatic right inguinal hernia.  It occasionally will bulge out and he has had to manually reduce it in the past.  It is more uncomfortable now that it has been before.  He did have a right inguinal hernia repair done open as a child.    Past History:  Medical History: has a past medical history of Alcohol abuse (2021), Allergic rhinitis due to allergen (10/24/2018), Asthma, Condition not found, Essential hypertension (10/24/2018), GERD (gastroesophageal reflux disease) (10/24/2018), High cholesterol, Hyperlipidemia (10/24/2018), Hypertension, Laryngeal candidiasis, and Voice hoarseness.   Surgical History: has a past surgical history that includes Cyst Removal and Hernia repair.   Family History: family history includes Heart disease in his brother, father, and mother.   Social History: reports that he quit smoking about 32 years ago. He does not have any smokeless tobacco history on file. He reports current alcohol use.  Allergies: Patient has no known allergies.       Current Outpatient Medications:   •  albuterol (ACCUNEB) 0.63 MG/3ML nebulizer solution, albuterol sulfate 0.63 mg/3 mL inhalation solution for nebulization use in nebulizer as directed   Suspended, Disp: , Rfl:   •  cetirizine (zyrTEC) 10 MG tablet, , Disp: , Rfl:   •  Dulera 200-5 MCG/ACT inhaler, , Disp: , Rfl:   •  fluticasone (FLONASE) 50 MCG/ACT nasal spray, fluticasone 50 mcg/actuation nasal spray,suspension spray 1 spray (50 mcg) in each nostril by intranasal route once daily   Active,  "Disp: , Rfl:   •  lisinopril (PRINIVIL,ZESTRIL) 20 MG tablet, Take 1 tablet by mouth 2 (two) times a day for 30 days. (Patient taking differently: Take 40 mg by mouth 2 (two) times a day.), Disp: 60 tablet, Rfl: 0  •  lovastatin (MEVACOR) 10 MG tablet, lovastatin 10 mg oral tablet take 1 tablet (10 mg) by oral route once daily with the evening meal   Suspended, Disp: , Rfl:   •  lovastatin (MEVACOR) 20 MG tablet, , Disp: , Rfl:   •  mometasone-formoterol (Dulera) 100-5 MCG/ACT inhaler, Dulera 100-5 mcg/actuation inhalation HFA aerosol inhaler inhale 2 puffs by inhalation route 2 times per day in the morning and evening   Suspended, Disp: , Rfl:   •  montelukast (SINGULAIR) 10 MG tablet, , Disp: , Rfl:   •  pantoprazole (PROTONIX) 40 MG EC tablet, Take 40 mg by mouth Daily., Disp: , Rfl:   •  predniSONE (DELTASONE) 20 MG tablet, prednisone 20 mg oral tablet take 20 tablets by oral route QD for 5 days For emergency use only   Suspended, Disp: , Rfl:   •  Spiriva HandiHaler 18 MCG per inhalation capsule, , Disp: , Rfl:   •  Ventolin  (90 Base) MCG/ACT inhaler, , Disp: , Rfl:   •  hydroCHLOROthiazide (HYDRODIURIL) 25 MG tablet, , Disp: , Rfl:   •  lisinopril (PRINIVIL,ZESTRIL) 2.5 MG tablet, lisinopril 2.5 mg oral tablet take 1 tablet (2.5 mg) by oral route once daily   Suspended, Disp: , Rfl:        Objective   Vital Signs:   Resp 14   Ht 172.7 cm (68\")   Wt 63.7 kg (140 lb 6.4 oz)   BMI 21.35 kg/m²       Physical Exam  Vitals and nursing note reviewed.   Constitutional:       Appearance: Normal appearance. He is well-developed.   Cardiovascular:      Rate and Rhythm: Normal rate and regular rhythm.   Pulmonary:      Effort: Pulmonary effort is normal.      Breath sounds: Normal air entry.   Abdominal:      General: Bowel sounds are normal.      Palpations: Abdomen is soft.      Skin:     General: Skin is warm and dry.   Neurological:      Mental Status: He is alert and oriented to person, place, and time.    "   Motor: Motor function is intact.   Psychiatric:         Mood and Affect: Mood normal.   Groin: Right inguinal hernia noted    Result Review :               Assessment and Plan   Diagnoses and all orders for this visit:    1. Right inguinal hernia (Primary)    We will proceed with a robotic right inguinal hernia repair.  I have described the procedure to him as well as the risk and benefits and he is agreeable to proceeding.    I  Orlando Leon MD  06/28/2021

## 2021-07-01 ENCOUNTER — OFFICE VISIT (OUTPATIENT)
Dept: FAMILY MEDICINE CLINIC | Facility: CLINIC | Age: 56
End: 2021-07-01

## 2021-07-01 VITALS
BODY MASS INDEX: 21.9 KG/M2 | DIASTOLIC BLOOD PRESSURE: 90 MMHG | TEMPERATURE: 97.8 F | HEART RATE: 71 BPM | WEIGHT: 144.5 LBS | OXYGEN SATURATION: 98 % | HEIGHT: 68 IN | RESPIRATION RATE: 18 BRPM | SYSTOLIC BLOOD PRESSURE: 148 MMHG

## 2021-07-01 DIAGNOSIS — E87.1 HYPONATREMIA: ICD-10-CM

## 2021-07-01 DIAGNOSIS — K40.90 RIGHT INGUINAL HERNIA: ICD-10-CM

## 2021-07-01 DIAGNOSIS — Z00.00 ENCOUNTER FOR ANNUAL PHYSICAL EXAM: ICD-10-CM

## 2021-07-01 DIAGNOSIS — I10 ESSENTIAL HYPERTENSION: Primary | ICD-10-CM

## 2021-07-01 DIAGNOSIS — F10.10 ALCOHOL ABUSE: ICD-10-CM

## 2021-07-01 DIAGNOSIS — K92.89 GAS BLOAT SYNDROME: ICD-10-CM

## 2021-07-01 DIAGNOSIS — E78.2 MIXED HYPERLIPIDEMIA: ICD-10-CM

## 2021-07-01 DIAGNOSIS — J45.40 MODERATE PERSISTENT ASTHMA, UNSPECIFIED WHETHER COMPLICATED: ICD-10-CM

## 2021-07-01 DIAGNOSIS — J30.9 ALLERGIC RHINITIS, UNSPECIFIED SEASONALITY, UNSPECIFIED TRIGGER: ICD-10-CM

## 2021-07-01 DIAGNOSIS — K21.9 GASTROESOPHAGEAL REFLUX DISEASE WITHOUT ESOPHAGITIS: ICD-10-CM

## 2021-07-01 PROBLEM — E78.5 HYPERLIPIDEMIA: Status: ACTIVE | Noted: 2018-10-24

## 2021-07-01 PROBLEM — J45.909 ASTHMA: Status: ACTIVE | Noted: 2021-07-01

## 2021-07-01 LAB
ALBUMIN SERPL-MCNC: 4.4 G/DL (ref 3.5–5.2)
ALBUMIN/GLOB SERPL: 1.8 G/DL
ALP SERPL-CCNC: 57 U/L (ref 39–117)
ALT SERPL W P-5'-P-CCNC: 17 U/L (ref 1–41)
ANION GAP SERPL CALCULATED.3IONS-SCNC: 11.4 MMOL/L (ref 5–15)
AST SERPL-CCNC: 21 U/L (ref 1–40)
BASOPHILS # BLD AUTO: 0.05 10*3/MM3 (ref 0–0.2)
BASOPHILS NFR BLD AUTO: 0.7 % (ref 0–1.5)
BILIRUB SERPL-MCNC: 0.3 MG/DL (ref 0–1.2)
BUN SERPL-MCNC: 13 MG/DL (ref 6–20)
BUN/CREAT SERPL: 13 (ref 7–25)
CALCIUM SPEC-SCNC: 9.1 MG/DL (ref 8.6–10.5)
CHLORIDE SERPL-SCNC: 104 MMOL/L (ref 98–107)
CHOLEST SERPL-MCNC: 201 MG/DL (ref 0–200)
CO2 SERPL-SCNC: 25.6 MMOL/L (ref 22–29)
CREAT SERPL-MCNC: 1 MG/DL (ref 0.76–1.27)
DEPRECATED RDW RBC AUTO: 43.4 FL (ref 37–54)
EOSINOPHIL # BLD AUTO: 0.44 10*3/MM3 (ref 0–0.4)
EOSINOPHIL NFR BLD AUTO: 6 % (ref 0.3–6.2)
ERYTHROCYTE [DISTWIDTH] IN BLOOD BY AUTOMATED COUNT: 13 % (ref 12.3–15.4)
GFR SERPL CREATININE-BSD FRML MDRD: 77 ML/MIN/1.73
GLOBULIN UR ELPH-MCNC: 2.4 GM/DL
GLUCOSE SERPL-MCNC: 86 MG/DL (ref 65–99)
HCT VFR BLD AUTO: 39.8 % (ref 37.5–51)
HDLC SERPL-MCNC: 64 MG/DL (ref 40–60)
HGB BLD-MCNC: 13.7 G/DL (ref 13–17.7)
IMM GRANULOCYTES # BLD AUTO: 0.03 10*3/MM3 (ref 0–0.05)
IMM GRANULOCYTES NFR BLD AUTO: 0.4 % (ref 0–0.5)
LDLC SERPL CALC-MCNC: 107 MG/DL (ref 0–100)
LDLC/HDLC SERPL: 1.59 {RATIO}
LYMPHOCYTES # BLD AUTO: 1.85 10*3/MM3 (ref 0.7–3.1)
LYMPHOCYTES NFR BLD AUTO: 25.1 % (ref 19.6–45.3)
MCH RBC QN AUTO: 31.4 PG (ref 26.6–33)
MCHC RBC AUTO-ENTMCNC: 34.4 G/DL (ref 31.5–35.7)
MCV RBC AUTO: 91.3 FL (ref 79–97)
MONOCYTES # BLD AUTO: 0.84 10*3/MM3 (ref 0.1–0.9)
MONOCYTES NFR BLD AUTO: 11.4 % (ref 5–12)
NEUTROPHILS NFR BLD AUTO: 4.17 10*3/MM3 (ref 1.7–7)
NEUTROPHILS NFR BLD AUTO: 56.4 % (ref 42.7–76)
NRBC BLD AUTO-RTO: 0 /100 WBC (ref 0–0.2)
PLATELET # BLD AUTO: 360 10*3/MM3 (ref 140–450)
PMV BLD AUTO: 9.8 FL (ref 6–12)
POTASSIUM SERPL-SCNC: 4.3 MMOL/L (ref 3.5–5.2)
PROT SERPL-MCNC: 6.8 G/DL (ref 6–8.5)
RBC # BLD AUTO: 4.36 10*6/MM3 (ref 4.14–5.8)
SODIUM SERPL-SCNC: 141 MMOL/L (ref 136–145)
TRIGL SERPL-MCNC: 176 MG/DL (ref 0–150)
TSH SERPL DL<=0.05 MIU/L-ACNC: 1.35 UIU/ML (ref 0.27–4.2)
VLDLC SERPL-MCNC: 30 MG/DL (ref 5–40)
WBC # BLD AUTO: 7.38 10*3/MM3 (ref 3.4–10.8)

## 2021-07-01 PROCEDURE — 99396 PREV VISIT EST AGE 40-64: CPT | Performed by: NURSE PRACTITIONER

## 2021-07-01 PROCEDURE — 80061 LIPID PANEL: CPT | Performed by: NURSE PRACTITIONER

## 2021-07-01 PROCEDURE — 80050 GENERAL HEALTH PANEL: CPT | Performed by: NURSE PRACTITIONER

## 2021-07-01 RX ORDER — MONTELUKAST SODIUM 10 MG/1
10 TABLET ORAL NIGHTLY
Qty: 30 TABLET | Refills: 5 | Status: SHIPPED | OUTPATIENT
Start: 2021-07-01 | End: 2021-07-27

## 2021-07-01 RX ORDER — SPIRONOLACTONE 25 MG/1
25 TABLET ORAL DAILY
Qty: 30 TABLET | Refills: 2 | Status: SHIPPED | OUTPATIENT
Start: 2021-07-01 | End: 2021-07-27

## 2021-07-01 RX ORDER — LOVASTATIN 20 MG/1
20 TABLET ORAL NIGHTLY
Qty: 30 TABLET | Refills: 5 | Status: SHIPPED | OUTPATIENT
Start: 2021-07-01 | End: 2021-07-27

## 2021-07-01 RX ORDER — CETIRIZINE HYDROCHLORIDE 10 MG/1
10 TABLET ORAL DAILY
Qty: 30 TABLET | Refills: 11 | Status: SHIPPED | OUTPATIENT
Start: 2021-07-01 | End: 2022-03-22 | Stop reason: SDUPTHER

## 2021-07-01 RX ORDER — PANTOPRAZOLE SODIUM 40 MG/1
40 TABLET, DELAYED RELEASE ORAL DAILY
Qty: 30 TABLET | Refills: 5 | Status: SHIPPED | OUTPATIENT
Start: 2021-07-01 | End: 2022-03-22 | Stop reason: SDUPTHER

## 2021-07-01 RX ORDER — LISINOPRIL 40 MG/1
40 TABLET ORAL DAILY
Qty: 30 TABLET | Refills: 5 | Status: SHIPPED | OUTPATIENT
Start: 2021-07-01 | End: 2022-03-22 | Stop reason: SDUPTHER

## 2021-07-01 RX ORDER — SIMETHICONE 80 MG
80 TABLET,CHEWABLE ORAL EVERY 6 HOURS PRN
Qty: 30 TABLET | Refills: 2 | Status: SHIPPED | OUTPATIENT
Start: 2021-07-01 | End: 2022-03-22 | Stop reason: SDUPTHER

## 2021-07-01 NOTE — PROGRESS NOTES
"Chief Complaint  Hypertension    Subjective          Bentley Martínez presents to [facility]  History of Present Illness    Here for physical and medication refills on chronic disease meds for hypertension, hyperlipidemia, acid reflux, allergies and asthma.    Inguinal hernia, he is going to have surgery for repair with Dr. Leon on .    He complains of gas, which is relieved immediately with Gas-X chewables.  He says they are expensive and is asking for a prescription.  He thinks it is worse because of his hernia.    Alcohol use, he is drinking 8 beers per night.  He has tried to cut down.  Declines any help with AA or counseling.    He has had Covid vaccination    Hyponatremia noted at last visit, we held the HCTZ and he failed to follow-up for repeat blood work.  His blood pressure went up and I had increased his dose of lisinopril to 40 mg.  He is ready for labs this morning.      Past Medical History:   • Alcohol abuse   • Allergic rhinitis due to allergen   • Asthma   • Condition not found    Hernia   • Essential hypertension   • GERD (gastroesophageal reflux disease)   • High cholesterol   • Hyperlipidemia   • Hypertension   • Laryngeal candidiasis   • Voice hoarseness       Allergies  Patient has no known allergies.    Past Surgical History:   • CYST REMOVAL    Back of neck   • HERNIA REPAIR    \"30 years ago\"       Social History     Tobacco Use   • Smoking status: Former Smoker     Packs/day: 1.50     Years: 12.00     Pack years: 18.00     Types: Cigarettes     Quit date:      Years since quittin.5   • Smokeless tobacco: Never Used   Substance Use Topics   • Alcohol use: Yes     Comment: Current every day  30 servings per week   • Drug use: Not on file       Family History   Problem Relation Age of Onset   • Heart disease Mother    • Heart disease Father    • Heart disease Brother         Health Maintenance Due   Topic Date Due   • ANNUAL PHYSICAL  Never " done   • Pneumococcal Vaccine 0-64 (1 of 1 - PPSV23) Never done   • TDAP/TD VACCINES (1 - Tdap) Never done   • ZOSTER VACCINE (1 of 2) Never done   • HEPATITIS C SCREENING  Never done   • LIPID PANEL  06/07/2021          Current Outpatient Medications:   •  cetirizine (zyrTEC) 10 MG tablet, Take 1 tablet by mouth Daily., Disp: 30 tablet, Rfl: 11  •  Dulera 200-5 MCG/ACT inhaler, , Disp: , Rfl:   •  lisinopril (PRINIVIL,ZESTRIL) 40 MG tablet, Take 1 tablet by mouth Daily for 90 days., Disp: 30 tablet, Rfl: 5  •  lovastatin (MEVACOR) 20 MG tablet, Take 1 tablet by mouth Every Night., Disp: 30 tablet, Rfl: 5  •  mometasone-formoterol (Dulera) 100-5 MCG/ACT inhaler, Dulera 100-5 mcg/actuation inhalation HFA aerosol inhaler inhale 2 puffs by inhalation route 2 times per day in the morning and evening   Suspended, Disp: , Rfl:   •  montelukast (SINGULAIR) 10 MG tablet, Take 1 tablet by mouth Every Night., Disp: 30 tablet, Rfl: 5  •  pantoprazole (PROTONIX) 40 MG EC tablet, Take 1 tablet by mouth Daily., Disp: 30 tablet, Rfl: 5  •  Spiriva HandiHaler 18 MCG per inhalation capsule, , Disp: , Rfl:   •  Ventolin  (90 Base) MCG/ACT inhaler, , Disp: , Rfl:   •  albuterol (ACCUNEB) 0.63 MG/3ML nebulizer solution, albuterol sulfate 0.63 mg/3 mL inhalation solution for nebulization use in nebulizer as directed   Suspended, Disp: , Rfl:   •  fluticasone (FLONASE) 50 MCG/ACT nasal spray, fluticasone 50 mcg/actuation nasal spray,suspension spray 1 spray (50 mcg) in each nostril by intranasal route once daily   Active, Disp: , Rfl:   •  hydroCHLOROthiazide (HYDRODIURIL) 25 MG tablet, , Disp: , Rfl:   •  lovastatin (MEVACOR) 10 MG tablet, lovastatin 10 mg oral tablet take 1 tablet (10 mg) by oral route once daily with the evening meal   Suspended, Disp: , Rfl:   •  predniSONE (DELTASONE) 20 MG tablet, prednisone 20 mg oral tablet take 20 tablets by oral route QD for 5 days For emergency use only   Suspended, Disp: , Rfl:   •   simethicone (Gas-X) 80 MG chewable tablet, Chew 1 tablet Every 6 (Six) Hours As Needed for Flatulence., Disp: 30 tablet, Rfl: 2  •  spironolactone (Aldactone) 25 MG tablet, Take 1 tablet by mouth Daily., Disp: 30 tablet, Rfl: 2    Medications Discontinued During This Encounter   Medication Reason   • lisinopril (PRINIVIL,ZESTRIL) 20 MG tablet Reorder   • pantoprazole (PROTONIX) 40 MG EC tablet Reorder   • cetirizine (zyrTEC) 10 MG tablet Reorder   • montelukast (SINGULAIR) 10 MG tablet Reorder   • lovastatin (MEVACOR) 20 MG tablet Reorder   • lisinopril (PRINIVIL,ZESTRIL) 2.5 MG tablet        Immunization History   Administered Date(s) Administered   • COVID-19 (MODERNA) 03/31/2021, 04/28/2021   • Influenza, Unspecified 11/14/2019       Review of Systems   All other systems reviewed and are negative.       Objective          Vitals:    07/01/21 0927   BP: 148/90   Pulse:    Resp:    Temp:    SpO2:      Body mass index is 21.97 kg/m².         Physical Exam  Vitals reviewed.   Constitutional:       Appearance: Normal appearance. He is well-developed.   HENT:      Head: Normocephalic and atraumatic.      Right Ear: External ear normal.      Left Ear: External ear normal.      Mouth/Throat:      Pharynx: No oropharyngeal exudate.   Eyes:      Conjunctiva/sclera: Conjunctivae normal.      Pupils: Pupils are equal, round, and reactive to light.   Neck:      Thyroid: No thyroid mass, thyromegaly or thyroid tenderness.      Vascular: No carotid bruit or JVD.      Trachea: Trachea normal.   Cardiovascular:      Rate and Rhythm: Normal rate and regular rhythm.      Heart sounds: No murmur heard.   No friction rub. No gallop.    Pulmonary:      Effort: Pulmonary effort is normal.      Breath sounds: Normal breath sounds. No wheezing or rhonchi.   Abdominal:      General: Bowel sounds are normal. There is no distension.      Palpations: Abdomen is soft.      Tenderness: There is no abdominal tenderness.   Musculoskeletal:       Right lower leg: No edema.      Left lower leg: No edema.   Lymphadenopathy:      Cervical: No cervical adenopathy.   Skin:     General: Skin is warm and dry.   Neurological:      Mental Status: He is alert and oriented to person, place, and time.      Cranial Nerves: No cranial nerve deficit.   Psychiatric:         Mood and Affect: Mood and affect normal.         Behavior: Behavior normal.         Thought Content: Thought content normal.         Judgment: Judgment normal.             Result Review :     The following data was reviewed by: RAY Serrano on 07/01/2021:    Common labs    Common Labsle 7/23/20 4/23/21 4/29/21 4/29/21      0944 0944   Glucose 82 91  92   BUN 17 10  11   Creatinine 0.97 0.92  1.00   Sodium 135 134 (A)  130 (A)   Potassium 4.3 3.4 (A)  4.0   Chloride 96 (A) 92 (A)  94 (A)   Calcium 9.2 9.6  9.0   Albumin 4.5 5.0  4.6   Total Bilirubin 0.56 0.19 (A)  0.31   Alkaline Phosphatase 70 60  64   AST (SGOT) 31 29  27   ALT (SGPT) 20 25  23   WBC 5.84 7.88 9.89    Hemoglobin 13.6 (A) 13.0 (A) 13.1 (A)    Hematocrit 40.0 (A) 38.0 (A) 38.8 (A)    Platelets 368 442 (A) 441 (A)    Total Cholesterol 194 188     Triglycerides 103 119     HDL Cholesterol 66 (A) 89 (A)     LDL Cholesterol  107 (A) 75     PSA  0.70     (A) Abnormal value       Comments are available for some flowsheets but are not being displayed.             Data reviewed: Consultant notes general surgery              Assessment and Plan      Diagnoses and all orders for this visit:    1. Essential hypertension (Primary)  Assessment & Plan:  Hypertension is improving with treatment.  Continue current treatment regimen.  Continue current medications.  Medication changes per orders.  Blood pressure will be reassessed at the next regular appointment.    Orders:  -     lisinopril (PRINIVIL,ZESTRIL) 40 MG tablet; Take 1 tablet by mouth Daily for 90 days.  Dispense: 30 tablet; Refill: 5  -     Comprehensive Metabolic Panel  -      CBC & Differential  -     spironolactone (Aldactone) 25 MG tablet; Take 1 tablet by mouth Daily.  Dispense: 30 tablet; Refill: 2    2. Hyponatremia  -     Comprehensive Metabolic Panel  -     Comprehensive metabolic panel; Future    3. Mixed hyperlipidemia  Assessment & Plan:  Lipid abnormalities are improving with treatment.  Pharmacotherapy as ordered.  Lipids will be reassessed in 6 months.    Orders:  -     lovastatin (MEVACOR) 20 MG tablet; Take 1 tablet by mouth Every Night.  Dispense: 30 tablet; Refill: 5  -     Lipid Panel    4. Moderate persistent asthma, unspecified whether complicated  -     montelukast (SINGULAIR) 10 MG tablet; Take 1 tablet by mouth Every Night.  Dispense: 30 tablet; Refill: 5  -     CBC & Differential    5. Allergic rhinitis, unspecified seasonality, unspecified trigger  -     cetirizine (zyrTEC) 10 MG tablet; Take 1 tablet by mouth Daily.  Dispense: 30 tablet; Refill: 11  -     montelukast (SINGULAIR) 10 MG tablet; Take 1 tablet by mouth Every Night.  Dispense: 30 tablet; Refill: 5  -     CBC & Differential    6. Alcohol abuse  Assessment & Plan:  Psychological condition is unchanged.  declines any counseling or meetings.   Psychological condition  will be reassessed at the next regular appointment.      7. Gastroesophageal reflux disease without esophagitis  Assessment & Plan:  Controlled and stable with Protonix    Orders:  -     pantoprazole (PROTONIX) 40 MG EC tablet; Take 1 tablet by mouth Daily.  Dispense: 30 tablet; Refill: 5    8. Right inguinal hernia  Assessment & Plan:  Surgery scheduled for 8/11/2021      9. Encounter for annual physical exam  -     TSH    10. Gas bloat syndrome  -     pantoprazole (PROTONIX) 40 MG EC tablet; Take 1 tablet by mouth Daily.  Dispense: 30 tablet; Refill: 5  -     simethicone (Gas-X) 80 MG chewable tablet; Chew 1 tablet Every 6 (Six) Hours As Needed for Flatulence.  Dispense: 30 tablet; Refill: 2          Follow Up     Return in about 6 months  (around 1/1/2022) for Recheck.    Patient was given instructions and counseling regarding his condition or for health maintenance advice. Please see specific information pulled into the AVS if appropriate.     Bentley Martínez  reports that he quit smoking about 32 years ago. His smoking use included cigarettes. He has a 18.00 pack-year smoking history. He has never used smokeless tobacco.     I have reviewed patient's medicines and at this time no adjustments need to be made for chronic conditions    Will call with lab results.    He will go to have labs drawn in one month to eval electrolytes since I am putting him on spironolactone and recheck sodium level.     Juhi Addison, APRN

## 2021-07-01 NOTE — ASSESSMENT & PLAN NOTE
Psychological condition is unchanged.  declines any counseling or meetings.   Psychological condition  will be reassessed at the next regular appointment.

## 2021-07-01 NOTE — ASSESSMENT & PLAN NOTE
Hypertension is improving with treatment.  Continue current treatment regimen.  Continue current medications.  Medication changes per orders.  Blood pressure will be reassessed at the next regular appointment.

## 2021-07-04 DIAGNOSIS — I10 ESSENTIAL HYPERTENSION: ICD-10-CM

## 2021-07-06 RX ORDER — ALBUTEROL SULFATE 90 UG/1
AEROSOL, METERED RESPIRATORY (INHALATION)
Qty: 18 G | Refills: 4 | Status: SHIPPED | OUTPATIENT
Start: 2021-07-06 | End: 2022-03-22 | Stop reason: SDUPTHER

## 2021-07-06 RX ORDER — LISINOPRIL 20 MG/1
TABLET ORAL
Qty: 60 TABLET | Refills: 0 | OUTPATIENT
Start: 2021-07-06

## 2021-07-22 DIAGNOSIS — I10 ESSENTIAL HYPERTENSION: ICD-10-CM

## 2021-07-23 RX ORDER — LISINOPRIL 20 MG/1
TABLET ORAL
Qty: 60 TABLET | Refills: 0 | OUTPATIENT
Start: 2021-07-23

## 2021-07-27 RX ORDER — ROSUVASTATIN CALCIUM 20 MG/1
20 TABLET, COATED ORAL NIGHTLY
COMMUNITY
End: 2022-03-22 | Stop reason: CLARIF

## 2021-08-11 ENCOUNTER — HOSPITAL ENCOUNTER (OUTPATIENT)
Facility: HOSPITAL | Age: 56
Setting detail: HOSPITAL OUTPATIENT SURGERY
Discharge: HOME | End: 2021-08-11
Attending: SURGERY | Admitting: SURGERY

## 2021-08-11 ENCOUNTER — HOSPITAL ENCOUNTER (EMERGENCY)
Facility: HOSPITAL | Age: 56
Discharge: LEFT BEFORE DISCHARGE | End: 2021-08-11

## 2021-08-11 ENCOUNTER — ANESTHESIA (OUTPATIENT)
Dept: PERIOP | Facility: HOSPITAL | Age: 56
End: 2021-08-11

## 2021-08-11 ENCOUNTER — ANESTHESIA EVENT (OUTPATIENT)
Dept: PERIOP | Facility: HOSPITAL | Age: 56
End: 2021-08-11

## 2021-08-11 VITALS
BODY MASS INDEX: 21.89 KG/M2 | HEART RATE: 87 BPM | WEIGHT: 144.4 LBS | RESPIRATION RATE: 16 BRPM | DIASTOLIC BLOOD PRESSURE: 98 MMHG | HEIGHT: 68 IN | SYSTOLIC BLOOD PRESSURE: 189 MMHG | OXYGEN SATURATION: 96 % | TEMPERATURE: 98 F

## 2021-08-11 VITALS
WEIGHT: 137.57 LBS | BODY MASS INDEX: 20.85 KG/M2 | OXYGEN SATURATION: 95 % | TEMPERATURE: 98.6 F | HEART RATE: 70 BPM | HEIGHT: 68 IN | RESPIRATION RATE: 16 BRPM | DIASTOLIC BLOOD PRESSURE: 77 MMHG | SYSTOLIC BLOOD PRESSURE: 133 MMHG

## 2021-08-11 DIAGNOSIS — K40.90 RIGHT INGUINAL HERNIA: ICD-10-CM

## 2021-08-11 LAB — QT INTERVAL: 373 MS

## 2021-08-11 PROCEDURE — 25010000002 HYDROMORPHONE PER 4 MG: Performed by: NURSE ANESTHETIST, CERTIFIED REGISTERED

## 2021-08-11 PROCEDURE — 49650 LAP ING HERNIA REPAIR INIT: CPT | Performed by: SURGERY

## 2021-08-11 PROCEDURE — 99211 OFF/OP EST MAY X REQ PHY/QHP: CPT

## 2021-08-11 PROCEDURE — 25010000002 PROPOFOL 10 MG/ML EMULSION: Performed by: NURSE ANESTHETIST, CERTIFIED REGISTERED

## 2021-08-11 PROCEDURE — 93005 ELECTROCARDIOGRAM TRACING: CPT | Performed by: SURGERY

## 2021-08-11 PROCEDURE — C1889 IMPLANT/INSERT DEVICE, NOC: HCPCS | Performed by: SURGERY

## 2021-08-11 PROCEDURE — 25010000002 ONDANSETRON PER 1 MG: Performed by: NURSE ANESTHETIST, CERTIFIED REGISTERED

## 2021-08-11 PROCEDURE — S2900 ROBOTIC SURGICAL SYSTEM: HCPCS | Performed by: SURGERY

## 2021-08-11 PROCEDURE — 25010000002 DEXAMETHASONE PER 1 MG: Performed by: NURSE ANESTHETIST, CERTIFIED REGISTERED

## 2021-08-11 PROCEDURE — 25010000002 KETOROLAC TROMETHAMINE PER 15 MG: Performed by: NURSE ANESTHETIST, CERTIFIED REGISTERED

## 2021-08-11 PROCEDURE — 25010000002 MIDAZOLAM PER 1MG: Performed by: ANESTHESIOLOGY

## 2021-08-11 PROCEDURE — 49650 LAP ING HERNIA REPAIR INIT: CPT | Performed by: SPECIALIST/TECHNOLOGIST, OTHER

## 2021-08-11 PROCEDURE — 25010000002 FENTANYL CITRATE (PF) 50 MCG/ML SOLUTION: Performed by: NURSE ANESTHETIST, CERTIFIED REGISTERED

## 2021-08-11 PROCEDURE — 25010000003 CEFAZOLIN IN DEXTROSE 2-4 GM/100ML-% SOLUTION: Performed by: SURGERY

## 2021-08-11 PROCEDURE — C1781 MESH (IMPLANTABLE): HCPCS | Performed by: SURGERY

## 2021-08-11 DEVICE — MESH PROGRIP LAP S/FIX ABS 10X15CM BX/2: Type: IMPLANTABLE DEVICE | Site: ABDOMEN | Status: FUNCTIONAL

## 2021-08-11 DEVICE — ABSORBABLE WOUND CLOSURE DEVICE
Type: IMPLANTABLE DEVICE | Site: ABDOMEN | Status: FUNCTIONAL
Brand: V-LOC 180

## 2021-08-11 RX ORDER — BUPIVACAINE HYDROCHLORIDE AND EPINEPHRINE 2.5; 5 MG/ML; UG/ML
INJECTION, SOLUTION EPIDURAL; INFILTRATION; INTRACAUDAL; PERINEURAL AS NEEDED
Status: DISCONTINUED | OUTPATIENT
Start: 2021-08-11 | End: 2021-08-11 | Stop reason: HOSPADM

## 2021-08-11 RX ORDER — KETOROLAC TROMETHAMINE 30 MG/ML
INJECTION, SOLUTION INTRAMUSCULAR; INTRAVENOUS AS NEEDED
Status: DISCONTINUED | OUTPATIENT
Start: 2021-08-11 | End: 2021-08-11 | Stop reason: SURG

## 2021-08-11 RX ORDER — MIDAZOLAM HYDROCHLORIDE 2 MG/2ML
2 INJECTION, SOLUTION INTRAMUSCULAR; INTRAVENOUS ONCE
Status: COMPLETED | OUTPATIENT
Start: 2021-08-11 | End: 2021-08-11

## 2021-08-11 RX ORDER — CEFAZOLIN SODIUM 2 G/100ML
2 INJECTION, SOLUTION INTRAVENOUS ONCE
Status: COMPLETED | OUTPATIENT
Start: 2021-08-11 | End: 2021-08-11

## 2021-08-11 RX ORDER — METOPROLOL TARTRATE 5 MG/5ML
INJECTION INTRAVENOUS AS NEEDED
Status: DISCONTINUED | OUTPATIENT
Start: 2021-08-11 | End: 2021-08-11 | Stop reason: SURG

## 2021-08-11 RX ORDER — SODIUM CHLORIDE 0.9 % (FLUSH) 0.9 %
3 SYRINGE (ML) INJECTION EVERY 12 HOURS SCHEDULED
Status: DISCONTINUED | OUTPATIENT
Start: 2021-08-11 | End: 2021-08-11 | Stop reason: HOSPADM

## 2021-08-11 RX ORDER — SODIUM CHLORIDE, SODIUM LACTATE, POTASSIUM CHLORIDE, CALCIUM CHLORIDE 600; 310; 30; 20 MG/100ML; MG/100ML; MG/100ML; MG/100ML
70 INJECTION, SOLUTION INTRAVENOUS CONTINUOUS
Status: DISCONTINUED | OUTPATIENT
Start: 2021-08-11 | End: 2021-08-11 | Stop reason: HOSPADM

## 2021-08-11 RX ORDER — ESMOLOL HYDROCHLORIDE 10 MG/ML
INJECTION INTRAVENOUS AS NEEDED
Status: DISCONTINUED | OUTPATIENT
Start: 2021-08-11 | End: 2021-08-11 | Stop reason: SURG

## 2021-08-11 RX ORDER — OXYCODONE HYDROCHLORIDE 5 MG/1
5 TABLET ORAL
Status: DISCONTINUED | OUTPATIENT
Start: 2021-08-11 | End: 2021-08-11 | Stop reason: HOSPADM

## 2021-08-11 RX ORDER — SODIUM CHLORIDE, SODIUM LACTATE, POTASSIUM CHLORIDE, CALCIUM CHLORIDE 600; 310; 30; 20 MG/100ML; MG/100ML; MG/100ML; MG/100ML
9 INJECTION, SOLUTION INTRAVENOUS CONTINUOUS PRN
Status: DISCONTINUED | OUTPATIENT
Start: 2021-08-11 | End: 2021-08-11 | Stop reason: HOSPADM

## 2021-08-11 RX ORDER — SODIUM CHLORIDE 0.9 % (FLUSH) 0.9 %
10 SYRINGE (ML) INJECTION AS NEEDED
Status: DISCONTINUED | OUTPATIENT
Start: 2021-08-11 | End: 2021-08-11 | Stop reason: HOSPADM

## 2021-08-11 RX ORDER — SUCCINYLCHOLINE/SOD CL,ISO/PF 100 MG/5ML
SYRINGE (ML) INTRAVENOUS AS NEEDED
Status: DISCONTINUED | OUTPATIENT
Start: 2021-08-11 | End: 2021-08-11 | Stop reason: SURG

## 2021-08-11 RX ORDER — ONDANSETRON 2 MG/ML
4 INJECTION INTRAMUSCULAR; INTRAVENOUS ONCE AS NEEDED
Status: DISCONTINUED | OUTPATIENT
Start: 2021-08-11 | End: 2021-08-11 | Stop reason: HOSPADM

## 2021-08-11 RX ORDER — FENTANYL CITRATE 50 UG/ML
INJECTION, SOLUTION INTRAMUSCULAR; INTRAVENOUS AS NEEDED
Status: DISCONTINUED | OUTPATIENT
Start: 2021-08-11 | End: 2021-08-11 | Stop reason: SURG

## 2021-08-11 RX ORDER — DEXAMETHASONE SODIUM PHOSPHATE 4 MG/ML
INJECTION, SOLUTION INTRA-ARTICULAR; INTRALESIONAL; INTRAMUSCULAR; INTRAVENOUS; SOFT TISSUE AS NEEDED
Status: DISCONTINUED | OUTPATIENT
Start: 2021-08-11 | End: 2021-08-11 | Stop reason: SURG

## 2021-08-11 RX ORDER — GLYCOPYRROLATE 0.2 MG/ML
0.2 INJECTION INTRAMUSCULAR; INTRAVENOUS
Status: COMPLETED | OUTPATIENT
Start: 2021-08-11 | End: 2021-08-11

## 2021-08-11 RX ORDER — ROCURONIUM BROMIDE 10 MG/ML
INJECTION, SOLUTION INTRAVENOUS AS NEEDED
Status: DISCONTINUED | OUTPATIENT
Start: 2021-08-11 | End: 2021-08-11 | Stop reason: SURG

## 2021-08-11 RX ORDER — IBUPROFEN 600 MG/1
600 TABLET ORAL EVERY 6 HOURS PRN
Status: CANCELLED | OUTPATIENT
Start: 2021-08-11

## 2021-08-11 RX ORDER — PROMETHAZINE HYDROCHLORIDE 12.5 MG/1
25 TABLET ORAL ONCE AS NEEDED
Status: DISCONTINUED | OUTPATIENT
Start: 2021-08-11 | End: 2021-08-11 | Stop reason: HOSPADM

## 2021-08-11 RX ORDER — ACETAMINOPHEN 500 MG
1000 TABLET ORAL ONCE
Status: COMPLETED | OUTPATIENT
Start: 2021-08-11 | End: 2021-08-11

## 2021-08-11 RX ORDER — HYDROMORPHONE HCL 110MG/55ML
PATIENT CONTROLLED ANALGESIA SYRINGE INTRAVENOUS AS NEEDED
Status: DISCONTINUED | OUTPATIENT
Start: 2021-08-11 | End: 2021-08-11 | Stop reason: SURG

## 2021-08-11 RX ORDER — MEPERIDINE HYDROCHLORIDE 25 MG/ML
12.5 INJECTION INTRAMUSCULAR; INTRAVENOUS; SUBCUTANEOUS
Status: DISCONTINUED | OUTPATIENT
Start: 2021-08-11 | End: 2021-08-11 | Stop reason: HOSPADM

## 2021-08-11 RX ORDER — HYDROCODONE BITARTRATE AND ACETAMINOPHEN 5; 325 MG/1; MG/1
1-2 TABLET ORAL EVERY 4 HOURS PRN
Qty: 10 TABLET | Refills: 0 | Status: SHIPPED | OUTPATIENT
Start: 2021-08-11 | End: 2022-03-22

## 2021-08-11 RX ORDER — LIDOCAINE HYDROCHLORIDE 20 MG/ML
INJECTION, SOLUTION INFILTRATION; PERINEURAL AS NEEDED
Status: DISCONTINUED | OUTPATIENT
Start: 2021-08-11 | End: 2021-08-11 | Stop reason: SURG

## 2021-08-11 RX ORDER — HYDROCODONE BITARTRATE AND ACETAMINOPHEN 5; 325 MG/1; MG/1
1 TABLET ORAL ONCE AS NEEDED
Status: CANCELLED | OUTPATIENT
Start: 2021-08-11 | End: 2021-08-18

## 2021-08-11 RX ORDER — ONDANSETRON 2 MG/ML
INJECTION INTRAMUSCULAR; INTRAVENOUS AS NEEDED
Status: DISCONTINUED | OUTPATIENT
Start: 2021-08-11 | End: 2021-08-11 | Stop reason: SURG

## 2021-08-11 RX ORDER — ONDANSETRON 4 MG/1
4 TABLET, FILM COATED ORAL ONCE AS NEEDED
Status: DISCONTINUED | OUTPATIENT
Start: 2021-08-11 | End: 2021-08-11 | Stop reason: HOSPADM

## 2021-08-11 RX ORDER — PROPOFOL 10 MG/ML
VIAL (ML) INTRAVENOUS AS NEEDED
Status: DISCONTINUED | OUTPATIENT
Start: 2021-08-11 | End: 2021-08-11 | Stop reason: SURG

## 2021-08-11 RX ORDER — PROMETHAZINE HYDROCHLORIDE 25 MG/1
25 SUPPOSITORY RECTAL ONCE AS NEEDED
Status: DISCONTINUED | OUTPATIENT
Start: 2021-08-11 | End: 2021-08-11 | Stop reason: HOSPADM

## 2021-08-11 RX ADMIN — ESMOLOL HYDROCHLORIDE 10 MG: 100 INJECTION, SOLUTION INTRAVENOUS at 09:31

## 2021-08-11 RX ADMIN — METOPROLOL TARTRATE 5 MG: 1 INJECTION, SOLUTION INTRAVENOUS at 09:40

## 2021-08-11 RX ADMIN — CEFAZOLIN SODIUM 2 G: 2 INJECTION, SOLUTION INTRAVENOUS at 09:08

## 2021-08-11 RX ADMIN — LIDOCAINE HYDROCHLORIDE 100 MG: 20 INJECTION, SOLUTION INFILTRATION; PERINEURAL at 09:11

## 2021-08-11 RX ADMIN — HYDROMORPHONE HYDROCHLORIDE 1 MG: 2 INJECTION, SOLUTION INTRAMUSCULAR; INTRAVENOUS; SUBCUTANEOUS at 09:25

## 2021-08-11 RX ADMIN — PROPOFOL 180 MG: 10 INJECTION, EMULSION INTRAVENOUS at 09:11

## 2021-08-11 RX ADMIN — KETOROLAC TROMETHAMINE 30 MG: 30 INJECTION, SOLUTION INTRAMUSCULAR; INTRAVENOUS at 09:52

## 2021-08-11 RX ADMIN — ROCURONIUM BROMIDE 45 MG: 10 INJECTION INTRAVENOUS at 09:24

## 2021-08-11 RX ADMIN — ONDANSETRON 4 MG: 2 INJECTION INTRAMUSCULAR; INTRAVENOUS at 09:52

## 2021-08-11 RX ADMIN — SODIUM CHLORIDE, POTASSIUM CHLORIDE, SODIUM LACTATE AND CALCIUM CHLORIDE 9 ML/HR: 600; 310; 30; 20 INJECTION, SOLUTION INTRAVENOUS at 08:42

## 2021-08-11 RX ADMIN — ROCURONIUM BROMIDE 5 MG: 10 INJECTION INTRAVENOUS at 09:11

## 2021-08-11 RX ADMIN — SUGAMMADEX 200 MG: 100 INJECTION, SOLUTION INTRAVENOUS at 09:52

## 2021-08-11 RX ADMIN — ESMOLOL HYDROCHLORIDE 10 MG: 100 INJECTION, SOLUTION INTRAVENOUS at 09:15

## 2021-08-11 RX ADMIN — HYDROMORPHONE HYDROCHLORIDE 1 MG: 2 INJECTION, SOLUTION INTRAMUSCULAR; INTRAVENOUS; SUBCUTANEOUS at 09:31

## 2021-08-11 RX ADMIN — Medication 70 MG: at 09:11

## 2021-08-11 RX ADMIN — SODIUM CHLORIDE, POTASSIUM CHLORIDE, SODIUM LACTATE AND CALCIUM CHLORIDE: 600; 310; 30; 20 INJECTION, SOLUTION INTRAVENOUS at 10:00

## 2021-08-11 RX ADMIN — ACETAMINOPHEN 1000 MG: 500 TABLET ORAL at 08:40

## 2021-08-11 RX ADMIN — DEXAMETHASONE SODIUM PHOSPHATE 4 MG: 4 INJECTION INTRA-ARTICULAR; INTRALESIONAL; INTRAMUSCULAR; INTRAVENOUS; SOFT TISSUE at 09:08

## 2021-08-11 RX ADMIN — FENTANYL CITRATE 100 MCG: 50 INJECTION INTRAMUSCULAR; INTRAVENOUS at 09:09

## 2021-08-11 RX ADMIN — MIDAZOLAM HYDROCHLORIDE 2 MG: 1 INJECTION, SOLUTION INTRAMUSCULAR; INTRAVENOUS at 08:52

## 2021-08-11 RX ADMIN — GLYCOPYRROLATE 0.2 MG: 0.2 INJECTION INTRAMUSCULAR; INTRAVENOUS at 08:52

## 2021-08-11 RX ADMIN — OXYCODONE HYDROCHLORIDE 5 MG: 5 TABLET ORAL at 11:13

## 2021-08-11 NOTE — OP NOTE
INGUINAL HERNIA REPAIR LAPAROSCOPIC WITH DAVINCI ROBOT  Procedure Report    Patient Name:  Bentley Martínez  YOB: 1965     Date of Surgery:  8/11/2021     Indications: Right inguinal hernia    Pre-op Diagnosis:   Right inguinal hernia [K40.90]       Post-Op Diagnosis Codes:     * Right inguinal hernia [K40.90]    Procedure/CPT® Codes:      Procedure(s):  INGUINAL HERNIA REPAIR LAPAROSCOPIC WITH DAVINCI ROBOT    Staff:  Surgeon(s):  Orlando Leon MD    Assistant: Antonietta Jaramillo CSA    Anesthesia: General    Estimated Blood Loss: minimal    Implants:    Implant Name Type Inv. Item Serial No.  Lot No. LRB No. Used Action   DEV CLS WND VLOC/180 DARWIN ABS 1/2CIR SZ2/0 15CM 27MM GRN - [id number] Implant DEV CLS WND VLOC/180 DARWIN ABS 1/2CIR SZ2/0 15CM 27MM GRN  [employer]OVID[employer] [id number] Right 1 Implanted   MESH PROGRIP LAP S/FIX ABS [id number] BX/2 - [id number] Implant MESH PROGRIP LAP S/FIX ABS [id number] BX/2  COVID[employer]EN [id number] Right 1 Implanted       Specimen:          None        Findings: Indirect right inguinal hernia    Complications: None    Description of Procedure: The patient was taken to the operating room and placed on the table in supine position.  After induction of general endotracheal anesthesia, the abdomen was prepped and draped sterilely.  The abdomen was insufflated with a Veress needle and a 12 mm left upper quadrant assist port was placed into the peritoneal cavity using a Visiport.  Three 8 mm da Rafiq robotic trochars were then placed into the peritoneum above the umbilicus under direct vision.  The da Rafiq robot was then brought up to the table and the robotic arms were docked to the trochars.  The camera and instruments were then placed into the peritoneal cavity under direct vision.  There was an indirect right inguinal hernia visible with no evidence of a left inguinal hernia.  The peritoneum was incised cephalad to the right inguinal canal using cautery scissors and the  peritoneum was dissected down from the anterior abdominal wall.  Dissection was carried down into the right inguinal canal.  I dissected medially and identified Don's ligament.  I then dissected over the peritoneal flap laterally and created a large pocket for mesh placement.  A small indirect inguinal hernia sac was then identified.  The sac was grasped and then carefully dissected free from the cord structures using a combination of blunt and sharp dissection.  The sac was completely dissected free and reduced back into the peritoneum.  I then dissected the peritoneum back further posteriorly off of the vessels to facilitate good mesh positioning.  A 10 x 15 cm piece of ProGrip hernia mesh was then placed into a preperitoneal position.  Once the mesh was fixated in position we appear to have good coverage of the direct and indirect hernia spaces.  We had good hemostasis.  The peritoneum was then reclosed with a running 2-0 absorbable V-Loc suture.  The bowel in the lower abdomen looked fine.  The robotic instruments were removed from the abdomen and the robotic arms were undocked from the trochars.  The left upper quadrant port site fascial defect was then closed with a Musa Tran closure device and a 0 Mersilene tie.  The abdomen was desufflated and the other ports were removed.  All skin incisions were closed with 4-0 Vicryl subcuticular sutures and sterile dressings were applied.  The patient was taken the postanesthesia recovery room in stable condition    Assistant: Antonietta Jaramillo CSA  was responsible for performing the following activities: Retraction, Suturing, Placing Dressing and Held/Positioned Camera and their skilled assistance was necessary for the success of this case.    Orlando Leon MD     Date: 8/11/2021  Time: 09:55 EDT

## 2021-08-11 NOTE — ANESTHESIA PREPROCEDURE EVALUATION
Anesthesia Evaluation     Patient summary reviewed and Nursing notes reviewed   NPO Solid Status: > 8 hours  NPO Liquid Status: > 8 hours           Airway   Mallampati: I  TM distance: >3 FB  Dental      Pulmonary - normal exam   (+) asthma,  Cardiovascular - normal exam  Exercise tolerance: good (4-7 METS)    (+) hypertension, hyperlipidemia,       Neuro/Psych  GI/Hepatic/Renal/Endo    (+)  GERD,      Musculoskeletal     Abdominal    Substance History      OB/GYN          Other                        Anesthesia Plan    ASA 2     general     intravenous induction     Anesthetic plan, all risks, benefits, and alternatives have been provided, discussed and informed consent has been obtained with: patient.

## 2021-08-11 NOTE — DISCHARGE INSTRUCTIONS
DISCHARGE INSTRUCTIONS  HERNIA      ? For your surgery you had:  ? General anesthesia (you may have a sore throat for the first 24 hours)  ? IV sedation.  ? Local anesthesia  ? Monitored anesthesia care  ? You received a medicated patch for nausea prevention today (behind your ear). It is recommended that you remove it 24-48 hours post-operatively. It must be removed within 72 hours.   ? You received an anesthesia medication today that can cause hormonal forms of birth control to be ineffective. You should use a different form of birth control (to prevent pregnancy) for 7 days.  ? You may experience dizziness, drowsiness, or light-headedness for several hours following surgery/procedure.  ? Do not stay alone today or tonight.  ? Limit your activity for 24 hours.  ? Resume your diet slowly.  Follow whatever special dietary instructions you may have been given by your doctor.  ? You should not drive, operate machinery, drink alcohol, or sign legally binding documents for 24 hours or while you are taking pain medication.  Last dose of pain medication was given at:   .  NOTIFY YOUR DOCTOR IF YOU EXPERIENCE ANY OF THE FOLLOWING:  ? Temperature greater than 101 degrees Fahrenheit  ? Shaking Chills  ? Redness or excessive drainage from incision  ? Nausea, vomiting and/or pain that is not controlled by prescribed medications  ? Increase in bleeding or bleeding that is excessive  ? Unable to urinate in 6 hours after surgery  ? If unable to reach your doctor, please go to the closest Emergency Room [] You may remove dressing:   [] in 24 hours   [] in 48 hours   [] Other:    [] You may shower or bathe:    ? Apply an ice pack for 24-48 hours.  [] Wear a jockey support or tight fitting briefs to prevent  swelling.  ? Do not do any heavy lifting, pushing or pulling.  ? You may walk up and down stairs.  ? You may ride in a car but do not drive until instructed by your physician.  ? Avoid constipation.  ? If unable to urinate in 6  to 8 hours after surgery or urinating frequently in small amounts, notify your doctor or go to the nearest Emergency Room.  ? Medications per physician instructions as indicated on Discharge Medication Information Sheet.  You should see   for follow-up care   on  .  Phone number:       SPECIAL INSTRUCTIONS:                 I have read and received the above instructions.     Patient/Responsible Party's Signature Date/Time     RN Signature Date/Time

## 2021-08-11 NOTE — ANESTHESIA POSTPROCEDURE EVALUATION
Patient: Bentley Martínez    Procedure Summary     Date: 08/11/21 Room / Location: [facility] OR 08 / [facility] MAIN OR    Anesthesia Start: 0908 Anesthesia Stop: 1004    Procedure: INGUINAL HERNIA REPAIR LAPAROSCOPIC WITH DAVINCI ROBOT (Right Abdomen) Diagnosis:       Right inguinal hernia      (Right inguinal hernia [K40.90])    Surgeons: Orlando Leon MD Provider: Aldair Abreu MD    Anesthesia Type: general ASA Status: 2          Anesthesia Type: general    Vitals  Vitals Value Taken Time   /91 08/11/21 1022   Temp 36.3 °C (97.3 °F) 08/11/21 1007   Pulse 77 08/11/21 1023   Resp 12 08/11/21 1007   SpO2 96 % 08/11/21 1023   Vitals shown include unvalidated device data.        Post Anesthesia Care and Evaluation    Patient location during evaluation: bedside  Patient participation: complete - patient participated  Level of consciousness: awake  Pain management: adequate  Airway patency: patent  Anesthetic complications: No anesthetic complications  PONV Status: none  Cardiovascular status: acceptable and stable  Respiratory status: acceptable  Hydration status: acceptable    Comments: An Anesthesiologist personally participated in the most demanding procedures (including induction and emergence if applicable) in the anesthesia plan, monitored the course of anesthesia administration at frequent intervals and remained physically present and available for immediate diagnosis and treatment of emergencies.

## 2021-08-11 NOTE — H&P
"[facility]   HISTORY AND PHYSICAL    Patient Name: Bentley Martínez  : 1965  MRN: [id number]  Primary Care Physician:  Juhi Goins APRN  Date of admission: 2021    Subjective   Subjective     Chief Complaint: RIH    HPI:    Bentley Martínez is a 56 y.o. male who presents with a symptomatic RIH.    Review of Systems   Respiratory: Negative for shortness of breath.    Cardiovascular: Negative for chest pain.   Gastrointestinal: Negative for abdominal pain.       Personal History     Past Medical History:   Diagnosis Date   • Abdominal pain    • Alcohol abuse 2021   • Allergic rhinitis due to allergen 10/24/2018   • Asthma    • Black stool    • C. difficile diarrhea    • Essential hypertension 10/24/2018   • GERD (gastroesophageal reflux disease) 10/24/2018   • High cholesterol    • History of urinary retention     post op   • Hyperlipidemia 10/24/2018   • Hypertension    • Inguinal hernia    • Laryngeal candidiasis    • Voice hoarseness        Past Surgical History:   Procedure Laterality Date   • CYST REMOVAL      Back of neck   • HERNIA REPAIR      \"30 years ago\"       Family History: family history includes Heart disease in his brother, father, and mother. Otherwise pertinent FHx was reviewed and not pertinent to current issue.    Social History:  reports that he quit smoking about 32 years ago. His smoking use included cigarettes. He has a 18.00 pack-year smoking history. He has never used smokeless tobacco. He reports current alcohol use of about 6.0 - 8.0 standard drinks of alcohol per week. He reports current drug use. Drug: Marijuana.    Home Medications:  albuterol sulfate HFA, cetirizine, lisinopril, mometasone-formoterol, pantoprazole, rosuvastatin, simethicone, and tiotropium    Allergies:  No Known Allergies    Objective    Objective     Vitals:   Temp:  [98.7 °F (37.1 °C)] 98.7 °F (37.1 °C)  Heart Rate:  [64] 64  Resp:  [16] 16  BP: (152)/(94) 152/94    Physical Exam  HENT: "      Head: Normocephalic.   Cardiovascular:      Rate and Rhythm: Normal rate.   Pulmonary:      Effort: Pulmonary effort is normal.   Abdominal:      Palpations: Abdomen is soft.      Hernia: A hernia is present. Hernia is present in the right inguinal area.   Musculoskeletal:         General: Normal range of motion.      Cervical back: Normal range of motion.   Skin:     General: Skin is warm.   Neurological:      General: No focal deficit present.      Mental Status: He is alert.         Result Review    Result Review:  I have personally reviewed the results from the time of this admission to 8/11/2021 07:10 EDT and agree with these findings:  []  Laboratory  []  Microbiology  []  Radiology  []  EKG/Telemetry   []  Cardiology/Vascular   []  Pathology  []  Old records  []  Other:  Most notable findings include:     Assessment/Plan   Assessment / Plan     Brief Patient Summary:  Bentley Martínez is a 56 y.o. male who presents with a symptomatic right inguinal hernia    Active Hospital Problems:  Active Hospital Problems    Diagnosis    • **Right inguinal hernia        Plan:   Robotic RIH repair.  R/B/A's explained.    DVT prophylaxis:  No DVT prophylaxis order currently exists.    CODE STATUS:         Admission Status:  I believe this patient meets  status.    Electronically signed by Orlando Leon MD, 08/11/21, 7:10 AM EDT.

## 2021-08-24 ENCOUNTER — OFFICE VISIT (OUTPATIENT)
Dept: SURGERY | Facility: CLINIC | Age: 56
End: 2021-08-24

## 2021-08-24 VITALS — WEIGHT: 140.4 LBS | RESPIRATION RATE: 14 BRPM | HEIGHT: 68 IN | BODY MASS INDEX: 21.28 KG/M2

## 2021-08-24 DIAGNOSIS — K40.90 RIGHT INGUINAL HERNIA: Primary | ICD-10-CM

## 2021-08-24 PROCEDURE — 99024 POSTOP FOLLOW-UP VISIT: CPT | Performed by: SURGERY

## 2021-08-24 RX ORDER — LOVASTATIN 20 MG/1
TABLET ORAL
COMMUNITY
Start: 2021-08-16 | End: 2022-03-22 | Stop reason: SDUPTHER

## 2021-08-24 RX ORDER — LISINOPRIL 40 MG/1
TABLET ORAL
COMMUNITY
Start: 2021-07-01 | End: 2022-03-22 | Stop reason: SDUPTHER

## 2021-08-24 RX ORDER — MONTELUKAST SODIUM 10 MG/1
TABLET ORAL
COMMUNITY
Start: 2021-08-18 | End: 2022-03-22 | Stop reason: SDUPTHER

## 2021-08-24 RX ORDER — SPIRONOLACTONE 25 MG/1
TABLET ORAL
COMMUNITY
Start: 2021-08-18 | End: 2022-03-22 | Stop reason: SDUPTHER

## 2021-08-24 NOTE — PROGRESS NOTES
Chief Complaint  Post-op Follow-up (hernia)    Subjective          Bentley Martínez presents to [facility]  History of Present Illness    Bentley Martínez is a 56 y.o. male  who presents today for a postoperative visit.     Patient is here for a follow-up after a robotic right inguinal hernia repair.  He is doing okay but is having a little bit of discomfort in that right testicle.  It was swollen right after the procedure but is not as swollen now.    Past History:  Medical History: has a past medical history of Abdominal pain, Alcohol abuse (01/28/2021), Allergic rhinitis due to allergen (10/24/2018), Asthma, Black stool, C. difficile diarrhea, Essential hypertension (10/24/2018), GERD (gastroesophageal reflux disease) (10/24/2018), High cholesterol, History of urinary retention, Hyperlipidemia (10/24/2018), Hypertension, Inguinal hernia, Laryngeal candidiasis, and Voice hoarseness.   Surgical History: has a past surgical history that includes Cyst Removal; Hernia repair; and Inguinal Hernia Repair (Right, 8/11/2021).   Family History: family history includes Heart disease in his brother, father, and mother.   Social History: reports that he quit smoking about 32 years ago. His smoking use included cigarettes. He has a 18.00 pack-year smoking history. He has never used smokeless tobacco. He reports current alcohol use of about 6.0 - 8.0 standard drinks of alcohol per week. He reports current drug use. Drug: Marijuana.  Allergies: Patient has no known allergies.       Current Outpatient Medications:   •  albuterol sulfate  (90 Base) MCG/ACT inhaler, INHALE 2 PUFFS BY MOUTH EVERY 4-6 HOURS AS NEEDED FOR DYSPNEA (Patient taking differently: Inhale 2 puffs Every 4 (Four) Hours As Needed.), Disp: 18 g, Rfl: 4  •  cetirizine (zyrTEC) 10 MG tablet, Take 1 tablet by mouth Daily., Disp: 30 tablet, Rfl: 11  •  Dulera 200-5 MCG/ACT inhaler, Inhale 2 puffs 2 (Two) Times a Day., Disp: ,  "Rfl:   •  lisinopril (PRINIVIL,ZESTRIL) 40 MG tablet, , Disp: , Rfl:   •  lovastatin (MEVACOR) 20 MG tablet, , Disp: , Rfl:   •  montelukast (SINGULAIR) 10 MG tablet, , Disp: , Rfl:   •  pantoprazole (PROTONIX) 40 MG EC tablet, Take 1 tablet by mouth Daily., Disp: 30 tablet, Rfl: 5  •  simethicone (Gas-X) 80 MG chewable tablet, Chew 1 tablet Every 6 (Six) Hours As Needed for Flatulence., Disp: 30 tablet, Rfl: 2  •  Spiriva HandiHaler 18 MCG per inhalation capsule, Place 1 capsule into inhaler and inhale Daily., Disp: , Rfl:   •  HYDROcodone-acetaminophen (NORCO) 5-325 MG per tablet, Take 1-2 tablets by mouth Every 4 (Four) Hours As Needed (Pain)., Disp: 10 tablet, Rfl: 0  •  lisinopril (PRINIVIL,ZESTRIL) 40 MG tablet, Take 1 tablet by mouth Daily for 90 days., Disp: 30 tablet, Rfl: 5  •  rosuvastatin (CRESTOR) 20 MG tablet, Take 20 mg by mouth Every Night., Disp: , Rfl:   •  spironolactone (ALDACTONE) 25 MG tablet, , Disp: , Rfl:        Physical Exam  The incisions all look okay with no evidence of infection.  The right testicle is not terribly swollen  Objective     Vital Signs:   Resp 14   Ht 172.7 cm (68\")   Wt 63.7 kg (140 lb 6.4 oz)   BMI 21.35 kg/m²              Assessment and Plan    Diagnoses and all orders for this visit:    1. Right inguinal hernia (Primary)    We will let him go back to work on light duty for 2 weeks.  I will see him back on an as-needed basis.      "

## 2021-09-13 ENCOUNTER — LAB (OUTPATIENT)
Dept: LAB | Facility: HOSPITAL | Age: 56
End: 2021-09-13

## 2021-09-13 DIAGNOSIS — E87.1 HYPONATREMIA: ICD-10-CM

## 2021-09-13 PROCEDURE — 80053 COMPREHEN METABOLIC PANEL: CPT

## 2021-09-13 PROCEDURE — 36415 COLL VENOUS BLD VENIPUNCTURE: CPT

## 2021-09-14 LAB
ALBUMIN SERPL-MCNC: 4.7 G/DL (ref 3.5–5.2)
ALBUMIN/GLOB SERPL: 1.8 G/DL
ALP SERPL-CCNC: 72 U/L (ref 39–117)
ALT SERPL W P-5'-P-CCNC: 23 U/L (ref 1–41)
ANION GAP SERPL CALCULATED.3IONS-SCNC: 12.9 MMOL/L (ref 5–15)
AST SERPL-CCNC: 31 U/L (ref 1–40)
BILIRUB SERPL-MCNC: 0.3 MG/DL (ref 0–1.2)
BUN SERPL-MCNC: 9 MG/DL (ref 6–20)
BUN/CREAT SERPL: 9.2 (ref 7–25)
CALCIUM SPEC-SCNC: 9.1 MG/DL (ref 8.6–10.5)
CHLORIDE SERPL-SCNC: 102 MMOL/L (ref 98–107)
CO2 SERPL-SCNC: 24.1 MMOL/L (ref 22–29)
CREAT SERPL-MCNC: 0.98 MG/DL (ref 0.76–1.27)
GFR SERPL CREATININE-BSD FRML MDRD: 79 ML/MIN/1.73
GLOBULIN UR ELPH-MCNC: 2.6 GM/DL
GLUCOSE SERPL-MCNC: 82 MG/DL (ref 65–99)
POTASSIUM SERPL-SCNC: 4.2 MMOL/L (ref 3.5–5.2)
PROT SERPL-MCNC: 7.3 G/DL (ref 6–8.5)
SODIUM SERPL-SCNC: 139 MMOL/L (ref 136–145)

## 2021-09-17 RX ORDER — SULFAMETHOXAZOLE AND TRIMETHOPRIM 800; 160 MG/1; MG/1
1 TABLET ORAL 2 TIMES DAILY
Qty: 20 TABLET | Refills: 0 | Status: SHIPPED | OUTPATIENT
Start: 2021-09-17 | End: 2021-09-27

## 2021-09-20 ENCOUNTER — OFFICE VISIT (OUTPATIENT)
Dept: SURGERY | Facility: CLINIC | Age: 56
End: 2021-09-20

## 2021-09-20 VITALS — OXYGEN SATURATION: 99 % | BODY MASS INDEX: 21.31 KG/M2 | HEIGHT: 68 IN | HEART RATE: 82 BPM | WEIGHT: 140.6 LBS

## 2021-09-20 DIAGNOSIS — Z98.890 STATUS POST INGUINAL HERNIA REPAIR: Primary | ICD-10-CM

## 2021-09-20 DIAGNOSIS — Z87.19 STATUS POST INGUINAL HERNIA REPAIR: Primary | ICD-10-CM

## 2021-09-20 PROCEDURE — 99024 POSTOP FOLLOW-UP VISIT: CPT | Performed by: NURSE PRACTITIONER

## 2021-09-20 NOTE — PROGRESS NOTES
"Chief Complaint:   Post-op Follow-up (incison's infected from hernia repair)    Subjective      Follow-up visit         History of Present Illness  Bentley Martínez is a 56 y.o. male presents to [facility] for postop concerns.    Patient presents today with complaints of surgical incisions with erythema.    She underwent a robotic inguinal hernia repair on 8/11/2021 performed by Dr. Leon.    Patient was started on Bactrim over the weekend and reports that the erythema around his incisions have improved greatly.    Admits to tolerating his diet well with no nausea.    Having bowel movements without difficulty.    Objective     Past Medical History:   Diagnosis Date   • Abdominal pain    • Alcohol abuse 01/28/2021   • Allergic rhinitis due to allergen 10/24/2018   • Asthma    • Black stool    • C. difficile diarrhea    • Essential hypertension 10/24/2018   • GERD (gastroesophageal reflux disease) 10/24/2018   • High cholesterol    • History of urinary retention     post op   • Hyperlipidemia 10/24/2018   • Hypertension    • Inguinal hernia    • Laryngeal candidiasis    • Voice hoarseness        Past Surgical History:   Procedure Laterality Date   • CYST REMOVAL      Back of neck   • HERNIA REPAIR      \"30 years ago\"   • INGUINAL HERNIA REPAIR Right 8/11/2021    Procedure: INGUINAL HERNIA REPAIR LAPAROSCOPIC WITH VenitiINCI ROBOT;  Surgeon: Orlando Leon MD;  Location: [facility];  Service: Robotics - DaVinci;  Laterality: Right;         Current Outpatient Medications:   •  albuterol sulfate  (90 Base) MCG/ACT inhaler, INHALE 2 PUFFS BY MOUTH EVERY 4-6 HOURS AS NEEDED FOR DYSPNEA (Patient taking differently: Inhale 2 puffs Every 4 (Four) Hours As Needed.), Disp: 18 g, Rfl: 4  •  cetirizine (zyrTEC) 10 MG tablet, Take 1 tablet by mouth Daily., Disp: 30 tablet, Rfl: 11  •  Dulera 200-5 MCG/ACT inhaler, Inhale 2 puffs 2 (Two) Times a Day., Disp: , Rfl:   •  lisinopril " (PRINIVIL,ZESTRIL) 40 MG tablet, Take 1 tablet by mouth Daily for 90 days., Disp: 30 tablet, Rfl: 5  •  lisinopril (PRINIVIL,ZESTRIL) 40 MG tablet, , Disp: , Rfl:   •  lovastatin (MEVACOR) 20 MG tablet, , Disp: , Rfl:   •  montelukast (SINGULAIR) 10 MG tablet, , Disp: , Rfl:   •  pantoprazole (PROTONIX) 40 MG EC tablet, Take 1 tablet by mouth Daily., Disp: 30 tablet, Rfl: 5  •  rosuvastatin (CRESTOR) 20 MG tablet, Take 20 mg by mouth Every Night., Disp: , Rfl:   •  simethicone (Gas-X) 80 MG chewable tablet, Chew 1 tablet Every 6 (Six) Hours As Needed for Flatulence., Disp: 30 tablet, Rfl: 2  •  Spiriva HandiHaler 18 MCG per inhalation capsule, Place 1 capsule into inhaler and inhale Daily., Disp: , Rfl:   •  spironolactone (ALDACTONE) 25 MG tablet, , Disp: , Rfl:   •  sulfamethoxazole-trimethoprim (Bactrim DS) 800-160 MG per tablet, Take 1 tablet by mouth 2 (Two) Times a Day for 10 days., Disp: 20 tablet, Rfl: 0  •  HYDROcodone-acetaminophen (NORCO) 5-325 MG per tablet, Take 1-2 tablets by mouth Every 4 (Four) Hours As Needed (Pain)., Disp: 10 tablet, Rfl: 0    No Known Allergies     Family History   Problem Relation Age of Onset   • Heart disease Mother    • Heart disease Father    • Heart disease Brother        Social History     Socioeconomic History   • Marital status: Single     Spouse name: Not on file   • Number of children: Not on file   • Years of education: Not on file   • Highest education level: Not on file   Tobacco Use   • Smoking status: Former Smoker     Packs/day: 1.50     Years: 12.00     Pack years: 18.00     Types: Cigarettes     Quit date:      Years since quittin.7   • Smokeless tobacco: Never Used   Vaping Use   • Vaping Use: Never used   Substance and Sexual Activity   • Alcohol use: Yes     Alcohol/week: 6.0 - 8.0 standard drinks     Types: 6 - 8 Cans of beer per week     Comment: Current every day   • Drug use: Yes     Types: Marijuana     Comment: USED TWO DAYS AGO   • Sexual  "activity: Defer       Review of Systems     Vital Signs:   Pulse 82   Ht 172.7 cm (68\")   Wt 63.8 kg (140 lb 9.6 oz)   SpO2 99%   BMI 21.38 kg/m²      Physical Exam  Constitutional:       Appearance: Normal appearance.   Cardiovascular:      Rate and Rhythm: Normal rate.   Pulmonary:      Effort: Pulmonary effort is normal.   Abdominal:      General: Abdomen is flat.      Palpations: Abdomen is soft.      Comments: All surgical incisions are clean dry and intact with mild erythema.    Abdomen is soft and nondistended.    Mild tenderness noted around surgical incision as expected.   Skin:     General: Skin is warm and dry.   Neurological:      General: No focal deficit present.      Mental Status: He is alert and oriented to person, place, and time.   Psychiatric:         Mood and Affect: Mood normal.         Behavior: Behavior normal.          Result Review :              []  Laboratory  []  Radiology  [x]  Pathology  []  Microbiology  []  EKG/Telemetry   []  Cardiology/Vascular   [x]  Old records       Assessment and Plan    Diagnoses and all orders for this visit:    1. Status post inguinal hernia repair (Primary)        Follow Up   Return if symptoms worsen or fail to improve.       Patient was given instructions and counseling regarding his condition or for health maintenance advice. Please see specific information pulled into the AVS if appropriate.           "

## 2022-03-22 ENCOUNTER — OFFICE VISIT (OUTPATIENT)
Dept: FAMILY MEDICINE CLINIC | Facility: CLINIC | Age: 57
End: 2022-03-22

## 2022-03-22 VITALS
WEIGHT: 147.5 LBS | DIASTOLIC BLOOD PRESSURE: 80 MMHG | RESPIRATION RATE: 18 BRPM | HEART RATE: 74 BPM | SYSTOLIC BLOOD PRESSURE: 142 MMHG | BODY MASS INDEX: 22.35 KG/M2 | HEIGHT: 68 IN | OXYGEN SATURATION: 96 % | TEMPERATURE: 98.2 F

## 2022-03-22 DIAGNOSIS — R53.83 FATIGUE, UNSPECIFIED TYPE: ICD-10-CM

## 2022-03-22 DIAGNOSIS — R79.89 ELEVATED SERUM CREATININE: ICD-10-CM

## 2022-03-22 DIAGNOSIS — F10.10 ALCOHOL ABUSE: ICD-10-CM

## 2022-03-22 DIAGNOSIS — Z11.59 NEED FOR HEPATITIS C SCREENING TEST: ICD-10-CM

## 2022-03-22 DIAGNOSIS — J30.9 ALLERGIC RHINITIS, UNSPECIFIED SEASONALITY, UNSPECIFIED TRIGGER: ICD-10-CM

## 2022-03-22 DIAGNOSIS — Z23 NEED FOR INFLUENZA VACCINATION: ICD-10-CM

## 2022-03-22 DIAGNOSIS — J45.20 MILD INTERMITTENT ASTHMA WITHOUT COMPLICATION: ICD-10-CM

## 2022-03-22 DIAGNOSIS — Z12.5 SCREENING FOR PROSTATE CANCER: ICD-10-CM

## 2022-03-22 DIAGNOSIS — I10 ESSENTIAL HYPERTENSION: Primary | ICD-10-CM

## 2022-03-22 DIAGNOSIS — E78.2 MIXED HYPERLIPIDEMIA: ICD-10-CM

## 2022-03-22 DIAGNOSIS — K92.89 GAS BLOAT SYNDROME: ICD-10-CM

## 2022-03-22 DIAGNOSIS — Z23 NEED FOR PNEUMOCOCCAL VACCINATION: ICD-10-CM

## 2022-03-22 DIAGNOSIS — R73.09 ELEVATED GLUCOSE: ICD-10-CM

## 2022-03-22 DIAGNOSIS — K21.9 GASTROESOPHAGEAL REFLUX DISEASE WITHOUT ESOPHAGITIS: ICD-10-CM

## 2022-03-22 LAB
ALBUMIN SERPL-MCNC: 4.7 G/DL (ref 3.5–5.2)
ALBUMIN/GLOB SERPL: 2.2 G/DL
ALP SERPL-CCNC: 66 U/L (ref 39–117)
ALT SERPL W P-5'-P-CCNC: 22 U/L (ref 1–41)
ANION GAP SERPL CALCULATED.3IONS-SCNC: 16.4 MMOL/L (ref 5–15)
AST SERPL-CCNC: 24 U/L (ref 1–40)
BASOPHILS # BLD AUTO: 0.06 10*3/MM3 (ref 0–0.2)
BASOPHILS NFR BLD AUTO: 1 % (ref 0–1.5)
BILIRUB SERPL-MCNC: 0.2 MG/DL (ref 0–1.2)
BUN SERPL-MCNC: 19 MG/DL (ref 6–20)
BUN/CREAT SERPL: 13.6 (ref 7–25)
CALCIUM SPEC-SCNC: 9.2 MG/DL (ref 8.6–10.5)
CHLORIDE SERPL-SCNC: 103 MMOL/L (ref 98–107)
CHOLEST SERPL-MCNC: 198 MG/DL (ref 0–200)
CO2 SERPL-SCNC: 21.6 MMOL/L (ref 22–29)
CREAT SERPL-MCNC: 1.4 MG/DL (ref 0.76–1.27)
DEPRECATED RDW RBC AUTO: 43.1 FL (ref 37–54)
EGFRCR SERPLBLD CKD-EPI 2021: 58.6 ML/MIN/1.73
EOSINOPHIL # BLD AUTO: 0.45 10*3/MM3 (ref 0–0.4)
EOSINOPHIL NFR BLD AUTO: 7.1 % (ref 0.3–6.2)
ERYTHROCYTE [DISTWIDTH] IN BLOOD BY AUTOMATED COUNT: 12.7 % (ref 12.3–15.4)
GLOBULIN UR ELPH-MCNC: 2.1 GM/DL
GLUCOSE SERPL-MCNC: 116 MG/DL (ref 65–99)
HCT VFR BLD AUTO: 38.1 % (ref 37.5–51)
HCV AB SER DONR QL: NORMAL
HDLC SERPL-MCNC: 76 MG/DL (ref 40–60)
HGB BLD-MCNC: 13 G/DL (ref 13–17.7)
IMM GRANULOCYTES # BLD AUTO: 0.02 10*3/MM3 (ref 0–0.05)
IMM GRANULOCYTES NFR BLD AUTO: 0.3 % (ref 0–0.5)
LDLC SERPL CALC-MCNC: 89 MG/DL (ref 0–100)
LDLC/HDLC SERPL: 1.08 {RATIO}
LYMPHOCYTES # BLD AUTO: 1.84 10*3/MM3 (ref 0.7–3.1)
LYMPHOCYTES NFR BLD AUTO: 29.2 % (ref 19.6–45.3)
MCH RBC QN AUTO: 31.7 PG (ref 26.6–33)
MCHC RBC AUTO-ENTMCNC: 34.1 G/DL (ref 31.5–35.7)
MCV RBC AUTO: 92.9 FL (ref 79–97)
MONOCYTES # BLD AUTO: 0.64 10*3/MM3 (ref 0.1–0.9)
MONOCYTES NFR BLD AUTO: 10.1 % (ref 5–12)
NEUTROPHILS NFR BLD AUTO: 3.3 10*3/MM3 (ref 1.7–7)
NEUTROPHILS NFR BLD AUTO: 52.3 % (ref 42.7–76)
NRBC BLD AUTO-RTO: 0 /100 WBC (ref 0–0.2)
PLATELET # BLD AUTO: 384 10*3/MM3 (ref 140–450)
PMV BLD AUTO: 9.8 FL (ref 6–12)
POTASSIUM SERPL-SCNC: 4.3 MMOL/L (ref 3.5–5.2)
PROT SERPL-MCNC: 6.8 G/DL (ref 6–8.5)
PSA SERPL-MCNC: 1.16 NG/ML (ref 0–4)
RBC # BLD AUTO: 4.1 10*6/MM3 (ref 4.14–5.8)
SODIUM SERPL-SCNC: 141 MMOL/L (ref 136–145)
TRIGL SERPL-MCNC: 198 MG/DL (ref 0–150)
TSH SERPL DL<=0.05 MIU/L-ACNC: 1.92 UIU/ML (ref 0.27–4.2)
VLDLC SERPL-MCNC: 33 MG/DL (ref 5–40)
WBC NRBC COR # BLD: 6.31 10*3/MM3 (ref 3.4–10.8)

## 2022-03-22 PROCEDURE — G0103 PSA SCREENING: HCPCS | Performed by: NURSE PRACTITIONER

## 2022-03-22 PROCEDURE — 80050 GENERAL HEALTH PANEL: CPT | Performed by: NURSE PRACTITIONER

## 2022-03-22 PROCEDURE — 80061 LIPID PANEL: CPT | Performed by: NURSE PRACTITIONER

## 2022-03-22 PROCEDURE — 99214 OFFICE O/P EST MOD 30 MIN: CPT | Performed by: NURSE PRACTITIONER

## 2022-03-22 PROCEDURE — 90471 IMMUNIZATION ADMIN: CPT | Performed by: NURSE PRACTITIONER

## 2022-03-22 PROCEDURE — 86803 HEPATITIS C AB TEST: CPT | Performed by: NURSE PRACTITIONER

## 2022-03-22 PROCEDURE — 90686 IIV4 VACC NO PRSV 0.5 ML IM: CPT | Performed by: NURSE PRACTITIONER

## 2022-03-22 RX ORDER — ALBUTEROL SULFATE 90 UG/1
2 AEROSOL, METERED RESPIRATORY (INHALATION) EVERY 4 HOURS PRN
Qty: 8 G | Refills: 5 | Status: SHIPPED | OUTPATIENT
Start: 2022-03-22 | End: 2022-10-18 | Stop reason: SDUPTHER

## 2022-03-22 RX ORDER — ROSUVASTATIN CALCIUM 20 MG/1
20 TABLET, COATED ORAL NIGHTLY
Status: CANCELLED | OUTPATIENT
Start: 2022-03-22

## 2022-03-22 RX ORDER — LISINOPRIL 40 MG/1
40 TABLET ORAL DAILY
Qty: 30 TABLET | Refills: 5 | Status: SHIPPED | OUTPATIENT
Start: 2022-03-22 | End: 2022-10-18 | Stop reason: SDUPTHER

## 2022-03-22 RX ORDER — LISINOPRIL 40 MG/1
TABLET ORAL
Status: CANCELLED | OUTPATIENT
Start: 2022-03-22

## 2022-03-22 RX ORDER — TIOTROPIUM BROMIDE 18 UG/1
1 CAPSULE ORAL; RESPIRATORY (INHALATION)
Qty: 90 CAPSULE | Refills: 1 | Status: SHIPPED | OUTPATIENT
Start: 2022-03-22 | End: 2022-10-18 | Stop reason: SDUPTHER

## 2022-03-22 RX ORDER — SPIRONOLACTONE 25 MG/1
25 TABLET ORAL DAILY
Qty: 90 TABLET | Refills: 1 | Status: SHIPPED | OUTPATIENT
Start: 2022-03-22 | End: 2022-10-18 | Stop reason: SDUPTHER

## 2022-03-22 RX ORDER — MONTELUKAST SODIUM 10 MG/1
10 TABLET ORAL NIGHTLY
Qty: 90 TABLET | Refills: 1 | Status: SHIPPED | OUTPATIENT
Start: 2022-03-22 | End: 2022-10-18 | Stop reason: SDUPTHER

## 2022-03-22 RX ORDER — CETIRIZINE HYDROCHLORIDE 10 MG/1
10 TABLET ORAL DAILY
Qty: 30 TABLET | Refills: 11 | Status: SHIPPED | OUTPATIENT
Start: 2022-03-22 | End: 2022-10-18 | Stop reason: SDUPTHER

## 2022-03-22 RX ORDER — SIMETHICONE 80 MG
80 TABLET,CHEWABLE ORAL EVERY 6 HOURS PRN
Qty: 30 TABLET | Refills: 2 | Status: SHIPPED | OUTPATIENT
Start: 2022-03-22 | End: 2022-10-18 | Stop reason: SDUPTHER

## 2022-03-22 RX ORDER — MOMETASONE FUROATE AND FORMOTEROL FUMARATE DIHYDRATE 200; 5 UG/1; UG/1
2 AEROSOL RESPIRATORY (INHALATION)
Qty: 1 EACH | Refills: 5 | Status: SHIPPED | OUTPATIENT
Start: 2022-03-22 | End: 2022-10-18 | Stop reason: SDUPTHER

## 2022-03-22 RX ORDER — LOVASTATIN 20 MG/1
TABLET ORAL
Status: CANCELLED | OUTPATIENT
Start: 2022-03-22

## 2022-03-22 RX ORDER — PANTOPRAZOLE SODIUM 40 MG/1
40 TABLET, DELAYED RELEASE ORAL DAILY
Qty: 90 TABLET | Refills: 1 | Status: SHIPPED | OUTPATIENT
Start: 2022-03-22 | End: 2022-10-18 | Stop reason: SDUPTHER

## 2022-03-22 RX ORDER — LOVASTATIN 20 MG/1
20 TABLET ORAL NIGHTLY
Qty: 90 TABLET | Refills: 1 | Status: SHIPPED | OUTPATIENT
Start: 2022-03-22 | End: 2022-10-18 | Stop reason: SDUPTHER

## 2022-03-22 NOTE — PROGRESS NOTES
Chief Complaint  Hypertension and Hyperlipidemia    Subjective      History of Present Illness  Bentley Martínez presents to [facility]     Here for his routine checkup, medication refills and blood work.  He was not fasting today.    Asthma, he reports it has improved significantly over the last 6 months.    Hypertension, slightly elevated today but he has been out of the spironolactone.    Allergies, he is doing well with Zyrtec    Hyperlipidemia, he has been on lovastatin.  Last LDL was to target.    Reflux, this has been improving with Protonix.  He rarely has any breakthrough.    Bentley Martínez  reports that he quit smoking about 33 years ago. His smoking use included cigarettes. He has a 18.00 pack-year smoking history. He has never used smokeless tobacco.       Allergies  Patient has no known allergies.    Objective     Vitals:    03/22/22 1424   BP: 142/80   Pulse:    Resp:    Temp:    SpO2:      Body mass index is 22.43 kg/m².     Review of Systems    Physical Exam  Vitals reviewed.   Constitutional:       Appearance: Normal appearance. He is well-developed.   Neck:      Vascular: No carotid bruit.   Cardiovascular:      Rate and Rhythm: Normal rate and regular rhythm.      Heart sounds: Normal heart sounds. No murmur heard.  Pulmonary:      Effort: Pulmonary effort is normal.      Breath sounds: Normal breath sounds.   Musculoskeletal:      Cervical back: Normal range of motion and neck supple. No tenderness.   Neurological:      Mental Status: He is alert and oriented to person, place, and time.      Cranial Nerves: No cranial nerve deficit.      Motor: No weakness.   Psychiatric:         Mood and Affect: Mood and affect normal.         Result Review :    The following data was reviewed by: RAY Serrano on 03/22/2022:    Depression: Not at risk   • PHQ-2 Score: 0       Common labs    Common Labsle 4/29/21 4/29/21 7/1/21 7/1/21 7/1/21 9/13/21    0944 0944 4439  1016 1016    Glucose  92  86  82   BUN  11  13  9   Creatinine  1.00  1.00  0.98   eGFR Non African Am    77  79   Sodium  130 (A)  141  139   Potassium  4.0  4.3  4.2   Chloride  94 (A)  104  102   Calcium  9.0  9.1  9.1   Albumin  4.6  4.40  4.70   Total Bilirubin  0.31  0.3  0.3   Alkaline Phosphatase  64  57  72   AST (SGOT)  27  21  31   ALT (SGPT)  23  17  23   WBC 9.89  7.38      Hemoglobin 13.1 (A)  13.7      Hematocrit 38.8 (A)  39.8      Platelets 441 (A)  360      Total Cholesterol     201 (A)    Triglycerides     176 (A)    HDL Cholesterol     64 (A)    LDL Cholesterol      107 (A)    (A) Abnormal value                            Assessment and Plan     Diagnoses and all orders for this visit:    1. Essential hypertension (Primary)  Comments:  well controlled  Orders:  -     lisinopril (PRINIVIL,ZESTRIL) 40 MG tablet; Take 1 tablet by mouth Daily for 180 days.  Dispense: 30 tablet; Refill: 5  -     spironolactone (ALDACTONE) 25 MG tablet; Take 1 tablet by mouth Daily.  Dispense: 90 tablet; Refill: 1  -     Comprehensive Metabolic Panel    2. Mild intermittent asthma without complication  Comments:  currently well controlled and stable  Orders:  -     montelukast (SINGULAIR) 10 MG tablet; Take 1 tablet by mouth Every Night.  Dispense: 90 tablet; Refill: 1  -     Spiriva HandiHaler 18 MCG per inhalation capsule; Place 1 capsule into inhaler and inhale Daily.  Dispense: 90 capsule; Refill: 1  -     Dulera 200-5 MCG/ACT inhaler; Inhale 2 puffs 2 (Two) Times a Day.  Dispense: 1 each; Refill: 5  -     albuterol sulfate  (90 Base) MCG/ACT inhaler; Inhale 2 puffs Every 4 (Four) Hours As Needed for Wheezing or Shortness of Air.  Dispense: 8 g; Refill: 5    3. Mixed hyperlipidemia  Comments:  We will recheck lipids at next visit since he was not fasting  Orders:  -     Lipid Panel  -     lovastatin (MEVACOR) 20 MG tablet; Take 1 tablet by mouth Every Night.  Dispense: 90 tablet; Refill: 1    4.  Gastroesophageal reflux disease without esophagitis  Comments:  Well-controlled  Orders:  -     pantoprazole (PROTONIX) 40 MG EC tablet; Take 1 tablet by mouth Daily.  Dispense: 90 tablet; Refill: 1  -     CBC & Differential    5. Allergic rhinitis, unspecified seasonality, unspecified trigger  -     montelukast (SINGULAIR) 10 MG tablet; Take 1 tablet by mouth Every Night.  Dispense: 90 tablet; Refill: 1  -     cetirizine (zyrTEC) 10 MG tablet; Take 1 tablet by mouth Daily.  Dispense: 30 tablet; Refill: 11    6. Fatigue, unspecified type  -     TSH    7. Gas bloat syndrome  -     simethicone (Gas-X) 80 MG chewable tablet; Chew 1 tablet Every 6 (Six) Hours As Needed for Flatulence.  Dispense: 30 tablet; Refill: 2  -     pantoprazole (PROTONIX) 40 MG EC tablet; Take 1 tablet by mouth Daily.  Dispense: 90 tablet; Refill: 1    8. Need for influenza vaccination  -     FluLaval/Fluarix/Fluzone >6 Months    9. Screening for prostate cancer  -     PSA Screen    10. Need for hepatitis C screening test  -     Hepatitis C antibody    11. Need for pneumococcal vaccination  -     pneumococcal polysaccharide 23-valent (PNEUMOVAX-23) vaccine 0.5 mL    12. Alcohol abuse  Comments:  drinking about 6 beers per night or less.             Follow Up     Return in about 6 months (around 9/22/2022).    Patient was given instructions and counseling regarding his condition or for health maintenance advice. Please see specific information pulled into the AVS if appropriate.     RAY Serrano

## 2022-04-22 ENCOUNTER — OFFICE VISIT (OUTPATIENT)
Dept: FAMILY MEDICINE CLINIC | Facility: CLINIC | Age: 57
End: 2022-04-22

## 2022-04-22 VITALS
RESPIRATION RATE: 18 BRPM | TEMPERATURE: 98.2 F | OXYGEN SATURATION: 97 % | HEIGHT: 68 IN | BODY MASS INDEX: 22.4 KG/M2 | SYSTOLIC BLOOD PRESSURE: 116 MMHG | WEIGHT: 147.8 LBS | DIASTOLIC BLOOD PRESSURE: 72 MMHG | HEART RATE: 78 BPM

## 2022-04-22 DIAGNOSIS — N63.42 UNSPECIFIED LUMP IN LEFT BREAST, SUBAREOLAR: Primary | ICD-10-CM

## 2022-04-22 PROCEDURE — 99213 OFFICE O/P EST LOW 20 MIN: CPT | Performed by: NURSE PRACTITIONER

## 2022-04-22 RX ORDER — CEPHALEXIN 500 MG/1
500 CAPSULE ORAL 4 TIMES DAILY
Qty: 40 CAPSULE | Refills: 0 | Status: SHIPPED | OUTPATIENT
Start: 2022-04-22 | End: 2022-05-02

## 2022-04-22 NOTE — PROGRESS NOTES
"Chief Complaint  Cyst (Hard lump on left side of chest, painful, and warm to touch. Started one month ago)    Subjective          Bentley Martínez presents to [facility]  History of Present Illness  He noticed a bump over the left nipple about 1 month ago.  Then when he rolled over in bed there was tenderness on the left-hand side.  He does not have any redness or oozing noted.  No nipple drainage.  He feels that it is warm to touch at times.  No history of any other issues noted with the breast tissue.      Allergies  Patient has no known allergies.    Social History     Tobacco Use   • Smoking status: Former Smoker     Packs/day: 1.50     Years: 12.00     Pack years: 18.00     Types: Cigarettes     Quit date:      Years since quittin.3   • Smokeless tobacco: Never Used   Vaping Use   • Vaping Use: Never used   Substance Use Topics   • Alcohol use: Yes     Alcohol/week: 6.0 - 8.0 standard drinks     Types: 6 - 8 Cans of beer per week     Comment: Current every day   • Drug use: Yes     Types: Marijuana     Comment: USED TWO DAYS AGO       Family History   Problem Relation Age of Onset   • Heart disease Mother    • Heart disease Father    • Heart disease Brother         Health Maintenance Due   Topic Date Due   • TDAP/TD VACCINES (1 - Tdap) Never done        Immunization History   Administered Date(s) Administered   • COVID-19 (MODERNA) 1st, 2nd, 3rd Dose Only 2021, 2021, 2021   • FluLaval/Fluarix/Fluzone >6 2022   • Influenza, Unspecified 2019   • Pneumococcal Polysaccharide (PPSV23) 2022       Review of Systems   Skin: Negative for rash and bruise.        Objective       Vitals:    22 1134   BP: 116/72   Pulse: 78   Resp: 18   Temp: 98.2 °F (36.8 °C)   SpO2: 97%   Weight: 67 kg (147 lb 12.8 oz)   Height: 172.7 cm (68\")       Body mass index is 22.47 kg/m².         Physical Exam  Constitutional:       Appearance: Normal appearance. "   HENT:      Head: Normocephalic.   Pulmonary:      Effort: Pulmonary effort is normal.   Chest:   Breasts:      Right: Normal.      Left: Mass and tenderness present.         Skin:     Findings: No bruising.   Neurological:      General: No focal deficit present.      Mental Status: He is alert and oriented to person, place, and time.   Psychiatric:         Mood and Affect: Mood normal.         Behavior: Behavior normal.         Thought Content: Thought content normal.         Judgment: Judgment normal.             Result Review :     The following data was reviewed by: RAY Joseph on 04/22/2022:                     Assessment and Plan      Diagnoses and all orders for this visit:    1. Unspecified lump in left breast, subareolar (Primary)  -     cephalexin (Keflex) 500 MG capsule; Take 1 capsule by mouth 4 (Four) Times a Day for 10 days.  Dispense: 40 capsule; Refill: 0  -     Mammo Diagnostic Digital Tomosynthesis Bilateral With CAD; Future  -     US Breast Left Limited; Future            Follow Up     Return if symptoms worsen or fail to improve.  We will do diag mammo on both breast and target u/s on the left breast at the area of the areola.  We will treat with abx for prevention and from there.  Patient was given instructions and counseling regarding his condition or for health maintenance advice. Please see specific information pulled into the AVS if appropriate.         RAY Joseph  04/22/2022

## 2022-05-06 ENCOUNTER — LAB (OUTPATIENT)
Dept: LAB | Facility: HOSPITAL | Age: 57
End: 2022-05-06

## 2022-05-06 DIAGNOSIS — R79.89 ELEVATED SERUM CREATININE: ICD-10-CM

## 2022-05-06 DIAGNOSIS — R73.09 ELEVATED GLUCOSE: ICD-10-CM

## 2022-05-06 LAB
ALBUMIN SERPL-MCNC: 4.9 G/DL (ref 3.5–5.2)
ALBUMIN/GLOB SERPL: 2.3 G/DL
ALP SERPL-CCNC: 62 U/L (ref 39–117)
ALT SERPL W P-5'-P-CCNC: 25 U/L (ref 1–41)
ANION GAP SERPL CALCULATED.3IONS-SCNC: 11.9 MMOL/L (ref 5–15)
AST SERPL-CCNC: 37 U/L (ref 1–40)
BILIRUB SERPL-MCNC: 0.3 MG/DL (ref 0–1.2)
BUN SERPL-MCNC: 18 MG/DL (ref 6–20)
BUN/CREAT SERPL: 15.5 (ref 7–25)
CALCIUM SPEC-SCNC: 9.1 MG/DL (ref 8.6–10.5)
CHLORIDE SERPL-SCNC: 107 MMOL/L (ref 98–107)
CO2 SERPL-SCNC: 19.1 MMOL/L (ref 22–29)
CREAT SERPL-MCNC: 1.16 MG/DL (ref 0.76–1.27)
EGFRCR SERPLBLD CKD-EPI 2021: 73.5 ML/MIN/1.73
GLOBULIN UR ELPH-MCNC: 2.1 GM/DL
GLUCOSE SERPL-MCNC: 84 MG/DL (ref 65–99)
HBA1C MFR BLD: 5.9 % (ref 4.8–5.6)
POTASSIUM SERPL-SCNC: 4.6 MMOL/L (ref 3.5–5.2)
PROT SERPL-MCNC: 7 G/DL (ref 6–8.5)
SODIUM SERPL-SCNC: 138 MMOL/L (ref 136–145)

## 2022-05-06 PROCEDURE — 83036 HEMOGLOBIN GLYCOSYLATED A1C: CPT | Performed by: NURSE PRACTITIONER

## 2022-05-06 PROCEDURE — 80053 COMPREHEN METABOLIC PANEL: CPT | Performed by: NURSE PRACTITIONER

## 2022-05-06 PROCEDURE — 36415 COLL VENOUS BLD VENIPUNCTURE: CPT

## 2022-05-09 ENCOUNTER — TELEPHONE (OUTPATIENT)
Dept: FAMILY MEDICINE CLINIC | Facility: CLINIC | Age: 57
End: 2022-05-09

## 2022-05-09 NOTE — TELEPHONE ENCOUNTER
Caller: Bentley Martínez    Relationship to patient: Self    Best call back number: [phone]    Patient is needing: PATIENT CALLED STATING HE WAS SEEING ANABELLA AND SHE PUT HIM ON spironolactone (ALDACTONE) 25 MG tablet AND HE STATED HE IS JUST NOW REALIZING THAT HE NEVER HAD THAT MEDICATION SENT TO THE PHARMACY. PATIENT WOULD LIKE A CALL BACK TO LET HIM KNOW WHY PLEASE ADVISE THANK YOU.

## 2022-05-16 ENCOUNTER — HOSPITAL ENCOUNTER (OUTPATIENT)
Dept: ULTRASOUND IMAGING | Facility: HOSPITAL | Age: 57
Discharge: HOME | End: 2022-05-16

## 2022-05-16 ENCOUNTER — HOSPITAL ENCOUNTER (OUTPATIENT)
Dept: MAMMOGRAPHY | Facility: HOSPITAL | Age: 57
Discharge: HOME | End: 2022-05-16

## 2022-05-16 DIAGNOSIS — N63.42 UNSPECIFIED LUMP IN LEFT BREAST, SUBAREOLAR: ICD-10-CM

## 2022-05-16 PROCEDURE — 76642 ULTRASOUND BREAST LIMITED: CPT

## 2022-05-16 PROCEDURE — 77066 DX MAMMO INCL CAD BI: CPT

## 2022-05-16 PROCEDURE — G0279 TOMOSYNTHESIS, MAMMO: HCPCS

## 2022-05-29 ENCOUNTER — HOSPITAL ENCOUNTER (EMERGENCY)
Facility: HOSPITAL | Age: 57
Discharge: HOME | End: 2022-05-29
Attending: EMERGENCY MEDICINE | Admitting: EMERGENCY MEDICINE

## 2022-05-29 ENCOUNTER — APPOINTMENT (OUTPATIENT)
Dept: GENERAL RADIOLOGY | Facility: HOSPITAL | Age: 57
End: 2022-05-29

## 2022-05-29 VITALS
WEIGHT: 144.84 LBS | DIASTOLIC BLOOD PRESSURE: 75 MMHG | TEMPERATURE: 97.7 F | HEIGHT: 67 IN | BODY MASS INDEX: 22.73 KG/M2 | SYSTOLIC BLOOD PRESSURE: 135 MMHG | OXYGEN SATURATION: 98 % | RESPIRATION RATE: 16 BRPM | HEART RATE: 70 BPM

## 2022-05-29 DIAGNOSIS — R07.9 CHEST PAIN, UNSPECIFIED TYPE: Primary | ICD-10-CM

## 2022-05-29 LAB
ALBUMIN SERPL-MCNC: 5.1 G/DL (ref 3.5–5.2)
ALBUMIN/GLOB SERPL: 2 G/DL
ALP SERPL-CCNC: 65 U/L (ref 39–117)
ALT SERPL W P-5'-P-CCNC: 27 U/L (ref 1–41)
ANION GAP SERPL CALCULATED.3IONS-SCNC: 13.2 MMOL/L (ref 5–15)
AST SERPL-CCNC: 32 U/L (ref 1–40)
BASOPHILS # BLD AUTO: 0.07 10*3/MM3 (ref 0–0.2)
BASOPHILS NFR BLD AUTO: 1.1 % (ref 0–1.5)
BILIRUB SERPL-MCNC: 0.4 MG/DL (ref 0–1.2)
BUN SERPL-MCNC: 15 MG/DL (ref 6–20)
BUN/CREAT SERPL: 12.8 (ref 7–25)
CALCIUM SPEC-SCNC: 9.5 MG/DL (ref 8.6–10.5)
CHLORIDE SERPL-SCNC: 102 MMOL/L (ref 98–107)
CK MB SERPL-CCNC: 3.54 NG/ML
CK SERPL-CCNC: 207 U/L (ref 20–200)
CO2 SERPL-SCNC: 23.8 MMOL/L (ref 22–29)
CREAT SERPL-MCNC: 1.17 MG/DL (ref 0.76–1.27)
DEPRECATED RDW RBC AUTO: 45 FL (ref 37–54)
EGFRCR SERPLBLD CKD-EPI 2021: 72.7 ML/MIN/1.73
EOSINOPHIL # BLD AUTO: 0.34 10*3/MM3 (ref 0–0.4)
EOSINOPHIL NFR BLD AUTO: 5.4 % (ref 0.3–6.2)
ERYTHROCYTE [DISTWIDTH] IN BLOOD BY AUTOMATED COUNT: 13.1 % (ref 12.3–15.4)
GLOBULIN UR ELPH-MCNC: 2.6 GM/DL
GLUCOSE SERPL-MCNC: 88 MG/DL (ref 65–99)
HCT VFR BLD AUTO: 38.3 % (ref 37.5–51)
HGB BLD-MCNC: 12.9 G/DL (ref 13–17.7)
HOLD SPECIMEN: NORMAL
HOLD SPECIMEN: NORMAL
IMM GRANULOCYTES # BLD AUTO: 0.03 10*3/MM3 (ref 0–0.05)
IMM GRANULOCYTES NFR BLD AUTO: 0.5 % (ref 0–0.5)
LIPASE SERPL-CCNC: 31 U/L (ref 13–60)
LYMPHOCYTES # BLD AUTO: 1.75 10*3/MM3 (ref 0.7–3.1)
LYMPHOCYTES NFR BLD AUTO: 27.7 % (ref 19.6–45.3)
MAGNESIUM SERPL-MCNC: 2.3 MG/DL (ref 1.6–2.6)
MCH RBC QN AUTO: 31.4 PG (ref 26.6–33)
MCHC RBC AUTO-ENTMCNC: 33.7 G/DL (ref 31.5–35.7)
MCV RBC AUTO: 93.2 FL (ref 79–97)
MONOCYTES # BLD AUTO: 0.71 10*3/MM3 (ref 0.1–0.9)
MONOCYTES NFR BLD AUTO: 11.2 % (ref 5–12)
NEUTROPHILS NFR BLD AUTO: 3.42 10*3/MM3 (ref 1.7–7)
NEUTROPHILS NFR BLD AUTO: 54.1 % (ref 42.7–76)
NRBC BLD AUTO-RTO: 0 /100 WBC (ref 0–0.2)
NT-PROBNP SERPL-MCNC: 156.8 PG/ML (ref 0–900)
PLATELET # BLD AUTO: 388 10*3/MM3 (ref 140–450)
PMV BLD AUTO: 8.8 FL (ref 6–12)
POTASSIUM SERPL-SCNC: 4.2 MMOL/L (ref 3.5–5.2)
PROT SERPL-MCNC: 7.7 G/DL (ref 6–8.5)
RBC # BLD AUTO: 4.11 10*6/MM3 (ref 4.14–5.8)
SODIUM SERPL-SCNC: 139 MMOL/L (ref 136–145)
TROPONIN I SERPL-MCNC: 0.01 NG/ML (ref 0–0.6)
TROPONIN I SERPL-MCNC: 0.01 NG/ML (ref 0–0.6)
WBC NRBC COR # BLD: 6.32 10*3/MM3 (ref 3.4–10.8)
WHOLE BLOOD HOLD COAG: NORMAL
WHOLE BLOOD HOLD SPECIMEN: NORMAL

## 2022-05-29 PROCEDURE — 99283 EMERGENCY DEPT VISIT LOW MDM: CPT

## 2022-05-29 PROCEDURE — 93005 ELECTROCARDIOGRAM TRACING: CPT | Performed by: EMERGENCY MEDICINE

## 2022-05-29 PROCEDURE — 84484 ASSAY OF TROPONIN QUANT: CPT

## 2022-05-29 PROCEDURE — 83735 ASSAY OF MAGNESIUM: CPT | Performed by: EMERGENCY MEDICINE

## 2022-05-29 PROCEDURE — 36415 COLL VENOUS BLD VENIPUNCTURE: CPT

## 2022-05-29 PROCEDURE — 83880 ASSAY OF NATRIURETIC PEPTIDE: CPT

## 2022-05-29 PROCEDURE — 83690 ASSAY OF LIPASE: CPT | Performed by: EMERGENCY MEDICINE

## 2022-05-29 PROCEDURE — 85025 COMPLETE CBC W/AUTO DIFF WBC: CPT

## 2022-05-29 PROCEDURE — 82553 CREATINE MB FRACTION: CPT | Performed by: EMERGENCY MEDICINE

## 2022-05-29 PROCEDURE — 71045 X-RAY EXAM CHEST 1 VIEW: CPT

## 2022-05-29 PROCEDURE — 82550 ASSAY OF CK (CPK): CPT | Performed by: EMERGENCY MEDICINE

## 2022-05-29 PROCEDURE — 80053 COMPREHEN METABOLIC PANEL: CPT | Performed by: EMERGENCY MEDICINE

## 2022-05-29 PROCEDURE — 93005 ELECTROCARDIOGRAM TRACING: CPT

## 2022-05-29 RX ORDER — ASPIRIN 81 MG/1
324 TABLET, CHEWABLE ORAL ONCE
Status: COMPLETED | OUTPATIENT
Start: 2022-05-29 | End: 2022-05-29

## 2022-05-29 RX ORDER — SODIUM CHLORIDE 0.9 % (FLUSH) 0.9 %
10 SYRINGE (ML) INJECTION AS NEEDED
Status: DISCONTINUED | OUTPATIENT
Start: 2022-05-29 | End: 2022-05-29 | Stop reason: HOSPADM

## 2022-05-29 RX ADMIN — ASPIRIN 81 MG CHEWABLE TABLET 324 MG: 81 TABLET CHEWABLE at 09:37

## 2022-06-01 LAB
QT INTERVAL: 390 MS
QT INTERVAL: 395 MS

## 2022-10-18 DIAGNOSIS — K21.9 GASTROESOPHAGEAL REFLUX DISEASE WITHOUT ESOPHAGITIS: ICD-10-CM

## 2022-10-18 DIAGNOSIS — J30.9 ALLERGIC RHINITIS, UNSPECIFIED SEASONALITY, UNSPECIFIED TRIGGER: ICD-10-CM

## 2022-10-18 DIAGNOSIS — E78.2 MIXED HYPERLIPIDEMIA: ICD-10-CM

## 2022-10-18 DIAGNOSIS — I10 ESSENTIAL HYPERTENSION: ICD-10-CM

## 2022-10-18 DIAGNOSIS — J45.20 MILD INTERMITTENT ASTHMA WITHOUT COMPLICATION: ICD-10-CM

## 2022-10-18 DIAGNOSIS — K92.89 GAS BLOAT SYNDROME: ICD-10-CM

## 2022-10-18 RX ORDER — ALBUTEROL SULFATE 90 UG/1
2 AEROSOL, METERED RESPIRATORY (INHALATION) EVERY 4 HOURS PRN
Qty: 8 G | Refills: 5 | Status: SHIPPED | OUTPATIENT
Start: 2022-10-18 | End: 2022-11-03 | Stop reason: SDUPTHER

## 2022-10-18 RX ORDER — MONTELUKAST SODIUM 10 MG/1
10 TABLET ORAL NIGHTLY
Qty: 30 TABLET | Refills: 0 | Status: SHIPPED | OUTPATIENT
Start: 2022-10-18 | End: 2022-11-03 | Stop reason: SDUPTHER

## 2022-10-18 RX ORDER — TIOTROPIUM BROMIDE 18 UG/1
1 CAPSULE ORAL; RESPIRATORY (INHALATION)
Qty: 30 CAPSULE | Refills: 0 | Status: SHIPPED | OUTPATIENT
Start: 2022-10-18 | End: 2022-11-03 | Stop reason: SDUPTHER

## 2022-10-18 RX ORDER — SPIRONOLACTONE 25 MG/1
25 TABLET ORAL DAILY
Qty: 30 TABLET | Refills: 0 | Status: SHIPPED | OUTPATIENT
Start: 2022-10-18 | End: 2022-11-03 | Stop reason: SDUPTHER

## 2022-10-18 RX ORDER — MOMETASONE FUROATE AND FORMOTEROL FUMARATE DIHYDRATE 200; 5 UG/1; UG/1
2 AEROSOL RESPIRATORY (INHALATION)
Qty: 1 EACH | Refills: 5 | Status: SHIPPED | OUTPATIENT
Start: 2022-10-18 | End: 2022-11-03 | Stop reason: SDUPTHER

## 2022-10-18 RX ORDER — LOVASTATIN 20 MG/1
20 TABLET ORAL NIGHTLY
Qty: 30 TABLET | Refills: 0 | Status: SHIPPED | OUTPATIENT
Start: 2022-10-18 | End: 2022-11-03 | Stop reason: SDUPTHER

## 2022-10-18 RX ORDER — PANTOPRAZOLE SODIUM 40 MG/1
40 TABLET, DELAYED RELEASE ORAL DAILY
Qty: 30 TABLET | Refills: 0 | Status: SHIPPED | OUTPATIENT
Start: 2022-10-18 | End: 2022-11-03 | Stop reason: SDUPTHER

## 2022-10-18 RX ORDER — CETIRIZINE HYDROCHLORIDE 10 MG/1
10 TABLET ORAL DAILY
Qty: 30 TABLET | Refills: 11 | Status: SHIPPED | OUTPATIENT
Start: 2022-10-18

## 2022-10-18 RX ORDER — LISINOPRIL 40 MG/1
40 TABLET ORAL DAILY
Qty: 30 TABLET | Refills: 0 | Status: SHIPPED | OUTPATIENT
Start: 2022-10-18 | End: 2022-11-03 | Stop reason: SDUPTHER

## 2022-10-18 RX ORDER — SIMETHICONE 80 MG
80 TABLET,CHEWABLE ORAL EVERY 6 HOURS PRN
Qty: 30 TABLET | Refills: 2 | Status: SHIPPED | OUTPATIENT
Start: 2022-10-18 | End: 2023-02-07 | Stop reason: SDUPTHER

## 2022-10-18 NOTE — TELEPHONE ENCOUNTER
Caller: Bentley Martínez    Relationship: Self    Best call back number: [phone]    Requested Prescriptions:   Requested Prescriptions     Pending Prescriptions Disp Refills   • albuterol sulfate  (90 Base) MCG/ACT inhaler 8 g 5     Sig: Inhale 2 puffs Every 4 (Four) Hours As Needed for Wheezing or Shortness of Air.   • cetirizine (zyrTEC) 10 MG tablet 30 tablet 11     Sig: Take 1 tablet by mouth Daily.   • Dulera 200-5 MCG/ACT inhaler 1 each 5     Sig: Inhale 2 puffs 2 (Two) Times a Day.   • lisinopril (PRINIVIL,ZESTRIL) 40 MG tablet 30 tablet 5     Sig: Take 1 tablet by mouth Daily for 180 days.   • lovastatin (MEVACOR) 20 MG tablet 90 tablet 1     Sig: Take 1 tablet by mouth Every Night.   • montelukast (SINGULAIR) 10 MG tablet 90 tablet 1     Sig: Take 1 tablet by mouth Every Night.   • pantoprazole (PROTONIX) 40 MG EC tablet 90 tablet 1     Sig: Take 1 tablet by mouth Daily.   • simethicone (Gas-X) 80 MG chewable tablet 30 tablet 2     Sig: Chew 1 tablet Every 6 (Six) Hours As Needed for Flatulence.   • Spiriva HandiHaler 18 MCG per inhalation capsule 90 capsule 1     Sig: Place 1 capsule into inhaler and inhale Daily.   • spironolactone (ALDACTONE) 25 MG tablet 90 tablet 1     Sig: Take 1 tablet by mouth Daily.        [facility] [address] - [phone] [facility] [phone] [facility]     Additional details provided by patient: OUT OF MEDICATION     Does the patient have less than a 3 day supply:  [x] Yes  [] No    Hetal Morris Rep   10/18/22 08:40 EDT

## 2022-10-18 NOTE — TELEPHONE ENCOUNTER
Spoke with patient he is out of all medication. Aware 30 days sent to [place]'s pharmacy. Patient scheduled appointment 11/3/22.

## 2022-11-03 ENCOUNTER — OFFICE VISIT (OUTPATIENT)
Dept: FAMILY MEDICINE CLINIC | Facility: CLINIC | Age: 57
End: 2022-11-03

## 2022-11-03 VITALS
HEART RATE: 69 BPM | WEIGHT: 135.8 LBS | SYSTOLIC BLOOD PRESSURE: 131 MMHG | TEMPERATURE: 97.1 F | OXYGEN SATURATION: 97 % | BODY MASS INDEX: 21.31 KG/M2 | DIASTOLIC BLOOD PRESSURE: 77 MMHG | HEIGHT: 67 IN

## 2022-11-03 DIAGNOSIS — Z23 NEED FOR INFLUENZA VACCINATION: ICD-10-CM

## 2022-11-03 DIAGNOSIS — K21.9 GASTROESOPHAGEAL REFLUX DISEASE WITHOUT ESOPHAGITIS: ICD-10-CM

## 2022-11-03 DIAGNOSIS — J30.9 ALLERGIC RHINITIS, UNSPECIFIED SEASONALITY, UNSPECIFIED TRIGGER: ICD-10-CM

## 2022-11-03 DIAGNOSIS — I10 ESSENTIAL HYPERTENSION: ICD-10-CM

## 2022-11-03 DIAGNOSIS — Z23 NEED FOR TDAP VACCINATION: ICD-10-CM

## 2022-11-03 DIAGNOSIS — E78.2 MIXED HYPERLIPIDEMIA: ICD-10-CM

## 2022-11-03 DIAGNOSIS — R73.03 BORDERLINE DIABETES: Primary | ICD-10-CM

## 2022-11-03 DIAGNOSIS — K92.89 GAS BLOAT SYNDROME: ICD-10-CM

## 2022-11-03 DIAGNOSIS — D64.9 ANEMIA, UNSPECIFIED TYPE: ICD-10-CM

## 2022-11-03 DIAGNOSIS — J45.20 MILD INTERMITTENT ASTHMA WITHOUT COMPLICATION: ICD-10-CM

## 2022-11-03 DIAGNOSIS — J42 CHRONIC WHEEZY BRONCHITIS: ICD-10-CM

## 2022-11-03 DIAGNOSIS — F10.10 ALCOHOL ABUSE: ICD-10-CM

## 2022-11-03 LAB
ALBUMIN SERPL-MCNC: 4.7 G/DL (ref 3.5–5.2)
ALBUMIN/GLOB SERPL: 2 G/DL
ALP SERPL-CCNC: 55 U/L (ref 39–117)
ALT SERPL W P-5'-P-CCNC: 16 U/L (ref 1–41)
ANION GAP SERPL CALCULATED.3IONS-SCNC: 12.4 MMOL/L (ref 5–15)
AST SERPL-CCNC: 32 U/L (ref 1–40)
BASOPHILS # BLD AUTO: 0.07 10*3/MM3 (ref 0–0.2)
BASOPHILS NFR BLD AUTO: 1.3 % (ref 0–1.5)
BILIRUB SERPL-MCNC: 0.5 MG/DL (ref 0–1.2)
BUN SERPL-MCNC: 10 MG/DL (ref 6–20)
BUN/CREAT SERPL: 9 (ref 7–25)
CALCIUM SPEC-SCNC: 9 MG/DL (ref 8.6–10.5)
CHLORIDE SERPL-SCNC: 100 MMOL/L (ref 98–107)
CHOLEST SERPL-MCNC: 176 MG/DL (ref 0–200)
CO2 SERPL-SCNC: 23.6 MMOL/L (ref 22–29)
CREAT SERPL-MCNC: 1.11 MG/DL (ref 0.76–1.27)
DEPRECATED RDW RBC AUTO: 42.8 FL (ref 37–54)
EGFRCR SERPLBLD CKD-EPI 2021: 77 ML/MIN/1.73
EOSINOPHIL # BLD AUTO: 0.67 10*3/MM3 (ref 0–0.4)
EOSINOPHIL NFR BLD AUTO: 12.2 % (ref 0.3–6.2)
ERYTHROCYTE [DISTWIDTH] IN BLOOD BY AUTOMATED COUNT: 12.7 % (ref 12.3–15.4)
FERRITIN SERPL-MCNC: 106 NG/ML (ref 30–400)
GLOBULIN UR ELPH-MCNC: 2.3 GM/DL
GLUCOSE SERPL-MCNC: 82 MG/DL (ref 65–99)
HBA1C MFR BLD: 5.8 % (ref 4.8–5.6)
HCT VFR BLD AUTO: 35.7 % (ref 37.5–51)
HDLC SERPL-MCNC: 79 MG/DL (ref 40–60)
HGB BLD-MCNC: 12.3 G/DL (ref 13–17.7)
IMM GRANULOCYTES # BLD AUTO: 0.01 10*3/MM3 (ref 0–0.05)
IMM GRANULOCYTES NFR BLD AUTO: 0.2 % (ref 0–0.5)
IRON 24H UR-MRATE: 139 MCG/DL (ref 59–158)
IRON SATN MFR SERPL: 33 % (ref 20–50)
LDLC SERPL CALC-MCNC: 83 MG/DL (ref 0–100)
LDLC/HDLC SERPL: 1.04 {RATIO}
LYMPHOCYTES # BLD AUTO: 1.69 10*3/MM3 (ref 0.7–3.1)
LYMPHOCYTES NFR BLD AUTO: 30.8 % (ref 19.6–45.3)
MCH RBC QN AUTO: 32.2 PG (ref 26.6–33)
MCHC RBC AUTO-ENTMCNC: 34.5 G/DL (ref 31.5–35.7)
MCV RBC AUTO: 93.5 FL (ref 79–97)
MONOCYTES # BLD AUTO: 0.74 10*3/MM3 (ref 0.1–0.9)
MONOCYTES NFR BLD AUTO: 13.5 % (ref 5–12)
NEUTROPHILS NFR BLD AUTO: 2.31 10*3/MM3 (ref 1.7–7)
NEUTROPHILS NFR BLD AUTO: 42 % (ref 42.7–76)
NRBC BLD AUTO-RTO: 0 /100 WBC (ref 0–0.2)
PLATELET # BLD AUTO: 361 10*3/MM3 (ref 140–450)
PMV BLD AUTO: 9.4 FL (ref 6–12)
POTASSIUM SERPL-SCNC: 4.7 MMOL/L (ref 3.5–5.2)
PROT SERPL-MCNC: 7 G/DL (ref 6–8.5)
RBC # BLD AUTO: 3.82 10*6/MM3 (ref 4.14–5.8)
SODIUM SERPL-SCNC: 136 MMOL/L (ref 136–145)
TIBC SERPL-MCNC: 417 MCG/DL (ref 298–536)
TRANSFERRIN SERPL-MCNC: 280 MG/DL (ref 200–360)
TRIGL SERPL-MCNC: 73 MG/DL (ref 0–150)
VLDLC SERPL-MCNC: 14 MG/DL (ref 5–40)
WBC NRBC COR # BLD: 5.49 10*3/MM3 (ref 3.4–10.8)

## 2022-11-03 PROCEDURE — 83036 HEMOGLOBIN GLYCOSYLATED A1C: CPT | Performed by: NURSE PRACTITIONER

## 2022-11-03 PROCEDURE — 83540 ASSAY OF IRON: CPT | Performed by: NURSE PRACTITIONER

## 2022-11-03 PROCEDURE — 84466 ASSAY OF TRANSFERRIN: CPT | Performed by: NURSE PRACTITIONER

## 2022-11-03 PROCEDURE — 90471 IMMUNIZATION ADMIN: CPT | Performed by: NURSE PRACTITIONER

## 2022-11-03 PROCEDURE — 99214 OFFICE O/P EST MOD 30 MIN: CPT | Performed by: NURSE PRACTITIONER

## 2022-11-03 PROCEDURE — 82728 ASSAY OF FERRITIN: CPT | Performed by: NURSE PRACTITIONER

## 2022-11-03 PROCEDURE — 85025 COMPLETE CBC W/AUTO DIFF WBC: CPT | Performed by: NURSE PRACTITIONER

## 2022-11-03 PROCEDURE — 90472 IMMUNIZATION ADMIN EACH ADD: CPT | Performed by: NURSE PRACTITIONER

## 2022-11-03 PROCEDURE — 80061 LIPID PANEL: CPT | Performed by: NURSE PRACTITIONER

## 2022-11-03 PROCEDURE — 90686 IIV4 VACC NO PRSV 0.5 ML IM: CPT | Performed by: NURSE PRACTITIONER

## 2022-11-03 PROCEDURE — 80053 COMPREHEN METABOLIC PANEL: CPT | Performed by: NURSE PRACTITIONER

## 2022-11-03 PROCEDURE — 90715 TDAP VACCINE 7 YRS/> IM: CPT | Performed by: NURSE PRACTITIONER

## 2022-11-03 RX ORDER — TIOTROPIUM BROMIDE 18 UG/1
1 CAPSULE ORAL; RESPIRATORY (INHALATION)
Qty: 90 CAPSULE | Refills: 1 | Status: SHIPPED | OUTPATIENT
Start: 2022-11-03

## 2022-11-03 RX ORDER — SPIRONOLACTONE 25 MG/1
25 TABLET ORAL DAILY
Qty: 90 TABLET | Refills: 1 | Status: SHIPPED | OUTPATIENT
Start: 2022-11-03

## 2022-11-03 RX ORDER — MONTELUKAST SODIUM 10 MG/1
10 TABLET ORAL NIGHTLY
Qty: 90 TABLET | Refills: 1 | Status: SHIPPED | OUTPATIENT
Start: 2022-11-03

## 2022-11-03 RX ORDER — LISINOPRIL 40 MG/1
40 TABLET ORAL DAILY
Qty: 90 TABLET | Refills: 1 | Status: SHIPPED | OUTPATIENT
Start: 2022-11-03 | End: 2023-05-02

## 2022-11-03 RX ORDER — LOVASTATIN 20 MG/1
20 TABLET ORAL NIGHTLY
Qty: 90 TABLET | Refills: 1 | Status: SHIPPED | OUTPATIENT
Start: 2022-11-03

## 2022-11-03 RX ORDER — MOMETASONE FUROATE AND FORMOTEROL FUMARATE DIHYDRATE 200; 5 UG/1; UG/1
2 AEROSOL RESPIRATORY (INHALATION)
Qty: 1 EACH | Refills: 5 | Status: SHIPPED | OUTPATIENT
Start: 2022-11-03

## 2022-11-03 RX ORDER — ALBUTEROL SULFATE 90 UG/1
2 AEROSOL, METERED RESPIRATORY (INHALATION) EVERY 4 HOURS PRN
Qty: 8 G | Refills: 5 | Status: SHIPPED | OUTPATIENT
Start: 2022-11-03 | End: 2023-01-19 | Stop reason: SDUPTHER

## 2022-11-03 RX ORDER — PANTOPRAZOLE SODIUM 40 MG/1
40 TABLET, DELAYED RELEASE ORAL DAILY
Qty: 90 TABLET | Refills: 1 | Status: SHIPPED | OUTPATIENT
Start: 2022-11-03

## 2022-11-03 NOTE — ASSESSMENT & PLAN NOTE
Discussed with patient that he needs to decrease alcohol use.  Discussed that recommendation is no more than 2 drinks per day.  Handout provided, see AVS.

## 2022-11-03 NOTE — ASSESSMENT & PLAN NOTE
Hypertension is improving with treatment.  Continue current medications.  Blood pressure will be reassessed in 3 months.

## 2022-11-03 NOTE — ASSESSMENT & PLAN NOTE
Lipid abnormalities are to be rechecked today with Lipid Panel..  Lipid-lowering agent Lovastatin 20mg in use.  Lipids will be reassessed in 3 months.

## 2022-11-03 NOTE — ASSESSMENT & PLAN NOTE
Recheck Hgb A1C today for evaluation and treatment.   Discussed pre-diabetes diet. See AVS for instructions.

## 2022-11-03 NOTE — ASSESSMENT & PLAN NOTE
Asthma is stable per patient account..  The patient is experiencing frequent daytime asthma symptoms. He is experiencing frequent nighttime asthma symptoms.  Obtain CT of chest due to chronic wheezing despite treatment

## 2022-11-03 NOTE — PROGRESS NOTES
"Chief Complaint  Hypertension    Subjective          Bentley Martínez, 57 y.o. male presents to [facility]  History of Present Illness   Patient presents today for follow-up, med refills, and to establish care.  He is a previous patient of RAY Serrano.   Patient has Hypertension and Hyperlipidemia.Current Blood pressure is within normal range. His current medication regimen is Lisinopril 40mg daily and Spironoloactone 25mg daily.  Most recent blood work revealed elevated Triglyceride level of 198. He is currently taking Lovastatin 20mg  daily.   Most recent blood work revealed borderline diabetes with a Hgb A1C of 5.9%. Patient states he has been told in the past that he has pre-diabetes.  Patient denies any tobacco use and states he quit smoking back in 1989.  He admits to daily Marijuana use.  He drinks a 6 pack of beer daily and has been doing this for a very long time.  He works as a  at a hotel and is on his feet a lot during the day but denies any additional exercise or activity.  Patient did express some initial concern about current weight but then states he has fluctuated with his weight over the past few years. He denies any difficulties eating or any pain with eating. He denies any abdominal discomfort.      Objective   Vital Signs:   /77 (BP Location: Left arm, Patient Position: Sitting, Cuff Size: Adult)   Pulse 69   Temp 97.1 °F (36.2 °C)   Ht 170.2 cm (67\")   Wt 61.6 kg (135 lb 12.8 oz)   SpO2 97%   BMI 21.27 kg/m²     Current Outpatient Medications on File Prior to Visit   Medication Sig Dispense Refill   • cetirizine (zyrTEC) 10 MG tablet Take 1 tablet by mouth Daily. 30 tablet 11   • simethicone (Gas-X) 80 MG chewable tablet Chew 1 tablet Every 6 (Six) Hours As Needed for Flatulence. 30 tablet 2   • [DISCONTINUED] albuterol sulfate  (90 Base) MCG/ACT inhaler Inhale 2 puffs Every 4 (Four) Hours As Needed for " Wheezing or Shortness of Air. 8 g 5   • [DISCONTINUED] Dulera 200-5 MCG/ACT inhaler Inhale 2 puffs 2 (Two) Times a Day. 1 each 5   • [DISCONTINUED] lisinopril (PRINIVIL,ZESTRIL) 40 MG tablet Take 1 tablet by mouth Daily for 30 days. 30 tablet 0   • [DISCONTINUED] lovastatin (MEVACOR) 20 MG tablet Take 1 tablet by mouth Every Night. 30 tablet 0   • [DISCONTINUED] montelukast (SINGULAIR) 10 MG tablet Take 1 tablet by mouth Every Night. 30 tablet 0   • [DISCONTINUED] pantoprazole (PROTONIX) 40 MG EC tablet Take 1 tablet by mouth Daily. 30 tablet 0   • [DISCONTINUED] Spiriva HandiHaler 18 MCG per inhalation capsule Place 1 capsule into inhaler and inhale Daily. 30 capsule 0   • [DISCONTINUED] spironolactone (ALDACTONE) 25 MG tablet Take 1 tablet by mouth Daily. 30 tablet 0     No current facility-administered medications on file prior to visit.     Past Medical History:   Diagnosis Date   • Abdominal pain    • Alcohol abuse 01/28/2021   • Allergic rhinitis due to allergen 10/24/2018   • Asthma    • Black stool    • C. difficile diarrhea    • Essential hypertension 10/24/2018   • GERD (gastroesophageal reflux disease) 10/24/2018   • High cholesterol    • History of urinary retention     post op   • Hyperlipidemia 10/24/2018   • Hypertension    • Inguinal hernia    • Laryngeal candidiasis    • Voice hoarseness       Physical Exam  Vitals and nursing note reviewed.   Constitutional:       Appearance: He is normal weight.   Cardiovascular:      Rate and Rhythm: Normal rate and regular rhythm.      Heart sounds: Normal heart sounds.   Pulmonary:      Effort: Pulmonary effort is normal.      Breath sounds: Examination of the right-upper field reveals wheezing. Examination of the left-upper field reveals wheezing. Examination of the right-middle field reveals wheezing. Examination of the right-lower field reveals wheezing. Examination of the left-lower field reveals wheezing. Wheezing present.      Comments: Exertional  breathing efforts when carrying on conversation.  Abdominal:      General: Bowel sounds are normal.      Palpations: Abdomen is soft.      Tenderness: There is no abdominal tenderness.   Musculoskeletal:      Right lower leg: No edema.      Left lower leg: No edema.   Lymphadenopathy:      Cervical: No cervical adenopathy.   Skin:     General: Skin is warm.   Neurological:      Mental Status: He is alert and oriented to person, place, and time.   Psychiatric:         Attention and Perception: Attention normal.         Mood and Affect: Mood normal.         Behavior: Behavior is cooperative.        Result Review :     CMP    CMP 3/22/22 5/6/22 5/29/22   Glucose 116 (A) 84 88   BUN 19 18 15   Creatinine 1.40 (A) 1.16 1.17   Sodium 141 138 139   Potassium 4.3 4.6 4.2   Chloride 103 107 102   Calcium 9.2 9.1 9.5   Albumin 4.70 4.90 5.10   Total Bilirubin 0.2 0.3 0.4   Alkaline Phosphatase 66 62 65   AST (SGOT) 24 37 32   ALT (SGPT) 22 25 27   (A) Abnormal value            CBC    CBC 3/22/22 5/29/22   WBC 6.31 6.32   RBC 4.10 (A) 4.11 (A)   Hemoglobin 13.0 12.9 (A)   Hematocrit 38.1 38.3   MCV 92.9 93.2   MCH 31.7 31.4   MCHC 34.1 33.7   RDW 12.7 13.1   Platelets 384 388   (A) Abnormal value            Lipid Panel    Lipid Panel 3/22/22   Total Cholesterol 198   Triglycerides 198 (A)   HDL Cholesterol 76 (A)   VLDL Cholesterol 33   LDL Cholesterol  89   LDL/HDL Ratio 1.08   (A) Abnormal value            Most Recent A1C    HGBA1C Most Recent 5/6/22   Hemoglobin A1C 5.90 (A)   (A) Abnormal value            Data reviewed: Radiologic studies Chest x-ray from May 2022 reviewed.          Assessment and Plan    Diagnoses and all orders for this visit:    1. Borderline diabetes (Primary)  Assessment & Plan:  Recheck Hgb A1C today for evaluation and treatment.   Discussed pre-diabetes diet. See AVS for instructions.    Orders:  -     Hemoglobin A1c  -     Comprehensive metabolic panel    2. Mild intermittent asthma without  complication  Assessment & Plan:  Asthma is stable per patient account..  The patient is experiencing frequent daytime asthma symptoms. He is experiencing frequent nighttime asthma symptoms.  Obtain CT of chest due to chronic wheezing despite treatment        Orders:  -     montelukast (SINGULAIR) 10 MG tablet; Take 1 tablet by mouth Every Night.  Dispense: 90 tablet; Refill: 1  -     Spiriva HandiHaler 18 MCG per inhalation capsule; Place 1 capsule into inhaler and inhale Daily.  Dispense: 90 capsule; Refill: 1  -     Dulera 200-5 MCG/ACT inhaler; Inhale 2 puffs 2 (Two) Times a Day.  Dispense: 1 each; Refill: 5  -     albuterol sulfate  (90 Base) MCG/ACT inhaler; Inhale 2 puffs Every 4 (Four) Hours As Needed for Wheezing or Shortness of Air.  Dispense: 8 g; Refill: 5  -     CT Chest With & Without Contrast; Future    3. Allergic rhinitis, unspecified seasonality, unspecified trigger  -     montelukast (SINGULAIR) 10 MG tablet; Take 1 tablet by mouth Every Night.  Dispense: 90 tablet; Refill: 1    4. Essential hypertension  Assessment & Plan:  Hypertension is improving with treatment.  Continue current medications.  Blood pressure will be reassessed in 3 months.    Orders:  -     lisinopril (PRINIVIL,ZESTRIL) 40 MG tablet; Take 1 tablet by mouth Daily for 180 days.  Dispense: 90 tablet; Refill: 1  -     spironolactone (ALDACTONE) 25 MG tablet; Take 1 tablet by mouth Daily.  Dispense: 90 tablet; Refill: 1  -     CBC w AUTO Differential  -     Comprehensive metabolic panel  -     Lipid panel    5. Gas bloat syndrome  -     pantoprazole (PROTONIX) 40 MG EC tablet; Take 1 tablet by mouth Daily.  Dispense: 90 tablet; Refill: 1    6. Gastroesophageal reflux disease without esophagitis  Assessment & Plan:  Long-term effects of Pantoprazole use discussed. Encouraged to take every other day to monitor tolerance and then gradually decrease use if possible. Follow-up at next appointment in three months.    Orders:  -      pantoprazole (PROTONIX) 40 MG EC tablet; Take 1 tablet by mouth Daily.  Dispense: 90 tablet; Refill: 1    7. Mixed hyperlipidemia  Assessment & Plan:  Lipid abnormalities are to be rechecked today with Lipid Panel..  Lipid-lowering agent Lovastatin 20mg in use.  Lipids will be reassessed in 3 months.    Orders:  -     lovastatin (MEVACOR) 20 MG tablet; Take 1 tablet by mouth Every Night.  Dispense: 90 tablet; Refill: 1  -     CBC w AUTO Differential  -     Comprehensive metabolic panel  -     Lipid panel    8. Chronic wheezy bronchitis (HCC)  Comments:  Patient has chronic wheezing despite treatments.  CXR WNL.  CT of Lung for further evaluation and treatment.  Orders:  -     CT Chest With & Without Contrast; Future    9. Need for influenza vaccination  -     FluLaval/Fluarix/Fluzone >6 Months    10. Need for Tdap vaccination  -     Tdap Vaccine Greater Than or Equal To 6yo IM    11. Alcohol abuse  Assessment & Plan:  Discussed with patient that he needs to decrease alcohol use.  Discussed that recommendation is no more than 2 drinks per day.  Handout provided, see AVS.        Follow Up   Return in about 3 months (around 2/3/2023) for Next scheduled follow up.  Patient was given instructions and counseling regarding his condition or for health maintenance advice. Please see specific information pulled into the AVS if appropriate.     I reviewed all the information by my student Daylin Taylor, [facility] student, and I attest that all information is correct.    RAY Britt  11/03/2022

## 2022-11-03 NOTE — ASSESSMENT & PLAN NOTE
Long-term effects of Pantoprazole use discussed. Encouraged to take every other day to monitor tolerance and then gradually decrease use if possible. Follow-up at next appointment in three months.

## 2022-12-15 ENCOUNTER — HOSPITAL ENCOUNTER (OUTPATIENT)
Dept: CT IMAGING | Facility: HOSPITAL | Age: 57
Discharge: HOME | End: 2022-12-15

## 2022-12-15 ENCOUNTER — LAB (OUTPATIENT)
Dept: LAB | Facility: HOSPITAL | Age: 57
End: 2022-12-15

## 2022-12-15 ENCOUNTER — ANCILLARY ORDERS (OUTPATIENT)
Dept: FAMILY MEDICINE CLINIC | Facility: CLINIC | Age: 57
End: 2022-12-15

## 2022-12-15 DIAGNOSIS — D64.9 ANEMIA, UNSPECIFIED TYPE: ICD-10-CM

## 2022-12-15 DIAGNOSIS — J45.20 MILD INTERMITTENT ASTHMA WITHOUT COMPLICATION: ICD-10-CM

## 2022-12-15 DIAGNOSIS — J42 CHRONIC WHEEZY BRONCHITIS: ICD-10-CM

## 2022-12-15 DIAGNOSIS — I25.10 CORONARY ARTERY CALCIFICATION: Primary | ICD-10-CM

## 2022-12-15 DIAGNOSIS — I25.84 CORONARY ARTERY CALCIFICATION: Primary | ICD-10-CM

## 2022-12-15 PROCEDURE — 82607 VITAMIN B-12: CPT | Performed by: NURSE PRACTITIONER

## 2022-12-15 PROCEDURE — 82746 ASSAY OF FOLIC ACID SERUM: CPT | Performed by: NURSE PRACTITIONER

## 2022-12-15 PROCEDURE — 71260 CT THORAX DX C+: CPT

## 2022-12-15 PROCEDURE — 0 IOPAMIDOL PER 1 ML: Performed by: NURSE PRACTITIONER

## 2022-12-15 RX ADMIN — IOPAMIDOL 100 ML: 755 INJECTION, SOLUTION INTRAVENOUS at 13:26

## 2022-12-16 DIAGNOSIS — E53.8 LOW VITAMIN B12 LEVEL: Primary | ICD-10-CM

## 2022-12-16 LAB
FOLATE SERPL-MCNC: 5.65 NG/ML (ref 4.78–24.2)
VIT B12 BLD-MCNC: 325 PG/ML (ref 211–946)

## 2022-12-16 RX ORDER — LANOLIN ALCOHOL/MO/W.PET/CERES
1000 CREAM (GRAM) TOPICAL DAILY
Qty: 30 TABLET | Refills: 5 | Status: SHIPPED | OUTPATIENT
Start: 2022-12-16

## 2023-01-19 DIAGNOSIS — J45.20 MILD INTERMITTENT ASTHMA WITHOUT COMPLICATION: ICD-10-CM

## 2023-01-19 RX ORDER — ALBUTEROL SULFATE 90 UG/1
2 AEROSOL, METERED RESPIRATORY (INHALATION) EVERY 4 HOURS PRN
Qty: 8 G | Refills: 0 | Status: SHIPPED | OUTPATIENT
Start: 2023-01-19 | End: 2023-02-07 | Stop reason: SDUPTHER

## 2023-01-30 ENCOUNTER — OFFICE VISIT (OUTPATIENT)
Dept: CARDIOLOGY | Facility: CLINIC | Age: 58
End: 2023-01-30
Payer: MEDICAID

## 2023-01-30 VITALS
HEART RATE: 85 BPM | BODY MASS INDEX: 27.88 KG/M2 | WEIGHT: 142 LBS | HEIGHT: 60 IN | DIASTOLIC BLOOD PRESSURE: 73 MMHG | SYSTOLIC BLOOD PRESSURE: 110 MMHG

## 2023-01-30 DIAGNOSIS — E78.2 HYPERLIPEMIA, MIXED: ICD-10-CM

## 2023-01-30 DIAGNOSIS — I25.84 CORONARY ARTERY CALCIFICATION: ICD-10-CM

## 2023-01-30 DIAGNOSIS — I25.10 CORONARY ARTERY CALCIFICATION: ICD-10-CM

## 2023-01-30 DIAGNOSIS — I10 HYPERTENSION, ESSENTIAL: ICD-10-CM

## 2023-01-30 DIAGNOSIS — R07.2 PRECORDIAL PAIN: Primary | ICD-10-CM

## 2023-01-30 PROCEDURE — 99204 OFFICE O/P NEW MOD 45 MIN: CPT | Performed by: INTERNAL MEDICINE

## 2023-01-30 RX ORDER — ASPIRIN 81 MG/1
81 TABLET ORAL DAILY
Qty: 99 TABLET | Refills: 99
Start: 2023-01-30

## 2023-01-30 NOTE — PROGRESS NOTES
"Chief Complaint  Coronary Artery Calcification    Subjective            Bentley Martínez presents to [facility]  History of Present Illness    57-year-old white male.  He has no previous cardiovascular history.  He is a former smoker.  He has asthma.  He had a CT of his chest recently with a productive cough.  This showed coronary calcifications.  The patient has had some pressure and discomfort in the base of his throat in the upper chest.  He was evaluated in the emergency room previously and his work-up there was normal.  He has not had a recent cardiac work-up otherwise.    PMH  Past Medical History:   Diagnosis Date   • Abdominal pain    • Alcohol abuse 2021   • Allergic rhinitis due to allergen 10/24/2018   • Asthma    • Black stool    • C. difficile diarrhea    • Essential hypertension 10/24/2018   • GERD (gastroesophageal reflux disease) 10/24/2018   • High cholesterol    • History of urinary retention     post op   • Hyperlipidemia 10/24/2018   • Hypertension    • Inguinal hernia    • Laryngeal candidiasis    • Voice hoarseness          SURGICALHX  Past Surgical History:   Procedure Laterality Date   • CYST REMOVAL      Back of neck   • HERNIA REPAIR      \"30 years ago\"   • INGUINAL HERNIA REPAIR Right 2021    Procedure: INGUINAL HERNIA REPAIR LAPAROSCOPIC WITH DAVINCI ROBOT;  Surgeon: Orlando Leon MD;  Location: [facility] OR;  Service: Robotics - DaVinci;  Laterality: Right;        SOC  Social History     Socioeconomic History   • Marital status: Single   Tobacco Use   • Smoking status: Former     Packs/day: 1.50     Years: 12.00     Pack years: 18.00     Types: Cigarettes     Quit date:      Years since quittin.1   • Smokeless tobacco: Never   Vaping Use   • Vaping Use: Never used   Substance and Sexual Activity   • Alcohol use: Yes     Alcohol/week: 6.0 - 8.0 standard drinks     Types: 6 - 8 Cans of beer per week     Comment: Current every day   • " Drug use: Yes     Types: Marijuana     Comment: USED TWO DAYS AGO   • Sexual activity: Defer         FAMHX  Family History   Problem Relation Age of Onset   • Heart disease Mother    • Heart disease Father    • Heart disease Brother           ALLERGY  No Known Allergies     MEDSCURRENT    Current Outpatient Medications:   •  albuterol sulfate  (90 Base) MCG/ACT inhaler, Inhale 2 puffs Every 4 (Four) Hours As Needed for Wheezing or Shortness of Air., Disp: 8 g, Rfl: 0  •  cetirizine (zyrTEC) 10 MG tablet, Take 1 tablet by mouth Daily., Disp: 30 tablet, Rfl: 11  •  Dulera 200-5 MCG/ACT inhaler, Inhale 2 puffs 2 (Two) Times a Day., Disp: 1 each, Rfl: 5  •  lisinopril (PRINIVIL,ZESTRIL) 40 MG tablet, Take 1 tablet by mouth Daily for 180 days., Disp: 90 tablet, Rfl: 1  •  lovastatin (MEVACOR) 20 MG tablet, Take 1 tablet by mouth Every Night., Disp: 90 tablet, Rfl: 1  •  montelukast (SINGULAIR) 10 MG tablet, Take 1 tablet by mouth Every Night., Disp: 90 tablet, Rfl: 1  •  pantoprazole (PROTONIX) 40 MG EC tablet, Take 1 tablet by mouth Daily., Disp: 90 tablet, Rfl: 1  •  simethicone (Gas-X) 80 MG chewable tablet, Chew 1 tablet Every 6 (Six) Hours As Needed for Flatulence., Disp: 30 tablet, Rfl: 2  •  Spiriva HandiHaler 18 MCG per inhalation capsule, Place 1 capsule into inhaler and inhale Daily., Disp: 90 capsule, Rfl: 1  •  spironolactone (ALDACTONE) 25 MG tablet, Take 1 tablet by mouth Daily., Disp: 90 tablet, Rfl: 1  •  vitamin B-12 (CYANOCOBALAMIN) 1000 MCG tablet, Take 1 tablet by mouth Daily., Disp: 30 tablet, Rfl: 5  •  aspirin 81 MG EC tablet, Take 1 tablet by mouth Daily., Disp: 99 tablet, Rfl: 99      Review of Systems   Constitutional: Negative.   HENT: Negative.    Eyes: Negative.    Cardiovascular: Positive for chest pain.   Respiratory: Negative.    Endocrine: Negative.    Hematologic/Lymphatic: Negative.    Skin: Negative.    Musculoskeletal: Negative.    Gastrointestinal: Negative.    Genitourinary:  "Negative.    Neurological: Negative.    Psychiatric/Behavioral: Negative.         Objective     /73   Pulse 85   Ht 68 cm (26.77\")   Wt 64.4 kg (142 lb)   .29 kg/m²       General Appearance:   · well developed  · well nourished  HENT:   · oropharynx moist  · lips not cyanotic  Neck:  · thyroid not enlarged  · supple  Respiratory:  · no respiratory distress  · normal breath sounds  · no rales  Cardiovascular:  · no jugular venous distention  · regular rhythm  · apical impulse normal  · S1 normal, S2 normal  · no S3, no S4   · no murmur  · no rub, no thrill  · carotid pulses normal; no bruit  · pedal pulses normal  · lower extremity edema: none    Musculoskeletal:  · no clubbing of fingers.   · normocephalic, head atraumatic  Skin:   · warm, dry  Psychiatric:  · judgement and insight appropriate  · normal mood and affect      Result Review :     The following data was reviewed by: Varghese Garcia MD on 01/30/2023:    CMP    CMP 5/6/22 5/29/22 11/3/22   Glucose 84 88 82   BUN 18 15 10   Creatinine 1.16 1.17 1.11   eGFR 73.5 72.7 77.0   Sodium 138 139 136   Potassium 4.6 4.2 4.7   Chloride 107 102 100   Calcium 9.1 9.5 9.0   Total Protein 7.0 7.7 7.0   Albumin 4.90 5.10 4.70   Globulin 2.1 2.6 2.3   Total Bilirubin 0.3 0.4 0.5   Alkaline Phosphatase 62 65 55   AST (SGOT) 37 32 32   ALT (SGPT) 25 27 16   Albumin/Globulin Ratio 2.3 2.0 2.0   BUN/Creatinine Ratio 15.5 12.8 9.0   Anion Gap 11.9 13.2 12.4      Comments are available for some flowsheets but are not being displayed.           CBC    CBC 3/22/22 5/29/22 11/3/22   WBC 6.31 6.32 5.49   RBC 4.10 (A) 4.11 (A) 3.82 (A)   Hemoglobin 13.0 12.9 (A) 12.3 (A)   Hematocrit 38.1 38.3 35.7 (A)   MCV 92.9 93.2 93.5   MCH 31.7 31.4 32.2   MCHC 34.1 33.7 34.5   RDW 12.7 13.1 12.7   Platelets 384 388 361   (A) Abnormal value            Lipid Panel    Lipid Panel 3/22/22 11/3/22   Total Cholesterol 198 176   Triglycerides 198 (A) 73   HDL Cholesterol " 76 (A) 79 (A)   VLDL Cholesterol 33 14   LDL Cholesterol  89 83   LDL/HDL Ratio 1.08 1.04   (A) Abnormal value            TSH    TSH 3/22/22   TSH 1.920             Data reviewed: Primary care records reviewed, CT chest reviewed     Procedures               Assessment and Plan        ASSESSMENT:  Encounter Diagnoses   Name Primary?   • Precordial pain Yes   • Coronary artery calcification    • Hypertension, essential    • Hyperlipemia, mixed          PLAN:    1.  Precordial pain-somewhat atypical and has not been recurring frequently.  He does have cardiovascular risk factors and coronary calcifications noted on CT scan.  A stress imaging study will be scheduled.  We will choose a pharmacologic stress test due to his problems with walking on a treadmill  2.  Coronary artery calcification-we discussed this in detail today.  I am adding low-dose aspirin.  Continue statin therapy, his LDL is stable currently.  3.  Essential hypertension, stable, continue current medical therapy  4.  Mixed hyperlipidemia-stable, continue statin therapy    We will discuss stress imaging results with the patient, if this appears low risk he will be followed as needed in the future          Patient was given instructions and counseling regarding his condition or for health maintenance advice. Please see specific information pulled into the AVS if appropriate.             RED Garcia MD  1/30/2023    14:06 EST

## 2023-02-07 ENCOUNTER — OFFICE VISIT (OUTPATIENT)
Dept: FAMILY MEDICINE CLINIC | Facility: CLINIC | Age: 58
End: 2023-02-07
Payer: MEDICAID

## 2023-02-07 VITALS
BODY MASS INDEX: 136.35 KG/M2 | OXYGEN SATURATION: 96 % | HEART RATE: 69 BPM | TEMPERATURE: 98 F | WEIGHT: 139 LBS | SYSTOLIC BLOOD PRESSURE: 128 MMHG | DIASTOLIC BLOOD PRESSURE: 77 MMHG

## 2023-02-07 DIAGNOSIS — J45.40 MODERATE PERSISTENT ASTHMA WITHOUT COMPLICATION: Primary | ICD-10-CM

## 2023-02-07 DIAGNOSIS — D64.9 ANEMIA, UNSPECIFIED TYPE: ICD-10-CM

## 2023-02-07 DIAGNOSIS — K21.9 GASTROESOPHAGEAL REFLUX DISEASE WITHOUT ESOPHAGITIS: ICD-10-CM

## 2023-02-07 DIAGNOSIS — K92.89 GAS BLOAT SYNDROME: ICD-10-CM

## 2023-02-07 DIAGNOSIS — Z12.11 SCREENING FOR MALIGNANT NEOPLASM OF COLON: ICD-10-CM

## 2023-02-07 DIAGNOSIS — F10.10 ALCOHOL ABUSE: ICD-10-CM

## 2023-02-07 LAB
BASOPHILS # BLD AUTO: 0.07 10*3/MM3 (ref 0–0.2)
BASOPHILS NFR BLD AUTO: 1.3 % (ref 0–1.5)
DEPRECATED RDW RBC AUTO: 42.9 FL (ref 37–54)
EOSINOPHIL # BLD AUTO: 0.64 10*3/MM3 (ref 0–0.4)
EOSINOPHIL NFR BLD AUTO: 11.5 % (ref 0.3–6.2)
ERYTHROCYTE [DISTWIDTH] IN BLOOD BY AUTOMATED COUNT: 12.7 % (ref 12.3–15.4)
FOLATE SERPL-MCNC: 9.07 NG/ML (ref 4.78–24.2)
HCT VFR BLD AUTO: 36.2 % (ref 37.5–51)
HGB BLD-MCNC: 12.4 G/DL (ref 13–17.7)
IMM GRANULOCYTES # BLD AUTO: 0.02 10*3/MM3 (ref 0–0.05)
IMM GRANULOCYTES NFR BLD AUTO: 0.4 % (ref 0–0.5)
LYMPHOCYTES # BLD AUTO: 1.78 10*3/MM3 (ref 0.7–3.1)
LYMPHOCYTES NFR BLD AUTO: 32.1 % (ref 19.6–45.3)
MCH RBC QN AUTO: 31.8 PG (ref 26.6–33)
MCHC RBC AUTO-ENTMCNC: 34.3 G/DL (ref 31.5–35.7)
MCV RBC AUTO: 92.8 FL (ref 79–97)
MONOCYTES # BLD AUTO: 0.65 10*3/MM3 (ref 0.1–0.9)
MONOCYTES NFR BLD AUTO: 11.7 % (ref 5–12)
NEUTROPHILS NFR BLD AUTO: 2.39 10*3/MM3 (ref 1.7–7)
NEUTROPHILS NFR BLD AUTO: 43 % (ref 42.7–76)
NRBC BLD AUTO-RTO: 0 /100 WBC (ref 0–0.2)
PLATELET # BLD AUTO: 438 10*3/MM3 (ref 140–450)
PMV BLD AUTO: 9.1 FL (ref 6–12)
RBC # BLD AUTO: 3.9 10*6/MM3 (ref 4.14–5.8)
VIT B12 BLD-MCNC: 1002 PG/ML (ref 211–946)
WBC NRBC COR # BLD: 5.55 10*3/MM3 (ref 3.4–10.8)

## 2023-02-07 PROCEDURE — 82607 VITAMIN B-12: CPT | Performed by: NURSE PRACTITIONER

## 2023-02-07 PROCEDURE — 99214 OFFICE O/P EST MOD 30 MIN: CPT | Performed by: NURSE PRACTITIONER

## 2023-02-07 PROCEDURE — 82746 ASSAY OF FOLIC ACID SERUM: CPT | Performed by: NURSE PRACTITIONER

## 2023-02-07 PROCEDURE — 85025 COMPLETE CBC W/AUTO DIFF WBC: CPT | Performed by: NURSE PRACTITIONER

## 2023-02-07 RX ORDER — ALBUTEROL SULFATE 90 UG/1
2 AEROSOL, METERED RESPIRATORY (INHALATION) EVERY 4 HOURS PRN
Qty: 8 G | Refills: 5 | Status: SHIPPED | OUTPATIENT
Start: 2023-02-07

## 2023-02-07 RX ORDER — BUDESONIDE, GLYCOPYRROLATE, AND FORMOTEROL FUMARATE 160; 9; 4.8 UG/1; UG/1; UG/1
2 AEROSOL, METERED RESPIRATORY (INHALATION) 2 TIMES DAILY
Qty: 2 EACH | Refills: 0 | COMMUNITY
Start: 2023-02-07 | End: 2023-03-07

## 2023-02-07 RX ORDER — SIMETHICONE 80 MG
80 TABLET,CHEWABLE ORAL EVERY 6 HOURS PRN
Qty: 30 TABLET | Refills: 5 | Status: SHIPPED | OUTPATIENT
Start: 2023-02-07

## 2023-02-07 NOTE — ASSESSMENT & PLAN NOTE
Discussed with patient that due to increased risk for Varices with alcohol consumption, Hypertension and history of GERD that he should obtain EGD for further assessment.

## 2023-02-07 NOTE — ASSESSMENT & PLAN NOTE
Asthma is improving but still having wheezing.  The patient is experiencing weekly daytime asthma symptoms. He is experiencing frequent nighttime asthma symptoms.  Discussed monitoring symptoms and use of quick-relief medications and contacting us early in the course of exacerbations.  Warning signs of respiratory distress were reviewed with the patient.   Medications: continue Albuterol inhaler as needed for wheezing, begin Breztri inhaler 2 puffs twice daily (Samples given, advised to rinse mouth after each use) and Advised will try samples of Breztri, advised not to use Dulera and Spiriva while on Breztri..  Discussed technique for using MDIs and/or nebulizer.  Follow up in 1 month, or sooner should new symptoms or problems arise.

## 2023-02-07 NOTE — PROGRESS NOTES
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 male presents to [facility]  History of Present Illness   Patient presents today for three month follow-up on Hypertension.     Hypertension; Currently controlled with Lisinopril 40mg daily; He has a wrist blood pressure cuff at home and checks his blood pressure occasionally but states it's always elevated with that monitor. Everytime he presents to a visit, his blood pressure is within normal limits. He denies any dizziness, headaches or lightheadedness.    Hyperlipidemia; Most recent Lipid panel with only slightly elevated HDL. Currently managed with Lovastatin 20mg.He describes his diet as poor and acknowledges that he may need to increase his vegetable intake    Hgb A1c 5.8% at last visit. Patient acknowledges that he needs to work on his diet.    Patient continues to smoke Marijuana daily but denies any tobacco use.    Patient continues to drink a six-pack of beer daily.     Patient has persistent Asthma. CT of lungs most recently obtained was negative. Patient continues to experience chronic wheezing but states it's much improved from the past. Patient is currently utilizing Albuterol, Dulera and Spiriva.       Tobacco Use: Medium Risk   • Smoking Tobacco Use: Former   • Smokeless Tobacco Use: Never   • Passive Exposure: Not on file      Objective   Vital Signs:   /77 (BP Location: Left arm, Patient Position: Sitting, Cuff Size: Adult)   Pulse 69   Temp 98 °F (36.7 °C)   Wt 63 kg (139 lb)   SpO2 96%   .35 kg/m²       Current Outpatient Medications:   •  albuterol sulfate  (90 Base) MCG/ACT inhaler,  Inhale 2 puffs Every 4 (Four) Hours As Needed for Wheezing or Shortness of Air., Disp: 8 g, Rfl: 5  •  aspirin 81 MG EC tablet, Take 1 tablet by mouth Daily., Disp: 99 tablet, Rfl: 99  •  cetirizine (zyrTEC) 10 MG tablet, Take 1 tablet by mouth Daily., Disp: 30 tablet, Rfl: 11  •  Dulera 200-5 MCG/ACT inhaler, Inhale 2 puffs 2 (Two) Times a Day., Disp: 1 each, Rfl: 5  •  lisinopril (PRINIVIL,ZESTRIL) 40 MG tablet, Take 1 tablet by mouth Daily for 180 days., Disp: 90 tablet, Rfl: 1  •  lovastatin (MEVACOR) 20 MG tablet, Take 1 tablet by mouth Every Night., Disp: 90 tablet, Rfl: 1  •  montelukast (SINGULAIR) 10 MG tablet, Take 1 tablet by mouth Every Night., Disp: 90 tablet, Rfl: 1  •  pantoprazole (PROTONIX) 40 MG EC tablet, Take 1 tablet by mouth Daily., Disp: 90 tablet, Rfl: 1  •  simethicone (Gas-X) 80 MG chewable tablet, Chew 1 tablet Every 6 (Six) Hours As Needed for Flatulence., Disp: 30 tablet, Rfl: 5  •  Spiriva HandiHaler 18 MCG per inhalation capsule, Place 1 capsule into inhaler and inhale Daily., Disp: 90 capsule, Rfl: 1  •  spironolactone (ALDACTONE) 25 MG tablet, Take 1 tablet by mouth Daily., Disp: 90 tablet, Rfl: 1  •  vitamin B-12 (CYANOCOBALAMIN) 1000 MCG tablet, Take 1 tablet by mouth Daily., Disp: 30 tablet, Rfl: 5  •  Budeson-Glycopyrrol-Formoterol (Breztri Aerosphere) 160-9-4.8 MCG/ACT aerosol inhaler, Inhale 2 puffs 2 (Two) Times a Day., Disp: 2 each, Rfl: 0   Past Medical History:   Diagnosis Date   • Abdominal pain    • Alcohol abuse 01/28/2021   • Allergic rhinitis due to allergen 10/24/2018   • Asthma    • Black stool    • C. difficile diarrhea    • Essential hypertension 10/24/2018   • GERD (gastroesophageal reflux disease) 10/24/2018   • High cholesterol    • History of urinary retention     post op   • Hyperlipidemia 10/24/2018   • Hypertension    • Inguinal hernia    • Laryngeal candidiasis    • Voice hoarseness       Physical Exam  Vitals and nursing note reviewed.   Constitutional:        Appearance: Normal appearance. He is well-developed and normal weight.   Neck:      Thyroid: No thyroid mass, thyromegaly or thyroid tenderness.   Cardiovascular:      Rate and Rhythm: Normal rate and regular rhythm.      Heart sounds: Normal heart sounds. No murmur heard.    No friction rub. No gallop.   Pulmonary:      Effort: Pulmonary effort is normal.      Breath sounds: Examination of the right-upper field reveals wheezing. Examination of the left-upper field reveals wheezing. Examination of the right-lower field reveals wheezing. Examination of the left-lower field reveals wheezing. Wheezing present. No rhonchi.   Abdominal:      General: Abdomen is flat. Bowel sounds are normal.   Musculoskeletal:      Cervical back: Full passive range of motion without pain and normal range of motion.   Lymphadenopathy:      Cervical: No cervical adenopathy.   Skin:     General: Skin is warm and dry.   Neurological:      Mental Status: He is alert and oriented to person, place, and time.      Cranial Nerves: No cranial nerve deficit.   Psychiatric:         Attention and Perception: Attention normal.         Mood and Affect: Mood and affect normal.         Behavior: Behavior normal.         Thought Content: Thought content normal. Thought content does not include homicidal or suicidal ideation.         Judgment: Judgment normal.        Result Review :     Common labs    Common Labs 5/6/22 5/6/22 5/29/22 5/29/22 11/3/22 11/3/22 11/3/22 11/3/22    0815 0815 0838 0838 0802 0802 0802 0802   Glucose 84   88    82   BUN 18   15    10   Creatinine 1.16   1.17    1.11   Sodium 138   139    136   Potassium 4.6   4.2    4.7   Chloride 107   102    100   Calcium 9.1   9.5    9.0   Albumin 4.90   5.10    4.70   Total Bilirubin 0.3   0.4    0.5   Alkaline Phosphatase 62   65    55   AST (SGOT) 37   32    32   ALT (SGPT) 25   27    16   WBC   6.32   5.49     Hemoglobin   12.9 (A)   12.3 (A)     Hematocrit   38.3   35.7 (A)      Platelets   388   361     Total Cholesterol       176    Triglycerides       73    HDL Cholesterol       79 (A)    LDL Cholesterol        83    Hemoglobin A1C  5.90 (A)   5.80 (A)      (A) Abnormal value                   Component  Ref Range & Units 1 mo ago 1 yr ago 2 yr ago 3 yr ago   Folate  4.78 - 24.20 ng/mL 5.65  7.6 R, CM   8.1 R, CM    Vitamin B-12  211 - 946 pg/mL 325  427 R  474 R  407           Assessment and Plan    Diagnoses and all orders for this visit:    1. Moderate persistent asthma without complication (Primary)  Assessment & Plan:  Asthma is improving but still having wheezing.  The patient is experiencing weekly daytime asthma symptoms. He is experiencing frequent nighttime asthma symptoms.  Discussed monitoring symptoms and use of quick-relief medications and contacting us early in the course of exacerbations.  Warning signs of respiratory distress were reviewed with the patient.   Medications: continue Albuterol inhaler as needed for wheezing, begin Breztri inhaler 2 puffs twice daily (Samples given, advised to rinse mouth after each use) and Advised will try samples of Breztri, advised not to use Dulera and Spiriva while on Breztri..  Discussed technique for using MDIs and/or nebulizer.  Follow up in 1 month, or sooner should new symptoms or problems arise.        Orders:  -     albuterol sulfate  (90 Base) MCG/ACT inhaler; Inhale 2 puffs Every 4 (Four) Hours As Needed for Wheezing or Shortness of Air.  Dispense: 8 g; Refill: 5  -     Budeson-Glycopyrrol-Formoterol (Breztri Aerosphere) 160-9-4.8 MCG/ACT aerosol inhaler; Inhale 2 puffs 2 (Two) Times a Day.  Dispense: 2 each; Refill: 0    2. Anemia, unspecified type  Assessment & Plan:  We discussed due to his chronic anemia, alcohol abuse and GERD, recommend upper and lower endoscopies.  Patient is agreeable, consult order placed.  We will recheck CBC, vitamin B12/folate today.    Orders:  -     Vitamin B12  -     Folate  -     CBC w  AUTO Differential    3. Gas bloat syndrome  Assessment & Plan:  Continue to Use Simethicone 80mg every six hours PRN for symptom management.     Orders:  -     simethicone (Gas-X) 80 MG chewable tablet; Chew 1 tablet Every 6 (Six) Hours As Needed for Flatulence.  Dispense: 30 tablet; Refill: 5    4. Alcohol abuse  -     Vitamin B12  -     Folate    5. Gastroesophageal reflux disease without esophagitis  Assessment & Plan:  Discussed with patient that due to increased risk for Varices with alcohol consumption, Hypertension and history of GERD that he should obtain EGD for further assessment.    Orders:  -     Ambulatory Referral For Screening Colonoscopy    6. Screening for malignant neoplasm of colon  -     Ambulatory Referral For Screening Colonoscopy      Follow Up   Return in about 4 weeks (around 3/7/2023) for Recheck Asthma.  Patient was given instructions and counseling regarding his condition or for health maintenance advice. Please see specific information pulled into the AVS if appropriate.     Patient was also seen by Daylin Taylor, MARY LOU student.    Parts of this note are electronic transcriptions/translations of spoken language to printed text using the Dragon Dictation system.      Rekha Quevedo, RAY  02/07/2023

## 2023-02-07 NOTE — ASSESSMENT & PLAN NOTE
We discussed due to his chronic anemia, alcohol abuse and GERD, recommend upper and lower endoscopies.  Patient is agreeable, consult order placed.  We will recheck CBC, vitamin B12/folate today.

## 2023-03-07 ENCOUNTER — OFFICE VISIT (OUTPATIENT)
Dept: FAMILY MEDICINE CLINIC | Facility: CLINIC | Age: 58
End: 2023-03-07
Payer: MEDICAID

## 2023-03-07 VITALS
WEIGHT: 135.1 LBS | HEIGHT: 60 IN | SYSTOLIC BLOOD PRESSURE: 120 MMHG | OXYGEN SATURATION: 98 % | DIASTOLIC BLOOD PRESSURE: 80 MMHG | HEART RATE: 72 BPM | TEMPERATURE: 98 F | BODY MASS INDEX: 26.52 KG/M2

## 2023-03-07 DIAGNOSIS — R11.2 NAUSEA AND VOMITING, UNSPECIFIED VOMITING TYPE: ICD-10-CM

## 2023-03-07 DIAGNOSIS — J45.40 MODERATE PERSISTENT ASTHMA WITHOUT COMPLICATION: Primary | ICD-10-CM

## 2023-03-07 DIAGNOSIS — F10.10 ALCOHOL ABUSE: ICD-10-CM

## 2023-03-07 DIAGNOSIS — I10 ESSENTIAL HYPERTENSION: ICD-10-CM

## 2023-03-07 PROCEDURE — 99214 OFFICE O/P EST MOD 30 MIN: CPT | Performed by: NURSE PRACTITIONER

## 2023-03-07 RX ORDER — ONDANSETRON 4 MG/1
4 TABLET, ORALLY DISINTEGRATING ORAL EVERY 8 HOURS PRN
Qty: 20 TABLET | Refills: 0 | Status: SHIPPED | OUTPATIENT
Start: 2023-03-07

## 2023-03-07 NOTE — ASSESSMENT & PLAN NOTE
Hypertension is stable on Lisinopril 40mg and Spironolactone 25mg daily..  Continue current medications.  Blood pressure will be reassessed at the next regular appointment.

## 2023-03-07 NOTE — PROGRESS NOTES
"Chief Complaint  Hypertension and Asthma    Subjective          Bentley Martínez, 57 y.o. male presents to [facility]  History of Present Illness   Patient presents today for follow-up for HTN and Asthma.    Hypertension; Patient's BP is currently managed with Lisinopril 40mg daily and Spironolactone 25mg daily. His BP reading today in the office is within normal limits.     Asthma; Last visit, patient was started on Breztri to assist with wheezing and symptom management. Patient states that after four days he was wheezing so bad that he was whistling so he discontinued the Breztri and went back to using his Dulera twice daily and his Spiriva daily. He states within a day the wheezing and whistling stopped. He denies any respiratory discomfort or distress. He has seen Pulmonology in the past.    GERD; patient is currently scheduled for a scope in May. He states that two days ago he was feeling fine and then all of the sudden he began vomiting and had six episodes. He states he couldn't eat or drink that day. He denies any blood in his emesis or within his stool. He denies any abdominal pain or discomfort. He states everything he vomited was stomach acid contents.     He does continue to smoke Marijuana daily and also consumes an average of 6 - 8 beers / day.      Tobacco Use: Medium Risk   • Smoking Tobacco Use: Former   • Smokeless Tobacco Use: Never   • Passive Exposure: Not on file      Objective   Vital Signs:   /80 (BP Location: Left arm, Patient Position: Sitting, Cuff Size: Adult)   Pulse 72   Temp 98 °F (36.7 °C)   Ht 68 cm (26.77\")   Wt 61.3 kg (135 lb 1.6 oz)   SpO2 98%   .54 kg/m²       Current Outpatient Medications:   •  albuterol sulfate  (90 Base) MCG/ACT inhaler, Inhale 2 puffs Every 4 (Four) Hours As Needed for Wheezing or Shortness of Air., Disp: 8 g, Rfl: 5  •  aspirin 81 MG EC tablet, Take 1 tablet by mouth Daily., Disp: 99 tablet, Rfl: " 99  •  cetirizine (zyrTEC) 10 MG tablet, Take 1 tablet by mouth Daily., Disp: 30 tablet, Rfl: 11  •  Dulera 200-5 MCG/ACT inhaler, Inhale 2 puffs 2 (Two) Times a Day., Disp: 1 each, Rfl: 5  •  lisinopril (PRINIVIL,ZESTRIL) 40 MG tablet, Take 1 tablet by mouth Daily for 180 days., Disp: 90 tablet, Rfl: 1  •  lovastatin (MEVACOR) 20 MG tablet, Take 1 tablet by mouth Every Night., Disp: 90 tablet, Rfl: 1  •  montelukast (SINGULAIR) 10 MG tablet, Take 1 tablet by mouth Every Night., Disp: 90 tablet, Rfl: 1  •  pantoprazole (PROTONIX) 40 MG EC tablet, Take 1 tablet by mouth Daily., Disp: 90 tablet, Rfl: 1  •  simethicone (Gas-X) 80 MG chewable tablet, Chew 1 tablet Every 6 (Six) Hours As Needed for Flatulence., Disp: 30 tablet, Rfl: 5  •  Spiriva HandiHaler 18 MCG per inhalation capsule, Place 1 capsule into inhaler and inhale Daily., Disp: 90 capsule, Rfl: 1  •  spironolactone (ALDACTONE) 25 MG tablet, Take 1 tablet by mouth Daily., Disp: 90 tablet, Rfl: 1  •  vitamin B-12 (CYANOCOBALAMIN) 1000 MCG tablet, Take 1 tablet by mouth Daily., Disp: 30 tablet, Rfl: 5  •  ondansetron ODT (ZOFRAN-ODT) 4 MG disintegrating tablet, Place 1 tablet on the tongue Every 8 (Eight) Hours As Needed for Nausea or Vomiting., Disp: 20 tablet, Rfl: 0   Past Medical History:   Diagnosis Date   • Abdominal pain    • Alcohol abuse 01/28/2021   • Allergic rhinitis due to allergen 10/24/2018   • Asthma    • Black stool    • C. difficile diarrhea    • Essential hypertension 10/24/2018   • GERD (gastroesophageal reflux disease) 10/24/2018   • High cholesterol    • History of urinary retention     post op   • Hyperlipidemia 10/24/2018   • Hypertension    • Inguinal hernia    • Laryngeal candidiasis    • Voice hoarseness       Physical Exam  Vitals reviewed.   Constitutional:       Appearance: Normal appearance. He is well-developed.   Neck:      Thyroid: No thyroid mass, thyromegaly or thyroid tenderness.   Cardiovascular:      Rate and Rhythm:  Normal rate and regular rhythm.      Heart sounds: No murmur heard.    No friction rub. No gallop.   Pulmonary:      Effort: Pulmonary effort is normal.      Breath sounds: Normal breath sounds. No wheezing or rhonchi.   Lymphadenopathy:      Cervical: No cervical adenopathy.   Skin:     General: Skin is warm and dry.   Neurological:      Mental Status: He is alert and oriented to person, place, and time.      Cranial Nerves: No cranial nerve deficit.   Psychiatric:         Mood and Affect: Mood and affect normal.         Behavior: Behavior normal.         Thought Content: Thought content normal. Thought content does not include homicidal or suicidal ideation.         Judgment: Judgment normal.        Result Review :   {The following data was reviewed by RAY Britt    CT Chest With Contrast Diagnostic    Result Date: 12/15/2022    CT scan of the chest with IV contrast demonstrating no active disease.     LLOYD IZAGUIRRE MD       Electronically Signed and Approved By: LLOYD IZAGUIRRE MD on 12/15/2022 at 16:10               Common Labs   Common labs    Common Labs 5/29/22 5/29/22 11/3/22 11/3/22 11/3/22 11/3/22 2/7/23    0838 0838 0802 0802 0802 0802    Glucose  88    82    BUN  15    10    Creatinine  1.17    1.11    Sodium  139    136    Potassium  4.2    4.7    Chloride  102    100    Calcium  9.5    9.0    Albumin  5.10    4.70    Total Bilirubin  0.4    0.5    Alkaline Phosphatase  65    55    AST (SGOT)  32    32    ALT (SGPT)  27    16    WBC 6.32   5.49   5.55   Hemoglobin 12.9 (A)   12.3 (A)   12.4 (A)   Hematocrit 38.3   35.7 (A)   36.2 (A)   Platelets 388   361   438   Total Cholesterol     176     Triglycerides     73     HDL Cholesterol     79 (A)     LDL Cholesterol      83     Hemoglobin A1C   5.80 (A)       (A) Abnormal value              Assessment and Plan    Diagnoses and all orders for this visit:    1. Moderate persistent asthma without complication (Primary)  Assessment &  Plan:  Asthma is improving with treatment.  The patient is experiencing no respiratory distress with no noted exacerbations or audible wheezing.. He is experiencing no nighttime asthma symptoms.  Medications: Discontinue use of Breztri. Resume using Dulera 2 puffs twice daily and Spirival 2 puffs daily..        Orders:  -     Ambulatory Referral to Pulmonology    2. Essential hypertension  Assessment & Plan:  Hypertension is stable on Lisinopril 40mg and Spironolactone 25mg daily..  Continue current medications.  Blood pressure will be reassessed at the next regular appointment.      3. Nausea and vomiting, unspecified vomiting type  Comments:  Keep scheduled appoint for EGD / Colonoscopy. Consider decreasing alcohol consumption. Take Zofran as needed for Nausea and Vomiting.  Orders:  -     ondansetron ODT (ZOFRAN-ODT) 4 MG disintegrating tablet; Place 1 tablet on the tongue Every 8 (Eight) Hours As Needed for Nausea or Vomiting.  Dispense: 20 tablet; Refill: 0    4. Alcohol abuse  Assessment & Plan:  Discussed with patient the need to decrease alcohol intake. Patient not currently willing to decrease consumption. Risks of increased alcohol intake reviewed and discussed.  Handout on alcohol abuse and dependence provided, see AVS.        Follow Up   Return in about 3 months (around 6/7/2023) for Keep Scheduled Follow-up HTN, Asthma.  Patient was given instructions and counseling regarding his condition or for health maintenance advice. Please see specific information pulled into the AVS if appropriate.     Parts of this note are electronic transcriptions/translations of spoken language to printed text using the Dragon Dictation system.    I reviewed all the information by my student RAY Saxena student, and I attest that all information is correct.    RAY Britt  03/07/2023

## 2023-03-07 NOTE — ASSESSMENT & PLAN NOTE
Discussed with patient the need to decrease alcohol intake. Patient not currently willing to decrease consumption. Risks of increased alcohol intake reviewed and discussed.  Handout on alcohol abuse and dependence provided, see AVS.

## 2023-03-07 NOTE — ASSESSMENT & PLAN NOTE
Asthma is improving with treatment.  The patient is experiencing no respiratory distress with no noted exacerbations or audible wheezing.. He is experiencing no nighttime asthma symptoms.  Medications: Discontinue use of Breztri. Resume using Dulera 2 puffs twice daily and Spirival 2 puffs daily..

## 2023-04-25 DIAGNOSIS — J45.20 MILD INTERMITTENT ASTHMA WITHOUT COMPLICATION: ICD-10-CM

## 2023-04-25 RX ORDER — MOMETASONE FUROATE AND FORMOTEROL FUMARATE DIHYDRATE 200; 5 UG/1; UG/1
2 AEROSOL RESPIRATORY (INHALATION)
Qty: 1 EACH | Refills: 0 | Status: SHIPPED | OUTPATIENT
Start: 2023-04-25

## 2023-05-16 ENCOUNTER — OFFICE VISIT (OUTPATIENT)
Dept: SURGERY | Facility: CLINIC | Age: 58
End: 2023-05-16
Payer: MEDICAID

## 2023-05-16 ENCOUNTER — PREP FOR SURGERY (OUTPATIENT)
Dept: OTHER | Facility: HOSPITAL | Age: 58
End: 2023-05-16
Payer: MEDICAID

## 2023-05-16 VITALS — HEART RATE: 68 BPM | BODY MASS INDEX: 20.31 KG/M2 | HEIGHT: 68 IN | WEIGHT: 134 LBS

## 2023-05-16 DIAGNOSIS — R19.7 DIARRHEA, UNSPECIFIED TYPE: Primary | ICD-10-CM

## 2023-05-16 DIAGNOSIS — R19.7 DIARRHEA: Primary | ICD-10-CM

## 2023-05-16 RX ORDER — SODIUM CHLORIDE 9 MG/ML
40 INJECTION, SOLUTION INTRAVENOUS AS NEEDED
OUTPATIENT
Start: 2023-05-16

## 2023-05-16 RX ORDER — SODIUM CHLORIDE 0.9 % (FLUSH) 0.9 %
3 SYRINGE (ML) INJECTION EVERY 12 HOURS SCHEDULED
OUTPATIENT
Start: 2023-05-16

## 2023-05-16 RX ORDER — SODIUM CHLORIDE 0.9 % (FLUSH) 0.9 %
10 SYRINGE (ML) INJECTION AS NEEDED
OUTPATIENT
Start: 2023-05-16

## 2023-05-16 NOTE — PROGRESS NOTES
"Chief Complaint: Colonoscopy and EGD (consult)    Subjective      Colonoscopy consultation       History of Present Illness  Bentley Martínez is a 57 y.o. male presents to [facility] for colonoscopy consultation.    Patient presents today on referral from Rekha Noe for colonoscopy consultation.  Patient reports he is having some diarrhea issues times the last 3 to 4 months.  He denies any rectal bleeding.  No previous colonoscopy.  Denies any family history of colorectal cancer         Objective     Past Medical History:   Diagnosis Date   • Abdominal pain    • Alcohol abuse 01/28/2021   • Allergic rhinitis due to allergen 10/24/2018   • Asthma    • Black stool    • C. difficile diarrhea    • Essential hypertension 10/24/2018   • GERD (gastroesophageal reflux disease) 10/24/2018   • High cholesterol    • History of urinary retention     post op   • Hyperlipidemia 10/24/2018   • Hypertension    • Inguinal hernia    • Laryngeal candidiasis    • Voice hoarseness        Past Surgical History:   Procedure Laterality Date   • CYST REMOVAL      Back of neck   • HERNIA REPAIR      \"30 years ago\"   • INGUINAL HERNIA REPAIR Right 8/11/2021    Procedure: INGUINAL HERNIA REPAIR LAPAROSCOPIC WITH DAVINCI ROBOT;  Surgeon: Orlando Leon MD;  Location: [facility] OR;  Service: Robotics - DaVinci;  Laterality: Right;       Outpatient Medications Marked as Taking for the 5/16/23 encounter (Office Visit) with Dhara Gavin APRN   Medication Sig Dispense Refill   • albuterol sulfate  (90 Base) MCG/ACT inhaler Inhale 2 puffs Every 4 (Four) Hours As Needed for Wheezing or Shortness of Air. 8 g 5   • aspirin 81 MG EC tablet Take 1 tablet by mouth Daily. 99 tablet 99   • cetirizine (zyrTEC) 10 MG tablet Take 1 tablet by mouth Daily. 30 tablet 11   • Dulera 200-5 MCG/ACT inhaler Inhale 2 puffs 2 (Two) Times a Day. 1 each 0   • lovastatin (MEVACOR) 20 MG tablet Take 1 tablet by mouth " "Every Night. 90 tablet 1   • montelukast (SINGULAIR) 10 MG tablet Take 1 tablet by mouth Every Night. 90 tablet 1   • ondansetron ODT (ZOFRAN-ODT) 4 MG disintegrating tablet Place 1 tablet on the tongue Every 8 (Eight) Hours As Needed for Nausea or Vomiting. 20 tablet 0   • pantoprazole (PROTONIX) 40 MG EC tablet Take 1 tablet by mouth Daily. 90 tablet 1   • simethicone (Gas-X) 80 MG chewable tablet Chew 1 tablet Every 6 (Six) Hours As Needed for Flatulence. 30 tablet 5   • Spiriva HandiHaler 18 MCG per inhalation capsule Place 1 capsule into inhaler and inhale Daily. 90 capsule 1   • spironolactone (ALDACTONE) 25 MG tablet Take 1 tablet by mouth Daily. 90 tablet 1   • vitamin B-12 (CYANOCOBALAMIN) 1000 MCG tablet Take 1 tablet by mouth Daily. 30 tablet 5       No Known Allergies     Family History   Problem Relation Age of Onset   • Heart disease Mother    • Heart disease Father    • Heart disease Brother        Social History     Socioeconomic History   • Marital status: Single   Tobacco Use   • Smoking status: Former     Packs/day: 1.50     Years: 12.00     Pack years: 18.00     Types: Cigarettes     Quit date:      Years since quittin.3   • Smokeless tobacco: Never   Vaping Use   • Vaping Use: Never used   Substance and Sexual Activity   • Alcohol use: Yes     Alcohol/week: 6.0 - 8.0 standard drinks     Types: 6 - 8 Cans of beer per week     Comment: Current every day   • Drug use: Yes     Types: Marijuana     Comment: USED TWO DAYS AGO   • Sexual activity: Defer       Review of Systems   Constitutional: Negative for chills.   Gastrointestinal: Negative for abdominal distention, abdominal pain, anal bleeding, blood in stool, constipation, diarrhea and rectal pain.        Vital Signs:   Pulse 68   Ht 172.7 cm (68\")   Wt 60.8 kg (134 lb)   BMI 20.37 kg/m²      Physical Exam  Vitals and nursing note reviewed.   Constitutional:       General: He is not in acute distress.     Appearance: Normal " appearance.   HENT:      Head: Normocephalic.   Cardiovascular:      Rate and Rhythm: Normal rate.   Pulmonary:      Effort: Pulmonary effort is normal.      Breath sounds: No stridor.   Abdominal:      Palpations: Abdomen is soft.      Tenderness: There is no guarding.   Musculoskeletal:         General: No deformity. Normal range of motion.   Skin:     General: Skin is warm and dry.      Coloration: Skin is not jaundiced.   Neurological:      General: No focal deficit present.      Mental Status: He is alert and oriented to person, place, and time.   Psychiatric:         Mood and Affect: Mood normal.         Thought Content: Thought content normal.          Result Review :          []  Laboratory  []  Radiology  []  Pathology  []  Microbiology  []  EKG/Telemetry   []  Cardiology/Vascular   []  Old records  I spent 15 minutes caring for Bentley on this date of service. This time includes time spent by me in the following activities: reviewing tests, obtaining and/or reviewing a separately obtained history, performing a medically appropriate examination and/or evaluation, ordering medications, tests, or procedures, and documenting information in the medical record.     Assessment and Plan    Diagnoses and all orders for this visit:    1. Diarrhea, unspecified type (Primary)        Follow Up   Return for Schedule colonoscopy with Dr. Leon on 10/30/2023 at [facility].     Hospital arrival time: 0700.    Possible risks/complications, benefits, and alternatives to surgical or invasive procedure have been explained to patient and/or legal guardian.     Patient has been evaluated and can tolerate anesthesia and/or sedation. Risks, benefits, and alternatives to anesthesia and sedation have been explained to patient and/or legal guardian.  Patient verbalizes understanding and is willing to proceed with above plan.    Patient was given instructions and counseling regarding his condition or for health  maintenance advice. Please see specific information pulled into the AVS if appropriate.

## 2023-05-22 DIAGNOSIS — I10 ESSENTIAL HYPERTENSION: ICD-10-CM

## 2023-05-22 RX ORDER — LISINOPRIL 40 MG/1
TABLET ORAL
Qty: 30 TABLET | Refills: 1 | Status: SHIPPED | OUTPATIENT
Start: 2023-05-22

## 2023-05-23 DIAGNOSIS — K92.89 GAS BLOAT SYNDROME: ICD-10-CM

## 2023-05-23 DIAGNOSIS — J45.20 MILD INTERMITTENT ASTHMA WITHOUT COMPLICATION: ICD-10-CM

## 2023-05-23 DIAGNOSIS — K21.9 GASTROESOPHAGEAL REFLUX DISEASE WITHOUT ESOPHAGITIS: ICD-10-CM

## 2023-05-23 DIAGNOSIS — J30.9 ALLERGIC RHINITIS, UNSPECIFIED SEASONALITY, UNSPECIFIED TRIGGER: ICD-10-CM

## 2023-05-23 DIAGNOSIS — I10 ESSENTIAL HYPERTENSION: ICD-10-CM

## 2023-05-23 DIAGNOSIS — E78.2 MIXED HYPERLIPIDEMIA: ICD-10-CM

## 2023-05-23 RX ORDER — TIOTROPIUM BROMIDE 18 UG/1
CAPSULE ORAL; RESPIRATORY (INHALATION)
Qty: 30 CAPSULE | Refills: 0 | Status: SHIPPED | OUTPATIENT
Start: 2023-05-23

## 2023-05-23 RX ORDER — SPIRONOLACTONE 25 MG/1
TABLET ORAL
Qty: 30 TABLET | Refills: 0 | Status: SHIPPED | OUTPATIENT
Start: 2023-05-23

## 2023-05-23 RX ORDER — PANTOPRAZOLE SODIUM 40 MG/1
TABLET, DELAYED RELEASE ORAL
Qty: 30 TABLET | Refills: 0 | Status: SHIPPED | OUTPATIENT
Start: 2023-05-23

## 2023-05-23 RX ORDER — MONTELUKAST SODIUM 10 MG/1
TABLET ORAL
Qty: 30 TABLET | Refills: 0 | Status: SHIPPED | OUTPATIENT
Start: 2023-05-23

## 2023-05-23 RX ORDER — LOVASTATIN 20 MG/1
TABLET ORAL
Qty: 30 TABLET | Refills: 0 | Status: SHIPPED | OUTPATIENT
Start: 2023-05-23

## 2023-05-26 ENCOUNTER — TELEPHONE (OUTPATIENT)
Dept: PULMONOLOGY | Facility: CLINIC | Age: 58
End: 2023-05-26
Payer: MEDICAID

## 2023-06-07 NOTE — PROGRESS NOTES
"Chief Complaint  Asthma, Hypertension, and Hyperlipidemia    Subjective          Bentley Martínez, 57 y.o. male presents to [facility]  History of Present Illness   Patient presents today for follow-up for HTN and Asthma.    Hypertension; Patient's BP is currently managed with Lisinopril 40mg daily and Spironolactone 25mg daily. His BP reading today in the office is within normal limits.     Hyperlipidemia, currently on lovastatin 20 mg daily.    Asthma: Patient has been having increase of asthma symptoms the last few weeks.  Previously we tried to switch him to Breztri inhaler but he did not tolerate this inhaler and went back to using Dulera twice daily and his Spiriva daily. He is also on Singulair nightly.  He does not tolerate the albuterol in the nebulizer, states it makes him shaky, but he is able to tolerate the albuterol inhaler.     GERD: He states he has improved and has not had any more vomiting.  He takes Zofran as needed.  He is on pantoprazole 40 mg daily.  He has anemia.  He has not had an upper endoscopy.  He is scheduled for a colonoscopy in October.    He does continue to smoke Marijuana daily and also consumes an average of 6 - 8 beers / day.        Tobacco Use: Medium Risk    Smoking Tobacco Use: Former    Smokeless Tobacco Use: Never    Passive Exposure: Not on file      Objective   Vital Signs:   /72   Pulse 75   Temp 96.5 °F (35.8 °C)   Ht 172.7 cm (68\")   Wt 60.9 kg (134 lb 4.8 oz)   SpO2 98%   BMI 20.42 kg/m²       Current Outpatient Medications:     albuterol sulfate  (90 Base) MCG/ACT inhaler, Inhale 2 puffs Every 4 (Four) Hours As Needed for Wheezing or Shortness of Air., Disp: 8 g, Rfl: 5    aspirin 81 MG EC tablet, Take 1 tablet by mouth Daily., Disp: 99 tablet, Rfl: 99    cetirizine (zyrTEC) 10 MG tablet, Take 1 tablet by mouth Daily., Disp: 30 tablet, Rfl: 11    Dulera 200-5 MCG/ACT inhaler, Inhale 2 puffs 2 (Two) Times a Day., Disp: " 13 g, Rfl: 5    lisinopril (PRINIVIL,ZESTRIL) 40 MG tablet, Take 1 tablet by mouth Daily., Disp: 30 tablet, Rfl: 5    lovastatin (MEVACOR) 20 MG tablet, Take 1 tablet by mouth every night at bedtime., Disp: 30 tablet, Rfl: 5    montelukast (SINGULAIR) 10 MG tablet, Take 1 tablet by mouth every night at bedtime., Disp: 30 tablet, Rfl: 5    ondansetron ODT (ZOFRAN-ODT) 4 MG disintegrating tablet, Place 1 tablet on the tongue Every 8 (Eight) Hours As Needed for Nausea or Vomiting., Disp: 20 tablet, Rfl: 0    pantoprazole (PROTONIX) 40 MG EC tablet, Take 1 tablet by mouth Daily., Disp: 30 tablet, Rfl: 5    simethicone (Gas-X) 80 MG chewable tablet, Chew 1 tablet Every 6 (Six) Hours As Needed for Flatulence., Disp: 30 tablet, Rfl: 5    Spiriva HandiHaler 18 MCG per inhalation capsule, Place 1 capsule into inhaler and inhale Daily., Disp: 30 capsule, Rfl: 5    spironolactone (ALDACTONE) 25 MG tablet, Take 1 tablet by mouth Daily., Disp: 30 tablet, Rfl: 0    vitamin B-12 (CYANOCOBALAMIN) 1000 MCG tablet, Take 1 tablet by mouth Daily., Disp: 30 tablet, Rfl: 5    predniSONE (DELTASONE) 20 MG tablet, Take 3 tablets by mouth Daily for 2 days, THEN 2 tablets Daily for 2 days, THEN 1 tablet Daily for 2 days., Disp: 12 tablet, Rfl: 0   Past Medical History:   Diagnosis Date    Abdominal pain     Alcohol abuse 01/28/2021    Allergic rhinitis due to allergen 10/24/2018    Asthma     Black stool     C. difficile diarrhea     Essential hypertension 10/24/2018    GERD (gastroesophageal reflux disease) 10/24/2018    High cholesterol     History of urinary retention     post op    Hyperlipidemia 10/24/2018    Hypertension     Inguinal hernia     Laryngeal candidiasis     Voice hoarseness       Physical Exam  Vitals reviewed.   Constitutional:       Appearance: Normal appearance. He is well-developed.   Neck:      Thyroid: No thyroid mass, thyromegaly or thyroid tenderness.   Cardiovascular:      Rate and Rhythm: Normal rate and regular  rhythm.      Heart sounds: No murmur heard.    No friction rub. No gallop.   Pulmonary:      Effort: Pulmonary effort is normal.      Breath sounds: Normal breath sounds. No wheezing or rhonchi.   Lymphadenopathy:      Cervical: No cervical adenopathy.   Skin:     General: Skin is warm and dry.   Neurological:      Mental Status: He is alert and oriented to person, place, and time.      Cranial Nerves: No cranial nerve deficit.   Psychiatric:         Mood and Affect: Mood and affect normal.         Behavior: Behavior normal.         Thought Content: Thought content normal. Thought content does not include homicidal or suicidal ideation.         Judgment: Judgment normal.      Result Review :   {The following data was reviewed by RAY Britt    No Images in the past 120 days found..    Common Labs   Common labs          11/3/2022    08:02 2/7/2023    07:42 6/8/2023    08:13   Common Labs   Glucose 82   84    BUN 10   11    Creatinine 1.11   0.95    Sodium 136   141    Potassium 4.7   4.5    Chloride 100   104    Calcium 9.0   9.9    Albumin 4.70   4.8    Total Bilirubin 0.5   <0.2    Alkaline Phosphatase 55   52    AST (SGOT) 32   21    ALT (SGPT) 16   19    WBC 5.49  5.55  4.80    Hemoglobin 12.3  12.4  13.6    Hematocrit 35.7  36.2  41.0    Platelets 361  438  361    Total Cholesterol 176   197    Triglycerides 73   173    HDL Cholesterol 79   77    LDL Cholesterol  83   91    Hemoglobin A1C 5.80        FREET4   Lab Results   Component Value Date    FREET4 1.0 05/08/2019     VITB12   Lab Results   Component Value Date    ZVTMIXMG24 997 (H) 06/08/2023     IRON    Iron   Date Value Ref Range Status   11/03/2022 139 59 - 158 mcg/dL Final     Iron Saturation (TSAT)   Date Value Ref Range Status   11/03/2022 33 20 - 50 % Final     Transferrin   Date Value Ref Range Status   11/03/2022 280 200 - 360 mg/dL Final     TIBC   Date Value Ref Range Status   11/03/2022 417 298 - 536 mcg/dL Final             Assessment and Plan    Diagnoses and all orders for this visit:    1. Moderate persistent asthma with acute exacerbation (Primary)  Assessment & Plan:  Asthma is worsening.  The patient is experiencing frequent daytime asthma symptoms. He is experiencing frequent nighttime asthma symptoms.  Asthma information handout given.  Discussed monitoring symptoms and use of quick-relief medications and contacting us early in the course of exacerbations.  Warning signs of respiratory distress were reviewed with the patient.   Medications: continue Singulair, Dulera and Spiriva as prescribed, albuterol inhaler as needed and begin start prednisone Dosepak.  Follow up in 3 months, or sooner should new symptoms or problems arise.        Orders:  -     albuterol sulfate  (90 Base) MCG/ACT inhaler; Inhale 2 puffs Every 4 (Four) Hours As Needed for Wheezing or Shortness of Air.  Dispense: 8 g; Refill: 5  -     Dulera 200-5 MCG/ACT inhaler; Inhale 2 puffs 2 (Two) Times a Day.  Dispense: 13 g; Refill: 5  -     montelukast (SINGULAIR) 10 MG tablet; Take 1 tablet by mouth every night at bedtime.  Dispense: 30 tablet; Refill: 5  -     Spiriva HandiHaler 18 MCG per inhalation capsule; Place 1 capsule into inhaler and inhale Daily.  Dispense: 30 capsule; Refill: 5  -     predniSONE (DELTASONE) 20 MG tablet; Take 3 tablets by mouth Daily for 2 days, THEN 2 tablets Daily for 2 days, THEN 1 tablet Daily for 2 days.  Dispense: 12 tablet; Refill: 0    2. Essential hypertension  Assessment & Plan:  Hypertension is improving with treatment.  Continue current treatment regimen.  Continue current medications.  Blood pressure will be reassessed in 3 months.    Orders:  -     lisinopril (PRINIVIL,ZESTRIL) 40 MG tablet; Take 1 tablet by mouth Daily.  Dispense: 30 tablet; Refill: 5  -     spironolactone (ALDACTONE) 25 MG tablet; Take 1 tablet by mouth Daily.  Dispense: 30 tablet; Refill: 0  -     CBC Auto Differential  -     Comprehensive  Metabolic Panel  -     TSH Rfx On Abnormal To Free T4    3. Mixed hyperlipidemia  Assessment & Plan:  Lipid abnormalities are improving with treatment.  Pharmacotherapy as ordered.  Fasting lipid panel today.  Lipids will be reassessed in 6 months.    Orders:  -     lovastatin (MEVACOR) 20 MG tablet; Take 1 tablet by mouth every night at bedtime.  Dispense: 30 tablet; Refill: 5  -     Comprehensive Metabolic Panel  -     Lipid Panel  -     TSH Rfx On Abnormal To Free T4    4. Allergic rhinitis, unspecified seasonality, unspecified trigger  Assessment & Plan:  Stable on Singulair for allergies, will continue current dose.    Orders:  -     montelukast (SINGULAIR) 10 MG tablet; Take 1 tablet by mouth every night at bedtime.  Dispense: 30 tablet; Refill: 5    5. Gastroesophageal reflux disease without esophagitis  Assessment & Plan:  Recommend upper endoscopy, order placed.    Orders:  -     pantoprazole (PROTONIX) 40 MG EC tablet; Take 1 tablet by mouth Daily.  Dispense: 30 tablet; Refill: 5  -     Ambulatory referral for Screening EGD    6. Gas bloat syndrome  Assessment & Plan:  We will continue simethicone as needed.    Orders:  -     pantoprazole (PROTONIX) 40 MG EC tablet; Take 1 tablet by mouth Daily.  Dispense: 30 tablet; Refill: 5  -     simethicone (Gas-X) 80 MG chewable tablet; Chew 1 tablet Every 6 (Six) Hours As Needed for Flatulence.  Dispense: 30 tablet; Refill: 5  -     Ambulatory referral for Screening EGD    7. Low vitamin B12 level  Assessment & Plan:  On vitamin B12, will check vitamin B12 level today.    Orders:  -     CBC Auto Differential  -     Vitamin B12    8. Anemia, unspecified type  Assessment & Plan:  Rechecking labs today.  I will put in referral also for him to have an upper EGD when he gets his colonoscopy.    Orders:  -     CBC Auto Differential  -     Vitamin B12    9. Alcohol abuse  Assessment & Plan:  Continued to encourage patient to decrease alcohol consumption.  Patient has not  interested in quitting.  Handout on alcohol abuse provided, see AVS.          Follow Up   Return in about 3 months (around 9/8/2023) for Annual physical and Next scheduled follow up HTN, HLD, Asthma, GERD.  Patient was given instructions and counseling regarding his condition or for health maintenance advice. Please see specific information pulled into the AVS if appropriate.     Parts of this note are electronic transcriptions/translations of spoken language to printed text using the Dragon Dictation system.      RAY Britt  06/05/2023

## 2023-06-08 ENCOUNTER — OFFICE VISIT (OUTPATIENT)
Dept: FAMILY MEDICINE CLINIC | Facility: CLINIC | Age: 58
End: 2023-06-08
Payer: MEDICAID

## 2023-06-08 VITALS
SYSTOLIC BLOOD PRESSURE: 138 MMHG | TEMPERATURE: 96.5 F | HEART RATE: 75 BPM | BODY MASS INDEX: 20.35 KG/M2 | OXYGEN SATURATION: 98 % | HEIGHT: 68 IN | DIASTOLIC BLOOD PRESSURE: 72 MMHG | WEIGHT: 134.3 LBS

## 2023-06-08 DIAGNOSIS — J45.41 MODERATE PERSISTENT ASTHMA WITH ACUTE EXACERBATION: Primary | ICD-10-CM

## 2023-06-08 DIAGNOSIS — K92.89 GAS BLOAT SYNDROME: ICD-10-CM

## 2023-06-08 DIAGNOSIS — F10.10 ALCOHOL ABUSE: ICD-10-CM

## 2023-06-08 DIAGNOSIS — I10 ESSENTIAL HYPERTENSION: ICD-10-CM

## 2023-06-08 DIAGNOSIS — E53.8 LOW VITAMIN B12 LEVEL: ICD-10-CM

## 2023-06-08 DIAGNOSIS — E78.2 MIXED HYPERLIPIDEMIA: ICD-10-CM

## 2023-06-08 DIAGNOSIS — J30.9 ALLERGIC RHINITIS, UNSPECIFIED SEASONALITY, UNSPECIFIED TRIGGER: ICD-10-CM

## 2023-06-08 DIAGNOSIS — K21.9 GASTROESOPHAGEAL REFLUX DISEASE WITHOUT ESOPHAGITIS: ICD-10-CM

## 2023-06-08 DIAGNOSIS — D64.9 ANEMIA, UNSPECIFIED TYPE: ICD-10-CM

## 2023-06-08 PROBLEM — R79.89 LOW VITAMIN B12 LEVEL: Status: ACTIVE | Noted: 2023-06-08

## 2023-06-08 LAB
ALBUMIN SERPL-MCNC: 4.8 G/DL (ref 3.5–5.2)
ALBUMIN/GLOB SERPL: 1.8 G/DL
ALP SERPL-CCNC: 52 U/L (ref 39–117)
ALT SERPL W P-5'-P-CCNC: 19 U/L (ref 1–41)
ANION GAP SERPL CALCULATED.3IONS-SCNC: 10.4 MMOL/L (ref 5–15)
AST SERPL-CCNC: 21 U/L (ref 1–40)
BASOPHILS # BLD AUTO: 0.05 10*3/MM3 (ref 0–0.2)
BASOPHILS NFR BLD AUTO: 1 % (ref 0–1.5)
BILIRUB SERPL-MCNC: <0.2 MG/DL (ref 0–1.2)
BUN SERPL-MCNC: 11 MG/DL (ref 6–20)
BUN/CREAT SERPL: 11.6 (ref 7–25)
CALCIUM SPEC-SCNC: 9.9 MG/DL (ref 8.6–10.5)
CHLORIDE SERPL-SCNC: 104 MMOL/L (ref 98–107)
CHOLEST SERPL-MCNC: 197 MG/DL (ref 0–200)
CO2 SERPL-SCNC: 26.6 MMOL/L (ref 22–29)
CREAT SERPL-MCNC: 0.95 MG/DL (ref 0.76–1.27)
DEPRECATED RDW RBC AUTO: 45.3 FL (ref 37–54)
EGFRCR SERPLBLD CKD-EPI 2021: 92.8 ML/MIN/1.73
EOSINOPHIL # BLD AUTO: 0.49 10*3/MM3 (ref 0–0.4)
EOSINOPHIL NFR BLD AUTO: 10.2 % (ref 0.3–6.2)
ERYTHROCYTE [DISTWIDTH] IN BLOOD BY AUTOMATED COUNT: 13.3 % (ref 12.3–15.4)
GLOBULIN UR ELPH-MCNC: 2.6 GM/DL
GLUCOSE SERPL-MCNC: 84 MG/DL (ref 65–99)
HCT VFR BLD AUTO: 41 % (ref 37.5–51)
HDLC SERPL-MCNC: 77 MG/DL (ref 40–60)
HGB BLD-MCNC: 13.6 G/DL (ref 13–17.7)
IMM GRANULOCYTES # BLD AUTO: 0.01 10*3/MM3 (ref 0–0.05)
IMM GRANULOCYTES NFR BLD AUTO: 0.2 % (ref 0–0.5)
LDLC SERPL CALC-MCNC: 91 MG/DL (ref 0–100)
LDLC/HDLC SERPL: 1.11 {RATIO}
LYMPHOCYTES # BLD AUTO: 1.51 10*3/MM3 (ref 0.7–3.1)
LYMPHOCYTES NFR BLD AUTO: 31.5 % (ref 19.6–45.3)
MCH RBC QN AUTO: 31.1 PG (ref 26.6–33)
MCHC RBC AUTO-ENTMCNC: 33.2 G/DL (ref 31.5–35.7)
MCV RBC AUTO: 93.6 FL (ref 79–97)
MONOCYTES # BLD AUTO: 0.52 10*3/MM3 (ref 0.1–0.9)
MONOCYTES NFR BLD AUTO: 10.8 % (ref 5–12)
NEUTROPHILS NFR BLD AUTO: 2.22 10*3/MM3 (ref 1.7–7)
NEUTROPHILS NFR BLD AUTO: 46.3 % (ref 42.7–76)
NRBC BLD AUTO-RTO: 0 /100 WBC (ref 0–0.2)
PLATELET # BLD AUTO: 361 10*3/MM3 (ref 140–450)
PMV BLD AUTO: 9.5 FL (ref 6–12)
POTASSIUM SERPL-SCNC: 4.5 MMOL/L (ref 3.5–5.2)
PROT SERPL-MCNC: 7.4 G/DL (ref 6–8.5)
RBC # BLD AUTO: 4.38 10*6/MM3 (ref 4.14–5.8)
SODIUM SERPL-SCNC: 141 MMOL/L (ref 136–145)
TRIGL SERPL-MCNC: 173 MG/DL (ref 0–150)
TSH SERPL DL<=0.05 MIU/L-ACNC: 1.57 UIU/ML (ref 0.27–4.2)
VIT B12 BLD-MCNC: 997 PG/ML (ref 211–946)
VLDLC SERPL-MCNC: 29 MG/DL (ref 5–40)
WBC NRBC COR # BLD: 4.8 10*3/MM3 (ref 3.4–10.8)

## 2023-06-08 PROCEDURE — 99214 OFFICE O/P EST MOD 30 MIN: CPT | Performed by: NURSE PRACTITIONER

## 2023-06-08 PROCEDURE — 1160F RVW MEDS BY RX/DR IN RCRD: CPT | Performed by: NURSE PRACTITIONER

## 2023-06-08 PROCEDURE — 80050 GENERAL HEALTH PANEL: CPT | Performed by: NURSE PRACTITIONER

## 2023-06-08 PROCEDURE — 3075F SYST BP GE 130 - 139MM HG: CPT | Performed by: NURSE PRACTITIONER

## 2023-06-08 PROCEDURE — 80061 LIPID PANEL: CPT | Performed by: NURSE PRACTITIONER

## 2023-06-08 PROCEDURE — 82607 VITAMIN B-12: CPT | Performed by: NURSE PRACTITIONER

## 2023-06-08 PROCEDURE — 1159F MED LIST DOCD IN RCRD: CPT | Performed by: NURSE PRACTITIONER

## 2023-06-08 PROCEDURE — 3078F DIAST BP <80 MM HG: CPT | Performed by: NURSE PRACTITIONER

## 2023-06-08 RX ORDER — MOMETASONE FUROATE AND FORMOTEROL FUMARATE DIHYDRATE 200; 5 UG/1; UG/1
2 AEROSOL RESPIRATORY (INHALATION)
Qty: 13 G | Refills: 5 | Status: SHIPPED | OUTPATIENT
Start: 2023-06-08

## 2023-06-08 RX ORDER — MONTELUKAST SODIUM 10 MG/1
10 TABLET ORAL
Qty: 30 TABLET | Refills: 5 | Status: SHIPPED | OUTPATIENT
Start: 2023-06-08

## 2023-06-08 RX ORDER — LOVASTATIN 20 MG/1
20 TABLET ORAL
Qty: 30 TABLET | Refills: 5 | Status: SHIPPED | OUTPATIENT
Start: 2023-06-08

## 2023-06-08 RX ORDER — LISINOPRIL 40 MG/1
40 TABLET ORAL DAILY
Qty: 30 TABLET | Refills: 5 | Status: SHIPPED | OUTPATIENT
Start: 2023-06-08

## 2023-06-08 RX ORDER — PANTOPRAZOLE SODIUM 40 MG/1
40 TABLET, DELAYED RELEASE ORAL DAILY
Qty: 30 TABLET | Refills: 5 | Status: SHIPPED | OUTPATIENT
Start: 2023-06-08

## 2023-06-08 RX ORDER — PREDNISONE 20 MG/1
TABLET ORAL
Qty: 12 TABLET | Refills: 0 | Status: SHIPPED | OUTPATIENT
Start: 2023-06-08 | End: 2023-06-14

## 2023-06-08 RX ORDER — SPIRONOLACTONE 25 MG/1
25 TABLET ORAL DAILY
Qty: 30 TABLET | Refills: 0 | Status: SHIPPED | OUTPATIENT
Start: 2023-06-08

## 2023-06-08 RX ORDER — TIOTROPIUM BROMIDE 18 UG/1
1 CAPSULE ORAL; RESPIRATORY (INHALATION) DAILY
Qty: 30 CAPSULE | Refills: 5 | Status: SHIPPED | OUTPATIENT
Start: 2023-06-08

## 2023-06-08 RX ORDER — ALBUTEROL SULFATE 90 UG/1
2 AEROSOL, METERED RESPIRATORY (INHALATION) EVERY 4 HOURS PRN
Qty: 8 G | Refills: 5 | Status: SHIPPED | OUTPATIENT
Start: 2023-06-08

## 2023-06-08 RX ORDER — LANOLIN ALCOHOL/MO/W.PET/CERES
1000 CREAM (GRAM) TOPICAL DAILY
Qty: 30 TABLET | Refills: 5 | Status: CANCELLED | OUTPATIENT
Start: 2023-06-08

## 2023-06-08 RX ORDER — SIMETHICONE 80 MG
80 TABLET,CHEWABLE ORAL EVERY 6 HOURS PRN
Qty: 30 TABLET | Refills: 5 | Status: SHIPPED | OUTPATIENT
Start: 2023-06-08

## 2023-06-08 NOTE — ASSESSMENT & PLAN NOTE
Rechecking labs today.  I will put in referral also for him to have an upper EGD when he gets his colonoscopy.

## 2023-06-08 NOTE — ASSESSMENT & PLAN NOTE
Continued to encourage patient to decrease alcohol consumption.  Patient has not interested in quitting.  Handout on alcohol abuse provided, see AVS.

## 2023-06-08 NOTE — ASSESSMENT & PLAN NOTE
Hypertension is improving with treatment.  Continue current treatment regimen.  Continue current medications.  Blood pressure will be reassessed in 3 months.   Procedure:  Left TKR  POD #: 2  S: Pt without complaints. No SOB,CP, N/V. Tolerated Diet well.   Pain comfortable  on  Interval Rx.   No BM yet + flatus, No abdominal pain.   PT activity yesterday:Walked with walker 75'  Pain Rx:  acetaminophen   Tablet. 1000 milliGRAM(s) Oral every 8 hours  clonazePAM Tablet 0.5 milliGRAM(s) Oral at bedtime PRN  HYDROmorphone  Injectable 0.5 milliGRAM(s) IV Push every 3 hours PRN  ondansetron Injectable 4 milliGRAM(s) IV Push every 6 hours PRN  oxyCODONE    IR 5 milliGRAM(s) Oral every 3 hours PRN  oxyCODONE    IR 10 milliGRAM(s) Oral every 3 hours PRN    O: General: On exam, No Apparent Distress  Vital Signs Last 24 Hrs  T(C): 37.1 (01 Jun 2018 07:54), Max: 37.2 (31 May 2018 15:00)  T(F): 98.7 (01 Jun 2018 07:54), Max: 99 (31 May 2018 19:00)  HR: 102 (01 Jun 2018 07:54) (80 - 104)  BP: 131/83 (01 Jun 2018 07:54) (124/83 - 143/78)  BP(mean): --  RR: 18 (01 Jun 2018 07:54) (16 - 18)  SpO2: 98% (01 Jun 2018 07:54) (94% - 98      ExtKnee: Left Knee Arrington dressing clean, dry, & intact, changed. 4x4 and ace applied for compression.  Prineo in place  Minimal soft tissue swelling;   ROM: Extension 10 deg. ; Flexion 680deg. PROM  CPM = 0 deg flexion[Increased to 50 deg, tolerated well  Neurologic:  Has sensation over feet & toes bilat. Full AROM bilat feet & toes. EHL/AT = 5/5  Vascular: Feet toes warm, pink. DP = 2+ No calf tenderness bilat.  VTEP: On Bilat. Venodynes + aspirin enteric coated 162 milliGRAM(s) Oral every 12 hours    I&O's Detail    31 May 2018 07:01  -  01 Jun 2018 07:00  --------------------------------------------------------  IN:  Total IN: 0 mL    OUT:    Voided: 2000 mL  Total OUT: 2000 mL    Total NET: -2000 mL    Hospitalist input noted.  Labs Today: CBC pending today.                        14.7   8.3   )-----------( 185      ( 31 May 2018 06:25 )             41.8       06-01    137  |  102  |  16  ----------------------------<  163<H>  4.1   |  26  |  1.32<H>    Ca    9.1      01 Jun 2018 06:47        Primary Orthopedic Assessment:  • Stable from Orthopedic perspective  • Neuro motor exam stable  • Labs: CBC / Chem stable-- mild ckd      Plan:   • Continue:  PT/OT/WBAT with assistance of a walker/Ice to knee/ Knee ROM         Incentive spirometry encouraged   • Continue DVT prophylaxis as prescribed, including use of compression devices and ankle pumps  • Continue Pain Rx  • Plans per Medicine / Anesthesia / Cardiology  • Discharge planning – anticipated discharge is Home D/C with home care & home PT  when medically stable & cleared by PT/OT-- today.

## 2023-06-08 NOTE — ASSESSMENT & PLAN NOTE
Asthma is worsening.  The patient is experiencing frequent daytime asthma symptoms. He is experiencing frequent nighttime asthma symptoms.  Asthma information handout given.  Discussed monitoring symptoms and use of quick-relief medications and contacting us early in the course of exacerbations.  Warning signs of respiratory distress were reviewed with the patient.   Medications: continue Singulair, Dulera and Spiriva as prescribed, albuterol inhaler as needed and begin start prednisone Dosepak.  Follow up in 3 months, or sooner should new symptoms or problems arise.

## 2023-06-14 ENCOUNTER — PREP FOR SURGERY (OUTPATIENT)
Dept: SURGERY | Facility: CLINIC | Age: 58
End: 2023-06-14
Payer: MEDICAID

## 2023-06-14 ENCOUNTER — TELEPHONE (OUTPATIENT)
Dept: SURGERY | Facility: CLINIC | Age: 58
End: 2023-06-14
Payer: MEDICAID

## 2023-06-14 DIAGNOSIS — D64.9 ANEMIA: Primary | ICD-10-CM

## 2023-06-14 PROBLEM — D50.9 ANEMIA, IRON DEFICIENCY: Status: ACTIVE | Noted: 2023-05-16

## 2023-06-14 RX ORDER — SODIUM CHLORIDE 9 MG/ML
40 INJECTION, SOLUTION INTRAVENOUS AS NEEDED
OUTPATIENT
Start: 2023-06-14

## 2023-06-14 RX ORDER — SODIUM CHLORIDE 0.9 % (FLUSH) 0.9 %
10 SYRINGE (ML) INJECTION AS NEEDED
OUTPATIENT
Start: 2023-06-14

## 2023-06-14 RX ORDER — SODIUM CHLORIDE 0.9 % (FLUSH) 0.9 %
3 SYRINGE (ML) INJECTION EVERY 12 HOURS SCHEDULED
OUTPATIENT
Start: 2023-06-14

## 2023-06-14 NOTE — TELEPHONE ENCOUNTER
I lmom for Pt to call the office back.I just need to let him know that we have added his EGD to his colonoscopy.

## 2023-06-14 NOTE — TELEPHONE ENCOUNTER
"  Caller: Bentley Martínez    Relationship: Self    PT RETURNING CALL, UNABLE TO TELL PT THE REASON FOR CALL, ENCOUNTER DOES NOT STATE \"HUB OK TO READ\", UNABLE TO REACH OFFICE, COULD NOT CONNECT PT WITH SOMEONE TO TELL HIM REASON FOR CALL.    PLEASE CALL BACK PT.  "

## 2023-06-19 DIAGNOSIS — E53.8 LOW VITAMIN B12 LEVEL: ICD-10-CM

## 2023-06-19 RX ORDER — LANOLIN ALCOHOL/MO/W.PET/CERES
1000 CREAM (GRAM) TOPICAL DAILY
Qty: 30 TABLET | Refills: 5 | Status: SHIPPED | OUTPATIENT
Start: 2023-06-19

## 2023-07-28 DIAGNOSIS — I10 ESSENTIAL HYPERTENSION: ICD-10-CM

## 2023-07-28 RX ORDER — SPIRONOLACTONE 25 MG/1
TABLET ORAL
Qty: 30 TABLET | Refills: 0 | Status: SHIPPED | OUTPATIENT
Start: 2023-07-28

## 2023-08-02 DIAGNOSIS — I10 ESSENTIAL HYPERTENSION: ICD-10-CM

## 2023-08-14 DIAGNOSIS — J45.41 MODERATE PERSISTENT ASTHMA WITH ACUTE EXACERBATION: ICD-10-CM

## 2023-08-14 RX ORDER — ALBUTEROL SULFATE 90 UG/1
2 AEROSOL, METERED RESPIRATORY (INHALATION) EVERY 4 HOURS PRN
Qty: 8 G | Refills: 5 | Status: SHIPPED | OUTPATIENT
Start: 2023-08-14

## 2023-08-14 RX ORDER — TIOTROPIUM BROMIDE 18 UG/1
1 CAPSULE ORAL; RESPIRATORY (INHALATION) DAILY
Qty: 30 CAPSULE | Refills: 5 | Status: SHIPPED | OUTPATIENT
Start: 2023-08-14

## 2023-08-14 RX ORDER — MOMETASONE FUROATE AND FORMOTEROL FUMARATE DIHYDRATE 200; 5 UG/1; UG/1
2 AEROSOL RESPIRATORY (INHALATION)
Qty: 13 G | Refills: 5 | Status: SHIPPED | OUTPATIENT
Start: 2023-08-14

## 2023-08-14 NOTE — TELEPHONE ENCOUNTER
Caller: Bentley Martínez BARB    Relationship: Self    Best call back number: [phone]    Requested Prescriptions:   Requested Prescriptions     Pending Prescriptions Disp Refills    albuterol sulfate  (90 Base) MCG/ACT inhaler 8 g 5     Sig: Inhale 2 puffs Every 4 (Four) Hours As Needed for Wheezing or Shortness of Air.    Spiriva HandiHaler 18 MCG per inhalation capsule 30 capsule 5     Sig: Place 1 capsule into inhaler and inhale Daily.    Dulera 200-5 MCG/ACT inhaler 13 g 5     Sig: Inhale 2 puffs 2 (Two) Times a Day.        Pharmacy where request should be sent: [facility]     Last office visit with prescribing clinician: 7/18/2023   Last telemedicine visit with prescribing clinician: Visit date not found   Next office visit with prescribing clinician: 9/27/2023     Does the patient have less than a 3 day supply:  [x] Yes  [] No    Would you like a call back once the refill request has been completed: [] Yes [x] No    If the office needs to give you a call back, can they leave a voicemail: [x] Yes [] No    Hetal Hollis Rep   08/14/23 08:09 EDT

## 2023-08-31 ENCOUNTER — TELEPHONE (OUTPATIENT)
Dept: FAMILY MEDICINE CLINIC | Facility: CLINIC | Age: 58
End: 2023-08-31

## 2023-08-31 NOTE — TELEPHONE ENCOUNTER
Caller: Bentley Martínez    Relationship to patient: Self    Best call back number: [phone]    Chief complaint: N/A    Type of visit: IN OFFICE PROCEDURE/STITCH REMOVAL    Requested date: 08/31/2023 OR 09/01/2023     If rescheduling, when is the original appointment: N/A     Additional notes:PATIENT HAS STITCHES IN LEFT HAND THAT NEED TO BE REMOVED. FOUR STITCHES REMOVED. PLEASE CALL PATIENT TO SCHEDULE/ADVISE

## 2023-10-27 ENCOUNTER — ANESTHESIA EVENT (OUTPATIENT)
Dept: GASTROENTEROLOGY | Facility: HOSPITAL | Age: 58
End: 2023-10-27
Payer: MEDICAID

## 2023-10-27 DIAGNOSIS — J30.9 ALLERGIC RHINITIS, UNSPECIFIED SEASONALITY, UNSPECIFIED TRIGGER: ICD-10-CM

## 2023-10-27 RX ORDER — SODIUM CHLORIDE, SODIUM LACTATE, POTASSIUM CHLORIDE, CALCIUM CHLORIDE 600; 310; 30; 20 MG/100ML; MG/100ML; MG/100ML; MG/100ML
30 INJECTION, SOLUTION INTRAVENOUS CONTINUOUS
Status: CANCELLED | OUTPATIENT
Start: 2023-10-27

## 2023-10-27 RX ORDER — CETIRIZINE HYDROCHLORIDE 10 MG/1
10 TABLET ORAL DAILY
Qty: 90 TABLET | Refills: 0 | Status: SHIPPED | OUTPATIENT
Start: 2023-10-27

## 2023-10-27 NOTE — ANESTHESIA PREPROCEDURE EVALUATION
Anesthesia Evaluation     NPO Solid Status: > 8 hours  NPO Liquid Status: > 8 hours           Airway   Mallampati: I  TM distance: >3 FB  No difficulty expected  Dental    (+) edentulous    Comment: Edentulous upper, few remaining lower teeth    Pulmonary - normal exam   (+) asthma (daily inhaler use, breathing currently at baseline, easy, unlabored),recent URI  Cardiovascular - normal exam    (+) hypertension, hyperlipidemia      Neuro/Psych  GI/Hepatic/Renal/Endo    (+) GERD    Musculoskeletal     Abdominal    Substance History   (+) alcohol use     OB/GYN          Other                          Anesthesia Plan    ASA 2     general   total IV anesthesia  intravenous induction     Anesthetic plan, risks, benefits, and alternatives have been provided, discussed and informed consent has been obtained with: patient and other.  Pre-procedure education provided  Plan discussed with CRNA.        CODE STATUS:

## 2023-10-30 ENCOUNTER — ANESTHESIA (OUTPATIENT)
Dept: GASTROENTEROLOGY | Facility: HOSPITAL | Age: 58
End: 2023-10-30
Payer: MEDICAID

## 2023-10-30 ENCOUNTER — HOSPITAL ENCOUNTER (OUTPATIENT)
Facility: HOSPITAL | Age: 58
Setting detail: HOSPITAL OUTPATIENT SURGERY
Discharge: HOME | End: 2023-10-30
Attending: SURGERY | Admitting: SURGERY
Payer: MEDICAID

## 2023-10-30 VITALS
WEIGHT: 143.3 LBS | TEMPERATURE: 97.1 F | OXYGEN SATURATION: 97 % | HEART RATE: 74 BPM | RESPIRATION RATE: 18 BRPM | DIASTOLIC BLOOD PRESSURE: 52 MMHG | SYSTOLIC BLOOD PRESSURE: 159 MMHG | BODY MASS INDEX: 21.79 KG/M2

## 2023-10-30 DIAGNOSIS — D50.9 ANEMIA, IRON DEFICIENCY: ICD-10-CM

## 2023-10-30 DIAGNOSIS — R19.7 DIARRHEA: ICD-10-CM

## 2023-10-30 DIAGNOSIS — D64.9 ANEMIA: ICD-10-CM

## 2023-10-30 PROCEDURE — 25810000003 LACTATED RINGERS PER 1000 ML: Performed by: NURSE ANESTHETIST, CERTIFIED REGISTERED

## 2023-10-30 PROCEDURE — 25010000002 PROPOFOL 10 MG/ML EMULSION: Performed by: NURSE ANESTHETIST, CERTIFIED REGISTERED

## 2023-10-30 PROCEDURE — 88305 TISSUE EXAM BY PATHOLOGIST: CPT | Performed by: SURGERY

## 2023-10-30 RX ORDER — LIDOCAINE HYDROCHLORIDE 20 MG/ML
INJECTION, SOLUTION EPIDURAL; INFILTRATION; INTRACAUDAL; PERINEURAL AS NEEDED
Status: DISCONTINUED | OUTPATIENT
Start: 2023-10-30 | End: 2023-10-30 | Stop reason: SURG

## 2023-10-30 RX ORDER — PROPOFOL 10 MG/ML
VIAL (ML) INTRAVENOUS AS NEEDED
Status: DISCONTINUED | OUTPATIENT
Start: 2023-10-30 | End: 2023-10-30 | Stop reason: SURG

## 2023-10-30 RX ORDER — SODIUM CHLORIDE 9 MG/ML
40 INJECTION, SOLUTION INTRAVENOUS AS NEEDED
Status: DISCONTINUED | OUTPATIENT
Start: 2023-10-30 | End: 2023-10-30 | Stop reason: HOSPADM

## 2023-10-30 RX ORDER — SODIUM CHLORIDE, SODIUM LACTATE, POTASSIUM CHLORIDE, CALCIUM CHLORIDE 600; 310; 30; 20 MG/100ML; MG/100ML; MG/100ML; MG/100ML
INJECTION, SOLUTION INTRAVENOUS CONTINUOUS PRN
Status: DISCONTINUED | OUTPATIENT
Start: 2023-10-30 | End: 2023-10-30 | Stop reason: SURG

## 2023-10-30 RX ORDER — ONDANSETRON 4 MG/1
4 TABLET, FILM COATED ORAL ONCE AS NEEDED
Status: DISCONTINUED | OUTPATIENT
Start: 2023-10-30 | End: 2023-10-30 | Stop reason: HOSPADM

## 2023-10-30 RX ORDER — SODIUM CHLORIDE 0.9 % (FLUSH) 0.9 %
10 SYRINGE (ML) INJECTION AS NEEDED
Status: DISCONTINUED | OUTPATIENT
Start: 2023-10-30 | End: 2023-10-30 | Stop reason: HOSPADM

## 2023-10-30 RX ORDER — SODIUM CHLORIDE 0.9 % (FLUSH) 0.9 %
3 SYRINGE (ML) INJECTION EVERY 12 HOURS SCHEDULED
Status: DISCONTINUED | OUTPATIENT
Start: 2023-10-30 | End: 2023-10-30 | Stop reason: HOSPADM

## 2023-10-30 RX ORDER — ONDANSETRON 2 MG/ML
4 INJECTION INTRAMUSCULAR; INTRAVENOUS ONCE AS NEEDED
Status: DISCONTINUED | OUTPATIENT
Start: 2023-10-30 | End: 2023-10-30 | Stop reason: HOSPADM

## 2023-10-30 RX ADMIN — SODIUM CHLORIDE, POTASSIUM CHLORIDE, SODIUM LACTATE AND CALCIUM CHLORIDE: 600; 310; 30; 20 INJECTION, SOLUTION INTRAVENOUS at 08:25

## 2023-10-30 RX ADMIN — PROPOFOL 100 MG: 10 INJECTION, EMULSION INTRAVENOUS at 08:25

## 2023-10-30 RX ADMIN — LIDOCAINE HYDROCHLORIDE 50 MG: 20 INJECTION, SOLUTION EPIDURAL; INFILTRATION; INTRACAUDAL; PERINEURAL at 08:25

## 2023-10-30 RX ADMIN — PROPOFOL 175 MCG/KG/MIN: 10 INJECTION, EMULSION INTRAVENOUS at 08:25

## 2023-10-30 NOTE — ANESTHESIA POSTPROCEDURE EVALUATION
Patient: Bentley Martínez    Procedure Summary       Date: 10/30/23 Room / Location: [facility] ENDOSCOPY 3 / [facility] ENDOSCOPY    Anesthesia Start: 0823 Anesthesia Stop: 0850    Procedures:       ESOPHAGOGASTRODUODENOSCOPY WITH BIOPSIES      COLONOSCOPY with POLYPECTOMY Diagnosis:       Diarrhea      Anemia, iron deficiency      (Diarrhea [R19.7])    Surgeons: Orlando Leon MD Provider: Raza Nick CRNA    Anesthesia Type: general ASA Status: 2            Anesthesia Type: general    Vitals  Vitals Value Taken Time   BP     Temp     Pulse 79 10/30/23 0851   Resp     SpO2 96 % 10/30/23 0851   Vitals shown include unfiled device data.        Anesthesia Post Evaluation

## 2023-10-30 NOTE — H&P
"[facility]   HISTORY AND PHYSICAL    Patient Name: Bentley Martínez  : 1965  MRN: [id number]  Primary Care Physician:  Rekha Quevedo APRN  Date of admission: 10/30/2023    Subjective   Subjective     Chief Complaint: Diarrhea, iron deficiency anemia    HPI:    Bentley Martínez is a 58 y.o. male who presents with complaints of diarrhea and with iron deficiency anemia.    Review of Systems   Respiratory:  Negative for shortness of breath.    Cardiovascular:  Negative for chest pain.   Gastrointestinal:  Positive for diarrhea.       Personal History     Past Medical History:   Diagnosis Date    Abdominal pain     Alcohol abuse 2021    Allergic rhinitis due to allergen 10/24/2018    Asthma     Black stool     C. difficile diarrhea     Essential hypertension 10/24/2018    GERD (gastroesophageal reflux disease) 10/24/2018    High cholesterol     History of urinary retention     post op    Hyperlipidemia 10/24/2018    Hypertension     Inguinal hernia     Laryngeal candidiasis     Voice hoarseness        Past Surgical History:   Procedure Laterality Date    CYST REMOVAL      Back of neck    HERNIA REPAIR      \"30 years ago\"    INGUINAL HERNIA REPAIR Right 2021    Procedure: INGUINAL HERNIA REPAIR LAPAROSCOPIC WITH DAVINCI ROBOT;  Surgeon: Orlando Leon MD;  Location: [facility];  Service: Robotics - DaVinci;  Laterality: Right;       Family History: family history includes Heart disease in his brother, father, and mother. Otherwise pertinent FHx was reviewed and not pertinent to current issue.    Social History:  reports that he quit smoking about 34 years ago. His smoking use included cigarettes. He has a 18.00 pack-year smoking history. He has never used smokeless tobacco. He reports current alcohol use of about 6.0 - 8.0 standard drinks of alcohol per week. He reports current drug use. Drug: Marijuana.    Home Medications:  albuterol sulfate HFA, aspirin, cetirizine, lisinopril, " lovastatin, mometasone-formoterol, montelukast, ondansetron ODT, pantoprazole, polyethylene glycol, simethicone, spironolactone, tiotropium, and vitamin B-12    Allergies:  No Known Allergies    Objective    Objective     Vitals:        Physical Exam  HENT:      Head: Normocephalic.   Cardiovascular:      Rate and Rhythm: Normal rate.   Pulmonary:      Effort: Pulmonary effort is normal.   Abdominal:      Palpations: Abdomen is soft.   Musculoskeletal:         General: Normal range of motion.      Cervical back: Normal range of motion.   Skin:     General: Skin is warm.   Neurological:      General: No focal deficit present.      Mental Status: He is alert.   Psychiatric:         Mood and Affect: Mood normal.         Result Review    Result Review:  I have personally reviewed the results from the time of this admission to 10/30/2023 07:26 EDT and agree with these findings:  []  Laboratory  []  Microbiology  []  Radiology  []  EKG/Telemetry   []  Cardiology/Vascular   []  Pathology  []  Old records  []  Other:  Most notable findings include:     Assessment & Plan   Assessment / Plan     Brief Patient Summary:  Bentley Martínez is a 58 y.o. male who presents with diarrhea and iron deficiency anemia.    Active Hospital Problems:  Active Hospital Problems    Diagnosis     **Diarrhea     Anemia, iron deficiency        Plan:   Proceed with a EGD and colonoscopy.  Risk benefits and alternatives were explained.    DVT prophylaxis:  No DVT prophylaxis order currently exists.    CODE STATUS:         Admission Status:  I believe this patient meets outpatient status.    Electronically signed by Orlando Leon MD, 10/30/23, 7:26 AM EDT.

## 2023-10-31 LAB
CYTO UR: NORMAL
LAB AP CASE REPORT: NORMAL
LAB AP CLINICAL INFORMATION: NORMAL
PATH REPORT.FINAL DX SPEC: NORMAL
PATH REPORT.GROSS SPEC: NORMAL

## 2023-12-26 DIAGNOSIS — E78.2 MIXED HYPERLIPIDEMIA: ICD-10-CM

## 2023-12-26 DIAGNOSIS — J45.41 MODERATE PERSISTENT ASTHMA WITH ACUTE EXACERBATION: ICD-10-CM

## 2023-12-26 DIAGNOSIS — K21.9 GASTROESOPHAGEAL REFLUX DISEASE WITHOUT ESOPHAGITIS: ICD-10-CM

## 2023-12-26 DIAGNOSIS — K92.89 GAS BLOAT SYNDROME: ICD-10-CM

## 2023-12-26 DIAGNOSIS — J30.9 ALLERGIC RHINITIS, UNSPECIFIED SEASONALITY, UNSPECIFIED TRIGGER: ICD-10-CM

## 2023-12-26 RX ORDER — LOVASTATIN 20 MG/1
20 TABLET ORAL
Qty: 30 TABLET | Refills: 5 | OUTPATIENT
Start: 2023-12-26

## 2023-12-26 RX ORDER — MONTELUKAST SODIUM 10 MG/1
10 TABLET ORAL
Qty: 30 TABLET | Refills: 5 | OUTPATIENT
Start: 2023-12-26

## 2023-12-26 RX ORDER — PANTOPRAZOLE SODIUM 40 MG/1
40 TABLET, DELAYED RELEASE ORAL DAILY
Qty: 30 TABLET | Refills: 5 | OUTPATIENT
Start: 2023-12-26

## 2023-12-27 ENCOUNTER — TELEPHONE (OUTPATIENT)
Dept: FAMILY MEDICINE CLINIC | Facility: CLINIC | Age: 58
End: 2023-12-27
Payer: MEDICAID

## 2023-12-27 DIAGNOSIS — J45.41 MODERATE PERSISTENT ASTHMA WITH ACUTE EXACERBATION: ICD-10-CM

## 2023-12-27 DIAGNOSIS — E78.2 MIXED HYPERLIPIDEMIA: ICD-10-CM

## 2023-12-27 DIAGNOSIS — K92.89 GAS BLOAT SYNDROME: ICD-10-CM

## 2023-12-27 DIAGNOSIS — J30.9 ALLERGIC RHINITIS, UNSPECIFIED SEASONALITY, UNSPECIFIED TRIGGER: ICD-10-CM

## 2023-12-27 DIAGNOSIS — K21.9 GASTROESOPHAGEAL REFLUX DISEASE WITHOUT ESOPHAGITIS: ICD-10-CM

## 2023-12-27 RX ORDER — PANTOPRAZOLE SODIUM 40 MG/1
40 TABLET, DELAYED RELEASE ORAL DAILY
Qty: 30 TABLET | Refills: 0 | Status: SHIPPED | OUTPATIENT
Start: 2023-12-27

## 2023-12-27 RX ORDER — MONTELUKAST SODIUM 10 MG/1
10 TABLET ORAL
Qty: 30 TABLET | Refills: 0 | Status: SHIPPED | OUTPATIENT
Start: 2023-12-27

## 2023-12-27 RX ORDER — LOVASTATIN 20 MG/1
20 TABLET ORAL
Qty: 30 TABLET | Refills: 0 | Status: SHIPPED | OUTPATIENT
Start: 2023-12-27

## 2023-12-27 NOTE — TELEPHONE ENCOUNTER
He is overdue for Annual physical and follow up . You can send him in a 30 day supply, no refills and schedule an apt.

## 2023-12-27 NOTE — TELEPHONE ENCOUNTER
PT NEEDS REFILLS ON LOVASTATIN 20 MG, PANTOPRAZOLE 40 MG, AND MONTELUKAST 10 MG. PLEASE ADVISE IF APPROPRIATE.

## 2024-01-30 ENCOUNTER — OFFICE VISIT (OUTPATIENT)
Dept: FAMILY MEDICINE CLINIC | Facility: CLINIC | Age: 59
End: 2024-01-30
Payer: MEDICAID

## 2024-01-30 VITALS
BODY MASS INDEX: 22.78 KG/M2 | HEART RATE: 69 BPM | WEIGHT: 150.3 LBS | TEMPERATURE: 98.5 F | DIASTOLIC BLOOD PRESSURE: 92 MMHG | OXYGEN SATURATION: 97 % | SYSTOLIC BLOOD PRESSURE: 152 MMHG | HEIGHT: 68 IN

## 2024-01-30 DIAGNOSIS — K92.89 GAS BLOAT SYNDROME: ICD-10-CM

## 2024-01-30 DIAGNOSIS — J30.9 ALLERGIC RHINITIS, UNSPECIFIED SEASONALITY, UNSPECIFIED TRIGGER: ICD-10-CM

## 2024-01-30 DIAGNOSIS — T23.202D: Primary | ICD-10-CM

## 2024-01-30 DIAGNOSIS — K21.9 GASTROESOPHAGEAL REFLUX DISEASE WITHOUT ESOPHAGITIS: ICD-10-CM

## 2024-01-30 DIAGNOSIS — T23.272D: Primary | ICD-10-CM

## 2024-01-30 DIAGNOSIS — J45.41 MODERATE PERSISTENT ASTHMA WITH ACUTE EXACERBATION: ICD-10-CM

## 2024-01-30 DIAGNOSIS — E78.2 MIXED HYPERLIPIDEMIA: ICD-10-CM

## 2024-01-30 DIAGNOSIS — R79.89 LOW VITAMIN B12 LEVEL: ICD-10-CM

## 2024-01-30 DIAGNOSIS — I10 ESSENTIAL HYPERTENSION: ICD-10-CM

## 2024-01-30 PROBLEM — L03.114 CELLULITIS OF LEFT WRIST: Status: ACTIVE | Noted: 2024-01-20

## 2024-01-30 PROBLEM — T22.20XA SECOND DEGREE BURN OF ARM: Status: ACTIVE | Noted: 2024-01-20

## 2024-01-30 RX ORDER — HYDROCHLOROTHIAZIDE 25 MG/1
25 TABLET ORAL DAILY
Qty: 30 TABLET | Refills: 0 | Status: SHIPPED | OUTPATIENT
Start: 2024-01-30

## 2024-01-30 RX ORDER — LISINOPRIL 40 MG/1
40 TABLET ORAL DAILY
Qty: 30 TABLET | Refills: 5 | Status: SHIPPED | OUTPATIENT
Start: 2024-01-30

## 2024-01-30 RX ORDER — CETIRIZINE HYDROCHLORIDE 10 MG/1
10 TABLET ORAL DAILY
Qty: 30 TABLET | Refills: 5 | Status: SHIPPED | OUTPATIENT
Start: 2024-01-30

## 2024-01-30 RX ORDER — LANOLIN ALCOHOL/MO/W.PET/CERES
1000 CREAM (GRAM) TOPICAL DAILY
Qty: 30 TABLET | Refills: 5 | Status: SHIPPED | OUTPATIENT
Start: 2024-01-30

## 2024-01-30 RX ORDER — SPIRONOLACTONE 25 MG/1
TABLET ORAL
Qty: 30 TABLET | Refills: 0 | OUTPATIENT
Start: 2024-01-30

## 2024-01-30 RX ORDER — MONTELUKAST SODIUM 10 MG/1
10 TABLET ORAL
Qty: 30 TABLET | Refills: 5 | Status: SHIPPED | OUTPATIENT
Start: 2024-01-30

## 2024-01-30 RX ORDER — HYDROCODONE BITARTRATE AND ACETAMINOPHEN 5; 325 MG/1; MG/1
1 TABLET ORAL EVERY 6 HOURS PRN
COMMUNITY
Start: 2024-01-23

## 2024-01-30 RX ORDER — LOVASTATIN 20 MG/1
20 TABLET ORAL
Qty: 30 TABLET | Refills: 5 | Status: SHIPPED | OUTPATIENT
Start: 2024-01-30

## 2024-01-30 RX ORDER — PANTOPRAZOLE SODIUM 40 MG/1
40 TABLET, DELAYED RELEASE ORAL DAILY
Qty: 30 TABLET | Refills: 5 | Status: SHIPPED | OUTPATIENT
Start: 2024-01-30

## 2024-01-30 RX ORDER — SIMETHICONE 80 MG
80 TABLET,CHEWABLE ORAL EVERY 6 HOURS PRN
Qty: 30 TABLET | Refills: 5 | Status: SHIPPED | OUTPATIENT
Start: 2024-01-30

## 2024-01-30 RX ORDER — CALCIUM CARBONATE/VITAMIN D3 600 MG-10
1 TABLET ORAL DAILY
COMMUNITY
Start: 2024-01-23

## 2024-01-30 RX ORDER — GINSENG 100 MG
1 CAPSULE ORAL 2 TIMES DAILY
COMMUNITY
Start: 2024-01-23
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
## 2024-01-31 NOTE — ASSESSMENT & PLAN NOTE
Hypertension is worsening.  Medication changes per orders.  I will not restart him on spironolactone due to previous hyperkalemia.  I will start him on hydrochlorothiazide 25 mg daily and he will continue lisinopril 40 mg daily.  Blood pressure will be reassessed in 2 weeks.

## 2024-01-31 NOTE — ASSESSMENT & PLAN NOTE
Asthma is improving with treatment.  The patient is experiencing no daytime asthma symptoms. He is experiencing no nighttime asthma symptoms.  Medications: no change.

## 2024-02-09 ENCOUNTER — PATIENT ROUNDING (BHMG ONLY) (OUTPATIENT)
Dept: FAMILY MEDICINE CLINIC | Facility: CLINIC | Age: 59
End: 2024-02-09
Payer: MEDICAID

## 2024-03-06 DIAGNOSIS — I10 ESSENTIAL HYPERTENSION: ICD-10-CM

## 2024-03-06 RX ORDER — HYDROCHLOROTHIAZIDE 25 MG/1
TABLET ORAL
Qty: 30 TABLET | Refills: 0 | Status: SHIPPED | OUTPATIENT
Start: 2024-03-06

## 2024-04-02 DIAGNOSIS — I10 ESSENTIAL HYPERTENSION: ICD-10-CM

## 2024-04-02 RX ORDER — HYDROCHLOROTHIAZIDE 25 MG/1
TABLET ORAL
Qty: 7 TABLET | Refills: 0 | Status: SHIPPED | OUTPATIENT
Start: 2024-04-02 | End: 2024-04-05 | Stop reason: SDUPTHER

## 2024-04-05 ENCOUNTER — OFFICE VISIT (OUTPATIENT)
Dept: FAMILY MEDICINE CLINIC | Facility: CLINIC | Age: 59
End: 2024-04-05
Payer: MEDICAID

## 2024-04-05 VITALS
SYSTOLIC BLOOD PRESSURE: 124 MMHG | HEIGHT: 68 IN | BODY MASS INDEX: 20.81 KG/M2 | WEIGHT: 137.3 LBS | DIASTOLIC BLOOD PRESSURE: 60 MMHG | HEART RATE: 76 BPM | TEMPERATURE: 97.6 F | OXYGEN SATURATION: 97 %

## 2024-04-05 DIAGNOSIS — J45.41 MODERATE PERSISTENT ASTHMA WITH ACUTE EXACERBATION: ICD-10-CM

## 2024-04-05 DIAGNOSIS — J30.9 ALLERGIC RHINITIS, UNSPECIFIED SEASONALITY, UNSPECIFIED TRIGGER: ICD-10-CM

## 2024-04-05 DIAGNOSIS — I10 ESSENTIAL HYPERTENSION: ICD-10-CM

## 2024-04-05 DIAGNOSIS — Z23 NEED FOR PNEUMOCOCCAL 20-VALENT CONJUGATE VACCINATION: ICD-10-CM

## 2024-04-05 DIAGNOSIS — Z00.00 ANNUAL PHYSICAL EXAM: Primary | ICD-10-CM

## 2024-04-05 DIAGNOSIS — Z12.5 SCREENING FOR PROSTATE CANCER: ICD-10-CM

## 2024-04-05 DIAGNOSIS — K21.9 GASTROESOPHAGEAL REFLUX DISEASE WITHOUT ESOPHAGITIS: ICD-10-CM

## 2024-04-05 DIAGNOSIS — K92.89 GAS BLOAT SYNDROME: ICD-10-CM

## 2024-04-05 DIAGNOSIS — R79.89 LOW VITAMIN B12 LEVEL: ICD-10-CM

## 2024-04-05 DIAGNOSIS — F10.10 ALCOHOL ABUSE: ICD-10-CM

## 2024-04-05 DIAGNOSIS — G56.03 BILATERAL CARPAL TUNNEL SYNDROME: ICD-10-CM

## 2024-04-05 DIAGNOSIS — E78.2 MIXED HYPERLIPIDEMIA: ICD-10-CM

## 2024-04-05 LAB
ALBUMIN SERPL-MCNC: 5.1 G/DL (ref 3.5–5.2)
ALBUMIN/GLOB SERPL: 2 G/DL
ALP SERPL-CCNC: 63 U/L (ref 39–117)
ALT SERPL W P-5'-P-CCNC: 39 U/L (ref 1–41)
ANION GAP SERPL CALCULATED.3IONS-SCNC: 15 MMOL/L (ref 5–15)
AST SERPL-CCNC: 41 U/L (ref 1–40)
BASOPHILS # BLD AUTO: 0.04 10*3/MM3 (ref 0–0.2)
BASOPHILS NFR BLD AUTO: 0.8 % (ref 0–1.5)
BILIRUB BLD-MCNC: NEGATIVE MG/DL
BILIRUB SERPL-MCNC: 0.2 MG/DL (ref 0–1.2)
BUN SERPL-MCNC: 12 MG/DL (ref 6–20)
BUN/CREAT SERPL: 11.7 (ref 7–25)
CALCIUM SPEC-SCNC: 9.3 MG/DL (ref 8.6–10.5)
CHLORIDE SERPL-SCNC: 85 MMOL/L (ref 98–107)
CHOLEST SERPL-MCNC: 164 MG/DL (ref 0–200)
CLARITY, POC: CLEAR
CO2 SERPL-SCNC: 24 MMOL/L (ref 22–29)
COLOR UR: YELLOW
CREAT SERPL-MCNC: 1.03 MG/DL (ref 0.76–1.27)
DEPRECATED RDW RBC AUTO: 41.4 FL (ref 37–54)
EGFRCR SERPLBLD CKD-EPI 2021: 83.7 ML/MIN/1.73
EOSINOPHIL # BLD AUTO: 0.59 10*3/MM3 (ref 0–0.4)
EOSINOPHIL NFR BLD AUTO: 12 % (ref 0.3–6.2)
ERYTHROCYTE [DISTWIDTH] IN BLOOD BY AUTOMATED COUNT: 12.8 % (ref 12.3–15.4)
EXPIRATION DATE: NORMAL
FOLATE SERPL-MCNC: 8.91 NG/ML (ref 4.78–24.2)
GLOBULIN UR ELPH-MCNC: 2.5 GM/DL
GLUCOSE SERPL-MCNC: 89 MG/DL (ref 65–99)
GLUCOSE UR STRIP-MCNC: NEGATIVE MG/DL
HCT VFR BLD AUTO: 36.5 % (ref 37.5–51)
HDLC SERPL-MCNC: 89 MG/DL (ref 40–60)
HGB BLD-MCNC: 12.3 G/DL (ref 13–17.7)
IMM GRANULOCYTES # BLD AUTO: 0.03 10*3/MM3 (ref 0–0.05)
IMM GRANULOCYTES NFR BLD AUTO: 0.6 % (ref 0–0.5)
KETONES UR QL: NEGATIVE
LDLC SERPL CALC-MCNC: 65 MG/DL (ref 0–100)
LDLC/HDLC SERPL: 0.74 {RATIO}
LEUKOCYTE EST, POC: NEGATIVE
LYMPHOCYTES # BLD AUTO: 1.74 10*3/MM3 (ref 0.7–3.1)
LYMPHOCYTES NFR BLD AUTO: 35.3 % (ref 19.6–45.3)
Lab: NORMAL
MCH RBC QN AUTO: 30.1 PG (ref 26.6–33)
MCHC RBC AUTO-ENTMCNC: 33.7 G/DL (ref 31.5–35.7)
MCV RBC AUTO: 89.5 FL (ref 79–97)
MONOCYTES # BLD AUTO: 0.66 10*3/MM3 (ref 0.1–0.9)
MONOCYTES NFR BLD AUTO: 13.4 % (ref 5–12)
NEUTROPHILS NFR BLD AUTO: 1.87 10*3/MM3 (ref 1.7–7)
NEUTROPHILS NFR BLD AUTO: 37.9 % (ref 42.7–76)
NITRITE UR-MCNC: NEGATIVE MG/ML
NRBC BLD AUTO-RTO: 0 /100 WBC (ref 0–0.2)
PH UR: 7 [PH] (ref 5–8)
PLATELET # BLD AUTO: 433 10*3/MM3 (ref 140–450)
PMV BLD AUTO: 9.1 FL (ref 6–12)
POTASSIUM SERPL-SCNC: 4.6 MMOL/L (ref 3.5–5.2)
PROT SERPL-MCNC: 7.6 G/DL (ref 6–8.5)
PROT UR STRIP-MCNC: NEGATIVE MG/DL
PSA SERPL-MCNC: 0.61 NG/ML (ref 0–4)
RBC # BLD AUTO: 4.08 10*6/MM3 (ref 4.14–5.8)
RBC # UR STRIP: NEGATIVE /UL
SODIUM SERPL-SCNC: 124 MMOL/L (ref 136–145)
SP GR UR: 1.01 (ref 1–1.03)
TRIGL SERPL-MCNC: 46 MG/DL (ref 0–150)
TSH SERPL DL<=0.05 MIU/L-ACNC: 1.76 UIU/ML (ref 0.27–4.2)
UROBILINOGEN UR QL: NORMAL
VIT B12 BLD-MCNC: >2000 PG/ML (ref 211–946)
VLDLC SERPL-MCNC: 10 MG/DL (ref 5–40)
WBC NRBC COR # BLD AUTO: 4.93 10*3/MM3 (ref 3.4–10.8)

## 2024-04-05 PROCEDURE — 82607 VITAMIN B-12: CPT | Performed by: NURSE PRACTITIONER

## 2024-04-05 PROCEDURE — G0103 PSA SCREENING: HCPCS | Performed by: NURSE PRACTITIONER

## 2024-04-05 PROCEDURE — 80061 LIPID PANEL: CPT | Performed by: NURSE PRACTITIONER

## 2024-04-05 PROCEDURE — 80050 GENERAL HEALTH PANEL: CPT | Performed by: NURSE PRACTITIONER

## 2024-04-05 PROCEDURE — 82746 ASSAY OF FOLIC ACID SERUM: CPT | Performed by: NURSE PRACTITIONER

## 2024-04-05 RX ORDER — HYDROCHLOROTHIAZIDE 25 MG/1
25 TABLET ORAL DAILY
Qty: 30 TABLET | Refills: 5 | Status: SHIPPED | OUTPATIENT
Start: 2024-04-05

## 2024-04-05 RX ORDER — LOVASTATIN 20 MG/1
20 TABLET ORAL
Qty: 30 TABLET | Refills: 5 | Status: SHIPPED | OUTPATIENT
Start: 2024-04-05

## 2024-04-05 RX ORDER — MONTELUKAST SODIUM 10 MG/1
10 TABLET ORAL
Qty: 30 TABLET | Refills: 5 | Status: SHIPPED | OUTPATIENT
Start: 2024-04-05

## 2024-04-05 RX ORDER — LISINOPRIL 40 MG/1
40 TABLET ORAL DAILY
Qty: 30 TABLET | Refills: 5 | Status: SHIPPED | OUTPATIENT
Start: 2024-04-05

## 2024-04-05 RX ORDER — SIMETHICONE 80 MG
80 TABLET,CHEWABLE ORAL EVERY 6 HOURS PRN
Qty: 30 TABLET | Refills: 5 | Status: SHIPPED | OUTPATIENT
Start: 2024-04-05

## 2024-04-05 RX ORDER — PANTOPRAZOLE SODIUM 40 MG/1
40 TABLET, DELAYED RELEASE ORAL DAILY
Qty: 30 TABLET | Refills: 5 | Status: SHIPPED | OUTPATIENT
Start: 2024-04-05

## 2024-04-05 RX ORDER — LANOLIN ALCOHOL/MO/W.PET/CERES
1000 CREAM (GRAM) TOPICAL DAILY
Qty: 30 TABLET | Refills: 5 | Status: SHIPPED | OUTPATIENT
Start: 2024-04-05

## 2024-04-05 RX ORDER — CETIRIZINE HYDROCHLORIDE 10 MG/1
10 TABLET ORAL DAILY
Qty: 30 TABLET | Refills: 5 | Status: SHIPPED | OUTPATIENT
Start: 2024-04-05

## 2024-04-05 NOTE — ASSESSMENT & PLAN NOTE
I have advised him that he should not drink more than 2 beers per day and recommend that he cut back on his alcohol intake.  Handout provided, see AVS.

## 2024-04-05 NOTE — ASSESSMENT & PLAN NOTE
Asthma is stable.  The patient is experiencing no daytime asthma symptoms and he is experiencing weekly nighttime asthma symptoms.    Plan: Continue same medication/s without change.    Discussed medication dosage, use, side effects, and goals of treatment in detail.    Discussed distinction between quick-relief and maintenance control medications.  Discussed monitoring symptoms and use of quick-relief medications and contacting provider early in the course of exacerbations.  Warning signs of respiratory distress were reviewed with the patient.     Patient Treatment Goals: symptom prevention, minimizing limitation in activity, prevention of exacerbations and use of ER/inpatient care, maintenance of optimal pulmonary function, and minimization of adverse effects of treatment.    Followup in 6 months.

## 2024-04-05 NOTE — PROGRESS NOTES
Chief Complaint  Annual Exam and Hand Pain (Bi-lateral hand pain, pins and needle/numbness )    Subjective            Bentley Martínez is a 58 y.o. male who presents to [facility]   History of Present Illness  He is here today for a follow-up and he also is due for an annual physical.    He is complaining that his hands/wrist go numb mostly at night when he wakes up.  He states he was told he had carpal tunnel previously but it usually been better controlled.    Hypertension: stable on Lisinopril 40mg daily and hydrochlorothiazide 25mg daily. His BP reading today in the office is within normal limits.     Hyperlipidemia: currently on lovastatin 20 mg daily.    Asthma: Patient has been having increase of asthma symptoms the last few weeks.  Previously we tried to switch him to Breztri inhaler but he did not tolerate this inhaler and went back to using Dulera twice daily and his Spiriva daily. He is also on Singulair nightly.  He does not tolerate the albuterol in the nebulizer, states it makes him shaky, but he is able to tolerate the albuterol inhaler.     GERD:  He is stable on pantoprazole 40 mg daily.       He does continue to smoke Marijuana daily and also consumes an average of 6 - 8 beers / day.     Discussed and reviewed:  Alcohol Misuse Screening and Counseling, he continues to drink 6-8 beers per day.  He states sometimes he does not drink as much, states he only had 4 beers last night.  Cardiovascular Disease Screening Tests, fasting lipid panel  Counseling to Prevent Tobacco Use Bentley Martínez  reports that he quit smoking about 35 years ago. His smoking use included cigarettes. He started smoking about 47 years ago. He has a 18 pack-year smoking history. He has never been exposed to tobacco smoke. He has never used smokeless tobacco.   Diabetes Screening-Lab Order for either glucose or A1c  Glaucoma screening, recommend routine vision exams.  Recommend routine biannual  "dental exams.  Human Immunodeficiency Virus (HIV) Screening, diclines.   Screening for Sexually Transmitted Infections (STIs), declines need.  Prostate Cancer Screening for males ages 50-75 or sooner if any family history of prostate cancer.   Screening urinalysis is negative.  [facility] IMMUNIZATIONS: Prevnar 20 (Pneumococcal 20-valent conjugate) discussed and given today.    Tobacco Use: Medium Risk (4/5/2024)    Patient History     Smoking Tobacco Use: Former     Smokeless Tobacco Use: Never     Passive Exposure: Never      E-cigarette/Vaping    E-cigarette/Vaping Use Never User      E-cigarette/Vaping Substances     E-cigarette/Vaping Devices       Alcohol Use: Not on file         Objective   Vital Signs:   Vitals:    04/05/24 0715 04/05/24 0722   BP: 141/86 124/60   BP Location: Left arm    Patient Position: Sitting    Cuff Size: Adult    Pulse: 76    Temp: 97.6 °F (36.4 °C)    SpO2: 97%    Weight: 62.3 kg (137 lb 4.8 oz)    Height: 172.7 cm (68\")      Body mass index is 20.88 kg/m².    Wt Readings from Last 3 Encounters:   04/05/24 62.3 kg (137 lb 4.8 oz)   01/30/24 68.2 kg (150 lb 4.8 oz)   01/20/24 61.2 kg (135 lb)     BP Readings from Last 3 Encounters:   04/05/24 124/60   04/01/24 113/69   01/30/24 152/92       Health Maintenance   Topic Date Due    ANNUAL PHYSICAL  07/01/2022    PROSTATE CANCER SCREENING  04/05/2024    ZOSTER VACCINE (1 of 2) 04/05/2025 (Originally 7/5/2014)    LIPID PANEL  06/08/2024    INFLUENZA VACCINE  08/01/2024    TDAP/TD VACCINES (2 - Td or Tdap) 11/03/2032    COLORECTAL CANCER SCREENING  10/30/2033    HEPATITIS C SCREENING  Completed    COVID-19 Vaccine  Completed    Pneumococcal Vaccine 0-64  Completed       /60   Pulse 76   Temp 97.6 °F (36.4 °C)   Ht 172.7 cm (68\")   Wt 62.3 kg (137 lb 4.8 oz)   SpO2 97%   BMI 20.88 kg/m²       Current Outpatient Medications:     albuterol sulfate  (90 Base) MCG/ACT inhaler, Inhale 2 puffs Every 4 (Four) Hours As Needed for " Wheezing or Shortness of Air., Disp: 8 g, Rfl: 5    aspirin 81 MG EC tablet, Take 1 tablet by mouth Daily., Disp: 99 tablet, Rfl: 99    cetirizine (zyrTEC) 10 MG tablet, Take 1 tablet by mouth Daily., Disp: 30 tablet, Rfl: 5    doxycycline (MONODOX) 100 MG capsule, Take 1 capsule by mouth 2 (Two) Times a Day for 7 days., Disp: 14 capsule, Rfl: 0    Dulera 200-5 MCG/ACT inhaler, Inhale 2 puffs 2 (Two) Times a Day., Disp: 13 g, Rfl: 5    hydroCHLOROthiazide 25 MG tablet, Take 1 tablet by mouth Daily., Disp: 30 tablet, Rfl: 5    lisinopril (PRINIVIL,ZESTRIL) 40 MG tablet, Take 1 tablet by mouth Daily., Disp: 30 tablet, Rfl: 5    lovastatin (MEVACOR) 20 MG tablet, Take 1 tablet by mouth every night at bedtime., Disp: 30 tablet, Rfl: 5    montelukast (SINGULAIR) 10 MG tablet, Take 1 tablet by mouth every night at bedtime., Disp: 30 tablet, Rfl: 5    pantoprazole (PROTONIX) 40 MG EC tablet, Take 1 tablet by mouth Daily., Disp: 30 tablet, Rfl: 5    simethicone (Gas-X) 80 MG chewable tablet, Chew 1 tablet Every 6 (Six) Hours As Needed for Flatulence., Disp: 30 tablet, Rfl: 5    Spiriva HandiHaler 18 MCG per inhalation capsule, Place 1 capsule into inhaler and inhale Daily., Disp: 30 capsule, Rfl: 5    Thiamine Mononitrate (B1) 100 MG tablet, Take 1 tablet by mouth Daily., Disp: , Rfl:     vitamin B-12 (CYANOCOBALAMIN) 1000 MCG tablet, Take 1 tablet by mouth Daily., Disp: 30 tablet, Rfl: 5   Past Medical History:   Diagnosis Date    Abdominal pain     Alcohol abuse 01/28/2021    Allergic rhinitis due to allergen 10/24/2018    Asthma     Black stool     C. difficile diarrhea     Essential hypertension 10/24/2018    GERD (gastroesophageal reflux disease) 10/24/2018    High cholesterol     History of urinary retention     post op    Hyperlipidemia 10/24/2018    Hypertension     Inguinal hernia     Laryngeal candidiasis     Voice hoarseness         Physical Exam  Vitals reviewed.   Constitutional:       Appearance: Normal  appearance. He is well-developed.   HENT:      Right Ear: Tympanic membrane, ear canal and external ear normal.      Left Ear: Tympanic membrane, ear canal and external ear normal.      Mouth/Throat:      Mouth: Mucous membranes are moist.      Pharynx: No pharyngeal swelling, oropharyngeal exudate or posterior oropharyngeal erythema.   Neck:      Thyroid: No thyroid mass, thyromegaly or thyroid tenderness.   Cardiovascular:      Rate and Rhythm: Normal rate and regular rhythm.      Heart sounds: No murmur heard.     No friction rub. No gallop.   Pulmonary:      Effort: Pulmonary effort is normal.      Breath sounds: Normal breath sounds. No wheezing or rhonchi.   Lymphadenopathy:      Cervical: No cervical adenopathy.   Skin:     General: Skin is warm and dry.   Neurological:      Mental Status: He is alert and oriented to person, place, and time.      Cranial Nerves: No cranial nerve deficit.   Psychiatric:         Mood and Affect: Mood and affect normal.         Behavior: Behavior normal.         Thought Content: Thought content normal. Thought content does not include homicidal or suicidal ideation.         Judgment: Judgment normal.          Result Review :    The following data was reviewed by: RAY Britt on 04/05/2024:    Component  Ref Range & Units 08:14   Color  Yellow, Straw, Dark Yellow, Itzel Yellow   Clarity, UA  Clear Clear   Specific Gravity  1.005 - 1.030 1.010   pH, Urine  5.0 - 8.0 7.0   Leukocytes  Negative Negative   Nitrite, UA  Negative Negative   Protein, POC  Negative mg/dL Negative   Glucose, UA  Negative mg/dL Negative   Ketones, UA  Negative Negative   Urobilinogen, UA  Normal, 0.2 E.U./dL Normal   Bilirubin  Negative Negative   Blood, UA  Negative Negative     Common Labs   Common labs          6/8/2023    08:13   Common Labs   Glucose 84    BUN 11    Creatinine 0.95    Sodium 141    Potassium 4.5    Chloride 104    Calcium 9.9    Albumin 4.8    Total Bilirubin <0.2     Alkaline Phosphatase 52    AST (SGOT) 21    ALT (SGPT) 19    WBC 4.80    Hemoglobin 13.6    Hematocrit 41.0    Platelets 361    Total Cholesterol 197    Triglycerides 173    HDL Cholesterol 77    LDL Cholesterol  91      TSH   TSH          6/8/2023    08:13   TSH   TSH 1.570      VITB12   Lab Results   Component Value Date    [id number] 997 (H) 06/08/2023     Assessment & Plan  Annual physical exam  Health maintenance and prevention discussed, handouts provided, see AVS.  Allergic rhinitis, unspecified seasonality, unspecified trigger  He is currently stable on Zyrtec and Singulair, will continue current doses.  Moderate persistent asthma with acute exacerbation  Asthma is stable.  The patient is experiencing no daytime asthma symptoms and he is experiencing weekly nighttime asthma symptoms.    Plan: Continue same medication/s without change.    Discussed medication dosage, use, side effects, and goals of treatment in detail.    Discussed distinction between quick-relief and maintenance control medications.  Discussed monitoring symptoms and use of quick-relief medications and contacting provider early in the course of exacerbations.  Warning signs of respiratory distress were reviewed with the patient.     Patient Treatment Goals: symptom prevention, minimizing limitation in activity, prevention of exacerbations and use of ER/inpatient care, maintenance of optimal pulmonary function, and minimization of adverse effects of treatment.    Followup in 6 months.    Essential hypertension  Hypertension is stable and controlled  Continue current treatment regimen.  Blood pressure will be reassessed in 6 months.  Gas bloat syndrome  Currently stable on simethicone, will continue current dose.  Gastroesophageal reflux disease without esophagitis  Currently stable on pantoprazole, will continue 40 mg dose.  Low vitamin B12 level  Is currently stable on vitamin B12 tablets daily.  We will check his vitamin B12 level  today.  Mixed hyperlipidemia   Lipid abnormalities are stable    Plan:  Continue same medication/s without change.      Discussed medication dosage, use, side effects, and goals of treatment in detail.    Counseled patient on lifestyle modifications to help control hyperlipidemia.     Patient Treatment Goals:   LDL goal is under 100    Followup in 6 months.  Bilateral carpal tunnel syndrome  He is having numbness and both wrist/hands, mostly at night when he wakes up.  I will give him a prescription for bilateral cock-up wrist splints to wear at bedtime.  Alcohol abuse  I have advised him that he should not drink more than 2 beers per day and recommend that he cut back on his alcohol intake.  Handout provided, see AVS.  Screening for prostate cancer  PSA today with labs.  Need for pneumococcal 20-valent conjugate vaccination      Orders Placed This Encounter   Procedures     Wrist Hand Orthosis, Wrist Extension Control Cock-up    Pneumococcal Conjugate Vaccine 20-Valent (PCV20)    PSA Screen    TSH Rfx On Abnormal To Free T4    CBC Auto Differential    Comprehensive Metabolic Panel    Lipid Panel    Vitamin B12 & Folate    POCT urinalysis dipstick, automated     New Medications Ordered This Visit   Medications    cetirizine (zyrTEC) 10 MG tablet     Sig: Take 1 tablet by mouth Daily.     Dispense:  30 tablet     Refill:  5    montelukast (SINGULAIR) 10 MG tablet     Sig: Take 1 tablet by mouth every night at bedtime.     Dispense:  30 tablet     Refill:  5    hydroCHLOROthiazide 25 MG tablet     Sig: Take 1 tablet by mouth Daily.     Dispense:  30 tablet     Refill:  5     Needs an apt.    lisinopril (PRINIVIL,ZESTRIL) 40 MG tablet     Sig: Take 1 tablet by mouth Daily.     Dispense:  30 tablet     Refill:  5    simethicone (Gas-X) 80 MG chewable tablet     Sig: Chew 1 tablet Every 6 (Six) Hours As Needed for Flatulence.     Dispense:  30 tablet     Refill:  5    pantoprazole (PROTONIX) 40 MG EC tablet     Sig:  Take 1 tablet by mouth Daily.     Dispense:  30 tablet     Refill:  5    vitamin B-12 (CYANOCOBALAMIN) 1000 MCG tablet     Sig: Take 1 tablet by mouth Daily.     Dispense:  30 tablet     Refill:  5    lovastatin (MEVACOR) 20 MG tablet     Sig: Take 1 tablet by mouth every night at bedtime.     Dispense:  30 tablet     Refill:  5          BMI is within normal parameters. No other follow-up for BMI required.        Diagnosis Plan   1. Annual physical exam  TSH Rfx On Abnormal To Free T4    CBC Auto Differential    Comprehensive Metabolic Panel    Lipid Panel    POCT urinalysis dipstick, automated      2. Allergic rhinitis, unspecified seasonality, unspecified trigger  cetirizine (zyrTEC) 10 MG tablet    montelukast (SINGULAIR) 10 MG tablet      3. Moderate persistent asthma with acute exacerbation  montelukast (SINGULAIR) 10 MG tablet      4. Essential hypertension  hydroCHLOROthiazide 25 MG tablet    lisinopril (PRINIVIL,ZESTRIL) 40 MG tablet    TSH Rfx On Abnormal To Free T4    CBC Auto Differential    Comprehensive Metabolic Panel    Lipid Panel      5. Gas bloat syndrome  simethicone (Gas-X) 80 MG chewable tablet    pantoprazole (PROTONIX) 40 MG EC tablet      6. Gastroesophageal reflux disease without esophagitis  pantoprazole (PROTONIX) 40 MG EC tablet      7. Low vitamin B12 level  vitamin B-12 (CYANOCOBALAMIN) 1000 MCG tablet    Vitamin B12 & Folate      8. Mixed hyperlipidemia  lovastatin (MEVACOR) 20 MG tablet    Comprehensive Metabolic Panel    Lipid Panel      9. Bilateral carpal tunnel syndrome   Wrist Hand Orthosis, Wrist Extension Control Cock-up      10. Alcohol abuse  Vitamin B12 & Folate      11. Screening for prostate cancer  PSA Screen      12. Need for pneumococcal 20-valent conjugate vaccination  Pneumococcal Conjugate Vaccine 20-Valent (PCV20)            FOLLOW UP  Return in about 6 months (around 10/5/2024) for Next scheduled follow up.  Patient was given instructions and counseling  regarding his condition or for health maintenance advice. Please see specific information pulled into the AVS if appropriate.       CURRENT & DISCONTINUED MEDICATIONS  Current Outpatient Medications   Medication Instructions    albuterol sulfate  (90 Base) MCG/ACT inhaler 2 puffs, Inhalation, Every 4 Hours PRN    aspirin 81 mg, Oral, Daily    cetirizine (ZYRTEC) 10 mg, Oral, Daily    doxycycline (MONODOX) 100 mg, Oral, 2 Times Daily    Dulera 200-5 MCG/ACT inhaler 2 puffs, Inhalation, 2 Times Daily - RT    hydroCHLOROthiazide 25 mg, Oral, Daily    lisinopril (PRINIVIL,ZESTRIL) 40 mg, Oral, Daily    lovastatin (MEVACOR) 20 mg, Oral, Every Night at Bedtime    montelukast (SINGULAIR) 10 mg, Oral, Every Night at Bedtime    pantoprazole (PROTONIX) 40 mg, Oral, Daily    simethicone (GAS-X) 80 mg, Oral, Every 6 Hours PRN    Spiriva HandiHaler 18 MCG per inhalation capsule 1 capsule, Inhalation, Daily    Thiamine Mononitrate (B1) 100 MG tablet 1 tablet, Oral, Daily    vitamin B-12 (CYANOCOBALAMIN) 1,000 mcg, Oral, Daily       Medications Discontinued During This Encounter   Medication Reason    HYDROcodone-acetaminophen (NORCO) 5-325 MG per tablet *Therapy completed    bacitracin 500 UNIT/GM ointment *Therapy completed    cetirizine (zyrTEC) 10 MG tablet Reorder    vitamin B-12 (CYANOCOBALAMIN) 1000 MCG tablet Reorder    lisinopril (PRINIVIL,ZESTRIL) 40 MG tablet Reorder    lovastatin (MEVACOR) 20 MG tablet Reorder    montelukast (SINGULAIR) 10 MG tablet Reorder    pantoprazole (PROTONIX) 40 MG EC tablet Reorder    simethicone (Gas-X) 80 MG chewable tablet Reorder    hydroCHLOROthiazide 25 MG tablet Reorder        Parts of this note are electronic transcriptions/translations of spoken language to printed text using the Dragon Dictation system.    Rekha Quevedo, RAY  04/05/24  11:38 EDT

## 2024-04-05 NOTE — ASSESSMENT & PLAN NOTE
He is having numbness and both wrist/hands, mostly at night when he wakes up.  I will give him a prescription for bilateral cock-up wrist splints to wear at bedtime.

## 2024-04-10 ENCOUNTER — PATIENT ROUNDING (BHMG ONLY) (OUTPATIENT)
Dept: URGENT CARE | Facility: CLINIC | Age: 59
End: 2024-04-10
Payer: MEDICAID

## 2024-04-10 NOTE — ED NOTES
Thank you for letting us care for you in your recent visit to our urgent care center. We would love to hear about your experience with us. Was this the first time you have visited our location?    We’re always looking for ways to make our patients’ experiences even better. Do you have any recommendations on ways we may improve?     I appreciate you taking the time to respond. Please be on the lookout for a survey about your recent visit from [facility] via text or email. We would greatly appreciate if you could fill that out and turn it back in. We want your voice to be heard and we value your feedback.   Thank you for choosing [facility] for your healthcare needs.

## 2024-07-09 DIAGNOSIS — J45.41 MODERATE PERSISTENT ASTHMA WITH ACUTE EXACERBATION: ICD-10-CM

## 2024-07-10 RX ORDER — MOMETASONE FUROATE AND FORMOTEROL FUMARATE DIHYDRATE 200; 5 UG/1; UG/1
2 AEROSOL RESPIRATORY (INHALATION) 2 TIMES DAILY
Qty: 13 G | Refills: 5 | Status: SHIPPED | OUTPATIENT
Start: 2024-07-10

## 2024-08-08 DIAGNOSIS — J45.41 MODERATE PERSISTENT ASTHMA WITH ACUTE EXACERBATION: ICD-10-CM

## 2024-08-09 RX ORDER — TIOTROPIUM BROMIDE 18 UG/1
CAPSULE ORAL; RESPIRATORY (INHALATION)
Qty: 30 CAPSULE | Refills: 5 | OUTPATIENT
Start: 2024-08-09

## 2024-08-14 DIAGNOSIS — J45.41 MODERATE PERSISTENT ASTHMA WITH ACUTE EXACERBATION: ICD-10-CM

## 2024-08-15 RX ORDER — TIOTROPIUM BROMIDE 18 UG/1
CAPSULE ORAL; RESPIRATORY (INHALATION)
Qty: 30 CAPSULE | Refills: 5 | OUTPATIENT
Start: 2024-08-15
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
## 2024-08-20 ENCOUNTER — OFFICE VISIT (OUTPATIENT)
Dept: PULMONOLOGY | Facility: CLINIC | Age: 59
End: 2024-08-20
Payer: MEDICAID

## 2024-08-20 VITALS
RESPIRATION RATE: 16 BRPM | HEIGHT: 68 IN | WEIGHT: 141.1 LBS | HEART RATE: 68 BPM | SYSTOLIC BLOOD PRESSURE: 131 MMHG | TEMPERATURE: 98 F | DIASTOLIC BLOOD PRESSURE: 79 MMHG | BODY MASS INDEX: 21.38 KG/M2

## 2024-08-20 DIAGNOSIS — J30.9 ALLERGIC RHINITIS, UNSPECIFIED SEASONALITY, UNSPECIFIED TRIGGER: ICD-10-CM

## 2024-08-20 DIAGNOSIS — J45.41 MODERATE PERSISTENT ASTHMA WITH ACUTE EXACERBATION: ICD-10-CM

## 2024-08-20 PROCEDURE — 3078F DIAST BP <80 MM HG: CPT | Performed by: NURSE PRACTITIONER

## 2024-08-20 PROCEDURE — 3075F SYST BP GE 130 - 139MM HG: CPT | Performed by: NURSE PRACTITIONER

## 2024-08-20 PROCEDURE — 1159F MED LIST DOCD IN RCRD: CPT | Performed by: NURSE PRACTITIONER

## 2024-08-20 PROCEDURE — 99214 OFFICE O/P EST MOD 30 MIN: CPT | Performed by: NURSE PRACTITIONER

## 2024-08-20 PROCEDURE — 1160F RVW MEDS BY RX/DR IN RCRD: CPT | Performed by: NURSE PRACTITIONER

## 2024-08-20 RX ORDER — ALBUTEROL SULFATE 90 UG/1
2 AEROSOL, METERED RESPIRATORY (INHALATION) EVERY 4 HOURS PRN
Qty: 8 G | Refills: 11 | Status: SHIPPED | OUTPATIENT
Start: 2024-08-20

## 2024-08-20 RX ORDER — MOMETASONE FUROATE AND FORMOTEROL FUMARATE DIHYDRATE 200; 5 UG/1; UG/1
2 AEROSOL RESPIRATORY (INHALATION) 2 TIMES DAILY
Qty: 13 G | Refills: 11 | Status: SHIPPED | OUTPATIENT
Start: 2024-08-20

## 2024-08-20 RX ORDER — TIOTROPIUM BROMIDE 18 UG/1
1 CAPSULE ORAL; RESPIRATORY (INHALATION) DAILY
Qty: 30 CAPSULE | Refills: 11 | Status: SHIPPED | OUTPATIENT
Start: 2024-08-20

## 2024-08-20 RX ORDER — MONTELUKAST SODIUM 10 MG/1
10 TABLET ORAL
Qty: 90 TABLET | Refills: 3 | Status: SHIPPED | OUTPATIENT
Start: 2024-08-20

## 2024-08-20 RX ORDER — CETIRIZINE HYDROCHLORIDE 10 MG/1
10 TABLET ORAL DAILY
Qty: 90 TABLET | Refills: 3 | Status: SHIPPED | OUTPATIENT
Start: 2024-08-20

## 2024-10-06 PROBLEM — I21.9 MYOCARDIAL INFARCTION: Status: ACTIVE | Noted: 2024-10-06

## 2024-10-06 PROBLEM — R53.83 MALAISE AND FATIGUE: Status: RESOLVED | Noted: 2024-10-06 | Resolved: 2024-10-06

## 2024-10-06 PROBLEM — R53.81 MALAISE AND FATIGUE: Status: RESOLVED | Noted: 2024-10-06 | Resolved: 2024-10-06

## 2024-10-07 ENCOUNTER — OFFICE VISIT (OUTPATIENT)
Dept: FAMILY MEDICINE CLINIC | Facility: CLINIC | Age: 59
End: 2024-10-07
Payer: MEDICAID

## 2024-10-07 VITALS
WEIGHT: 139.2 LBS | OXYGEN SATURATION: 96 % | HEART RATE: 69 BPM | TEMPERATURE: 97.8 F | HEIGHT: 68 IN | DIASTOLIC BLOOD PRESSURE: 82 MMHG | SYSTOLIC BLOOD PRESSURE: 148 MMHG | BODY MASS INDEX: 21.1 KG/M2

## 2024-10-07 DIAGNOSIS — M79.642 BILATERAL HAND PAIN: ICD-10-CM

## 2024-10-07 DIAGNOSIS — K92.89 GAS BLOAT SYNDROME: ICD-10-CM

## 2024-10-07 DIAGNOSIS — R20.2 NUMBNESS AND TINGLING IN BOTH HANDS: ICD-10-CM

## 2024-10-07 DIAGNOSIS — R20.0 NUMBNESS AND TINGLING IN BOTH HANDS: ICD-10-CM

## 2024-10-07 DIAGNOSIS — E78.2 MIXED HYPERLIPIDEMIA: ICD-10-CM

## 2024-10-07 DIAGNOSIS — I10 ESSENTIAL HYPERTENSION: ICD-10-CM

## 2024-10-07 DIAGNOSIS — R73.03 PREDIABETES: ICD-10-CM

## 2024-10-07 DIAGNOSIS — M79.641 BILATERAL HAND PAIN: ICD-10-CM

## 2024-10-07 DIAGNOSIS — K21.9 GASTROESOPHAGEAL REFLUX DISEASE WITHOUT ESOPHAGITIS: ICD-10-CM

## 2024-10-07 DIAGNOSIS — D64.9 ANEMIA, UNSPECIFIED TYPE: ICD-10-CM

## 2024-10-07 DIAGNOSIS — G56.03 BILATERAL CARPAL TUNNEL SYNDROME: Primary | ICD-10-CM

## 2024-10-07 DIAGNOSIS — R79.89 LOW VITAMIN B12 LEVEL: ICD-10-CM

## 2024-10-07 LAB
ALBUMIN SERPL-MCNC: 4.7 G/DL (ref 3.5–5.2)
ALBUMIN/GLOB SERPL: 2 G/DL
ALP SERPL-CCNC: 57 U/L (ref 39–117)
ALT SERPL W P-5'-P-CCNC: 16 U/L (ref 1–41)
ANION GAP SERPL CALCULATED.3IONS-SCNC: 11 MMOL/L (ref 5–15)
AST SERPL-CCNC: 23 U/L (ref 1–40)
BASOPHILS # BLD AUTO: 0.08 10*3/MM3 (ref 0–0.2)
BASOPHILS NFR BLD AUTO: 1.5 % (ref 0–1.5)
BILIRUB SERPL-MCNC: 0.2 MG/DL (ref 0–1.2)
BUN SERPL-MCNC: 9 MG/DL (ref 8–23)
BUN/CREAT SERPL: 10.3 (ref 7–25)
CALCIUM SPEC-SCNC: 9.5 MG/DL (ref 8.6–10.5)
CHLORIDE SERPL-SCNC: 92 MMOL/L (ref 98–107)
CHOLEST SERPL-MCNC: 155 MG/DL (ref 0–200)
CHROMATIN AB SERPL-ACNC: <10 IU/ML (ref 0–14)
CO2 SERPL-SCNC: 25 MMOL/L (ref 22–29)
CREAT SERPL-MCNC: 0.87 MG/DL (ref 0.76–1.27)
CRP SERPL-MCNC: <0.3 MG/DL (ref 0–0.5)
DEPRECATED RDW RBC AUTO: 43.7 FL (ref 37–54)
EGFRCR SERPLBLD CKD-EPI 2021: 98.8 ML/MIN/1.73
EOSINOPHIL # BLD AUTO: 0.58 10*3/MM3 (ref 0–0.4)
EOSINOPHIL NFR BLD AUTO: 11 % (ref 0.3–6.2)
ERYTHROCYTE [DISTWIDTH] IN BLOOD BY AUTOMATED COUNT: 12.7 % (ref 12.3–15.4)
ERYTHROCYTE [SEDIMENTATION RATE] IN BLOOD: <1 MM/HR (ref 0–20)
FERRITIN SERPL-MCNC: 96.6 NG/ML (ref 30–400)
GLOBULIN UR ELPH-MCNC: 2.3 GM/DL
GLUCOSE SERPL-MCNC: 83 MG/DL (ref 65–99)
HBA1C MFR BLD: 5.1 % (ref 4.8–5.6)
HCT VFR BLD AUTO: 34.5 % (ref 37.5–51)
HDLC SERPL-MCNC: 71 MG/DL (ref 40–60)
HGB BLD-MCNC: 12 G/DL (ref 13–17.7)
HOLD SPECIMEN: NORMAL
IMM GRANULOCYTES # BLD AUTO: 0.03 10*3/MM3 (ref 0–0.05)
IMM GRANULOCYTES NFR BLD AUTO: 0.6 % (ref 0–0.5)
IRON 24H UR-MRATE: 94 MCG/DL (ref 59–158)
IRON SATN MFR SERPL: 23 % (ref 20–50)
LDLC SERPL CALC-MCNC: 63 MG/DL (ref 0–100)
LDLC/HDLC SERPL: 0.85 {RATIO}
LYMPHOCYTES # BLD AUTO: 1.67 10*3/MM3 (ref 0.7–3.1)
LYMPHOCYTES NFR BLD AUTO: 31.6 % (ref 19.6–45.3)
MCH RBC QN AUTO: 32.9 PG (ref 26.6–33)
MCHC RBC AUTO-ENTMCNC: 34.8 G/DL (ref 31.5–35.7)
MCV RBC AUTO: 94.5 FL (ref 79–97)
MONOCYTES # BLD AUTO: 0.61 10*3/MM3 (ref 0.1–0.9)
MONOCYTES NFR BLD AUTO: 11.6 % (ref 5–12)
NEUTROPHILS NFR BLD AUTO: 2.31 10*3/MM3 (ref 1.7–7)
NEUTROPHILS NFR BLD AUTO: 43.7 % (ref 42.7–76)
NRBC BLD AUTO-RTO: 0 /100 WBC (ref 0–0.2)
PLATELET # BLD AUTO: 451 10*3/MM3 (ref 140–450)
PMV BLD AUTO: 9 FL (ref 6–12)
POTASSIUM SERPL-SCNC: 4.3 MMOL/L (ref 3.5–5.2)
PROT SERPL-MCNC: 7 G/DL (ref 6–8.5)
RBC # BLD AUTO: 3.65 10*6/MM3 (ref 4.14–5.8)
SODIUM SERPL-SCNC: 128 MMOL/L (ref 136–145)
TIBC SERPL-MCNC: 416 MCG/DL (ref 298–536)
TRANSFERRIN SERPL-MCNC: 279 MG/DL (ref 200–360)
TRIGL SERPL-MCNC: 119 MG/DL (ref 0–150)
VLDLC SERPL-MCNC: 21 MG/DL (ref 5–40)
WBC NRBC COR # BLD AUTO: 5.28 10*3/MM3 (ref 3.4–10.8)

## 2024-10-07 PROCEDURE — 84466 ASSAY OF TRANSFERRIN: CPT | Performed by: NURSE PRACTITIONER

## 2024-10-07 PROCEDURE — 80061 LIPID PANEL: CPT | Performed by: NURSE PRACTITIONER

## 2024-10-07 PROCEDURE — 3077F SYST BP >= 140 MM HG: CPT | Performed by: NURSE PRACTITIONER

## 2024-10-07 PROCEDURE — 1160F RVW MEDS BY RX/DR IN RCRD: CPT | Performed by: NURSE PRACTITIONER

## 2024-10-07 PROCEDURE — 83036 HEMOGLOBIN GLYCOSYLATED A1C: CPT | Performed by: NURSE PRACTITIONER

## 2024-10-07 PROCEDURE — 82728 ASSAY OF FERRITIN: CPT | Performed by: NURSE PRACTITIONER

## 2024-10-07 PROCEDURE — 85652 RBC SED RATE AUTOMATED: CPT | Performed by: NURSE PRACTITIONER

## 2024-10-07 PROCEDURE — 86140 C-REACTIVE PROTEIN: CPT | Performed by: NURSE PRACTITIONER

## 2024-10-07 PROCEDURE — 99214 OFFICE O/P EST MOD 30 MIN: CPT | Performed by: NURSE PRACTITIONER

## 2024-10-07 PROCEDURE — 80053 COMPREHEN METABOLIC PANEL: CPT | Performed by: NURSE PRACTITIONER

## 2024-10-07 PROCEDURE — 86431 RHEUMATOID FACTOR QUANT: CPT | Performed by: NURSE PRACTITIONER

## 2024-10-07 PROCEDURE — 83540 ASSAY OF IRON: CPT | Performed by: NURSE PRACTITIONER

## 2024-10-07 PROCEDURE — 86038 ANTINUCLEAR ANTIBODIES: CPT | Performed by: NURSE PRACTITIONER

## 2024-10-07 PROCEDURE — 3079F DIAST BP 80-89 MM HG: CPT | Performed by: NURSE PRACTITIONER

## 2024-10-07 PROCEDURE — 85025 COMPLETE CBC W/AUTO DIFF WBC: CPT | Performed by: NURSE PRACTITIONER

## 2024-10-07 PROCEDURE — 36415 COLL VENOUS BLD VENIPUNCTURE: CPT | Performed by: NURSE PRACTITIONER

## 2024-10-07 PROCEDURE — 1159F MED LIST DOCD IN RCRD: CPT | Performed by: NURSE PRACTITIONER

## 2024-10-07 RX ORDER — NAPROXEN 500 MG/1
500 TABLET ORAL 2 TIMES DAILY WITH MEALS
Qty: 60 TABLET | Refills: 5 | Status: SHIPPED | OUTPATIENT
Start: 2024-10-07

## 2024-10-07 RX ORDER — LISINOPRIL 40 MG/1
40 TABLET ORAL DAILY
Qty: 30 TABLET | Refills: 5 | Status: SHIPPED | OUTPATIENT
Start: 2024-10-07

## 2024-10-07 RX ORDER — HYDROCHLOROTHIAZIDE 25 MG/1
25 TABLET ORAL DAILY
Qty: 30 TABLET | Refills: 5 | Status: SHIPPED | OUTPATIENT
Start: 2024-10-07

## 2024-10-07 RX ORDER — LOVASTATIN 20 MG
20 TABLET ORAL
Qty: 30 TABLET | Refills: 5 | Status: SHIPPED | OUTPATIENT
Start: 2024-10-07

## 2024-10-07 RX ORDER — PANTOPRAZOLE SODIUM 40 MG/1
40 TABLET, DELAYED RELEASE ORAL DAILY
Qty: 30 TABLET | Refills: 5 | Status: SHIPPED | OUTPATIENT
Start: 2024-10-07

## 2024-10-07 RX ORDER — SIMETHICONE 80 MG
80 TABLET,CHEWABLE ORAL EVERY 6 HOURS PRN
Qty: 30 TABLET | Refills: 5 | Status: SHIPPED | OUTPATIENT
Start: 2024-10-07

## 2024-10-07 RX ORDER — LANOLIN ALCOHOL/MO/W.PET/CERES
1000 CREAM (GRAM) TOPICAL DAILY
Qty: 30 TABLET | Refills: 5 | Status: SHIPPED | OUTPATIENT
Start: 2024-10-07

## 2024-10-07 NOTE — PROGRESS NOTES
Chief Complaint  Hypertension, Hyperlipidemia, and Hand Pain (Numbness, tingling, nodules)    Subjective            Bentley Martínez is a 59 y.o. male who presents to [facility]   History of Present Illness  6-month follow-up.    He is complaining that his hands/wrist go numb mostly at night when he wakes up.  He states he was told he had carpal tunnel previously.  He states that he has some wrist splints but they do not help.  He states that it seems to be getting worse.  He states that he wakes up in pain.  He also states that he cannot drive for very long that his hands go numb.  He also is complaining of nodules that are painful on both hands.  He states his to middle left fingers will lock up on him.  He sometimes takes naproxen which seems to help.    Hypertension: stable on Lisinopril 40 mg daily and hydrochlorothiazide 25 mg daily.  His blood pressure is above goal today.  He has not been checking his blood pressure at home.  He states he is taking his medication as prescribed.  He denies any headaches, chest pain or dizziness.    Hyperlipidemia: currently on lovastatin 20 mg daily.    Asthma: He is currently stable and follows with pulmonology.    GERD:  He is stable on pantoprazole 40 mg daily.  He states he sometimes has some heartburn.    He does continue to smoke Marijuana daily and also consumes an average of 6 - 8 beers / day.       Tobacco Use: Medium Risk (10/7/2024)    Patient History     Smoking Tobacco Use: Former     Smokeless Tobacco Use: Never     Passive Exposure: Never      E-cigarette/Vaping    E-cigarette/Vaping Use Never User     Passive Exposure No     Counseling Given No      E-cigarette/Vaping Substances    Nicotine No     THC No     CBD No     Flavoring No      E-cigarette/Vaping Devices    Disposable No     Pre-filled or Refillable Cartridge No     Refillable Tank No     Pre-filled Pod No        Alcohol Use: Not on file         Objective   Vital Signs:  "  Vitals:    10/07/24 0706 10/07/24 0711   BP: 142/86 148/82   BP Location: Left arm    Patient Position: Sitting    Cuff Size: Adult    Pulse: 69    Temp: 97.8 °F (36.6 °C)    SpO2: 96%    Weight: 63.1 kg (139 lb 3.2 oz)    Height: 172.7 cm (68\")      Body mass index is 21.17 kg/m².    Wt Readings from Last 3 Encounters:   10/07/24 63.1 kg (139 lb 3.2 oz)   08/20/24 64 kg (141 lb 1.6 oz)   04/05/24 62.3 kg (137 lb 4.8 oz)     BP Readings from Last 3 Encounters:   10/07/24 148/82   08/20/24 131/79   04/05/24 124/60       Health Maintenance   Topic Date Due    INFLUENZA VACCINE  11/01/2024 (Originally 8/1/2024)    ZOSTER VACCINE (1 of 2) 04/05/2025 (Originally 7/5/2014)    PROSTATE CANCER SCREENING  04/05/2025    ANNUAL PHYSICAL  04/05/2025    LIPID PANEL  04/05/2025    TDAP/TD VACCINES (2 - Td or Tdap) 11/03/2032    COLORECTAL CANCER SCREENING  10/30/2033    HEPATITIS C SCREENING  Completed    COVID-19 Vaccine  Completed    Pneumococcal Vaccine 0-64  Completed       /82   Pulse 69   Temp 97.8 °F (36.6 °C)   Ht 172.7 cm (68\")   Wt 63.1 kg (139 lb 3.2 oz)   SpO2 96%   BMI 21.17 kg/m²       Current Outpatient Medications:     albuterol sulfate  (90 Base) MCG/ACT inhaler, Inhale 2 puffs Every 4 (Four) Hours As Needed for Wheezing or Shortness of Air., Disp: 8 g, Rfl: 11    aspirin 81 MG EC tablet, Take 1 tablet by mouth Daily., Disp: 99 tablet, Rfl: 99    cetirizine (zyrTEC) 10 MG tablet, Take 1 tablet by mouth Daily., Disp: 90 tablet, Rfl: 3    Dulera 200-5 MCG/ACT inhaler, Inhale 2 puffs 2 (Two) Times a Day., Disp: 13 g, Rfl: 11    hydroCHLOROthiazide 25 MG tablet, Take 1 tablet by mouth Daily. Indications: High Blood Pressure, Disp: 30 tablet, Rfl: 5    lisinopril (PRINIVIL,ZESTRIL) 40 MG tablet, Take 1 tablet by mouth Daily. Indications: High Blood Pressure, Disp: 30 tablet, Rfl: 5    lovastatin (MEVACOR) 20 MG tablet, Take 1 tablet by mouth every night at bedtime. Indications: High Amount of Fats " in the Blood, Disp: 30 tablet, Rfl: 5    montelukast (SINGULAIR) 10 MG tablet, Take 1 tablet by mouth every night at bedtime., Disp: 90 tablet, Rfl: 3    pantoprazole (PROTONIX) 40 MG EC tablet, Take 1 tablet by mouth Daily. Indications: Gastroesophageal Reflux Disease, Disp: 30 tablet, Rfl: 5    simethicone (Gas-X) 80 MG chewable tablet, Chew 1 tablet Every 6 (Six) Hours As Needed for Flatulence., Disp: 30 tablet, Rfl: 5    Spiriva HandiHaler 18 MCG per inhalation capsule, Place 1 capsule into inhaler and inhale Daily., Disp: 30 capsule, Rfl: 11    Thiamine Mononitrate (B1) 100 MG tablet, Take 1 tablet by mouth Daily., Disp: , Rfl:     vitamin B-12 (CYANOCOBALAMIN) 1000 MCG tablet, Take 1 tablet by mouth Daily. Indications: Inadequate Vitamin B12, Disp: 30 tablet, Rfl: 5    naproxen (Naprosyn) 500 MG tablet, Take 1 tablet by mouth 2 (Two) Times a Day With Meals. Indications: hand pain, Disp: 60 tablet, Rfl: 5   Past Medical History:   Diagnosis Date    Abdominal pain     Alcohol abuse 01/28/2021    Allergic rhinitis due to allergen 10/24/2018    Asthma     Black stool     C. difficile diarrhea     Essential hypertension 10/24/2018    GERD (gastroesophageal reflux disease) 10/24/2018    High cholesterol     History of urinary retention     post op    Hyperlipidemia 10/24/2018    Hypertension     Inguinal hernia     Laryngeal candidiasis     Voice hoarseness         Physical Exam  Vitals reviewed.   Constitutional:       Appearance: Normal appearance. He is well-developed.   Neck:      Thyroid: No thyroid mass, thyromegaly or thyroid tenderness.   Cardiovascular:      Rate and Rhythm: Normal rate and regular rhythm.      Heart sounds: No murmur heard.     No friction rub. No gallop.   Pulmonary:      Effort: Pulmonary effort is normal.      Breath sounds: Normal breath sounds. No wheezing or rhonchi.   Musculoskeletal:      Right hand: Deformity present.      Left hand: Deformity present.   Lymphadenopathy:       Cervical: No cervical adenopathy.   Skin:     General: Skin is warm and dry.   Neurological:      Mental Status: He is alert and oriented to person, place, and time.      Cranial Nerves: No cranial nerve deficit.   Psychiatric:         Mood and Affect: Mood and affect normal.         Behavior: Behavior normal.         Thought Content: Thought content normal. Thought content does not include homicidal or suicidal ideation.         Judgment: Judgment normal.     Bilateral hand nodules:    Result Review :    The following data was reviewed by: RAY Britt on 10/07/2024:  Common Labs   Common labs          4/5/2024    08:07   Common Labs   Glucose 89    BUN 12    Creatinine 1.03    Sodium 124    Potassium 4.6    Chloride 85    Calcium 9.3    Albumin 5.1    Total Bilirubin 0.2    Alkaline Phosphatase 63    AST (SGOT) 41    ALT (SGPT) 39    WBC 4.93    Hemoglobin 12.3    Hematocrit 36.5    Platelets 433    Total Cholesterol 164    Triglycerides 46    HDL Cholesterol 89    LDL Cholesterol  65    PSA 0.606      TSH   TSH          4/5/2024    08:07   TSH   TSH 1.760      VITB12   Lab Results   Component Value Date    REELTPUT45 >2,000 (H) 04/05/2024     Assessment & Plan  Bilateral carpal tunnel syndrome  We discussed that he needs to have an EMG and then I can refer him to Ortho.  Essential hypertension  Hypertension is borderline  Continue current treatment regimen.  Ambulatory blood pressure monitoring.  Advised patient to start monitoring blood pressure at home and to notify me if the blood pressure remains above 140/90.  Patient verbalizes understanding.  Blood pressure will be reassessedin 6 months.  Gas bloat syndrome  Currently stable on simethicone as needed, refill sent to pharmacy.  Gastroesophageal reflux disease without esophagitis  GERD is controlled on pantoprazole 40 mg daily, will continue current dose.  Low vitamin B12 level  He is currently stable on vitamin B12 tablets, will continue current  dose, refill sent to pharmacy.  Mixed hyperlipidemia   Lipid abnormalities are stable    Plan:  Continue same medication/s without change.      Discussed medication dosage, use, side effects, and goals of treatment in detail.    Counseled patient on lifestyle modifications to help control hyperlipidemia.     Patient Treatment Goals:   LDL goal is less than 70    Followup in 6 months.  Prediabetes  He is prediabetic, will check A1c today with his labs.  Bilateral hand pain  I will have him get x-rays of his hands.  Plan to refer him to Ortho after he completes x-rays and EMG.  I will prescribe him naproxen 500 mg twice daily as needed, advised to take with food.  Numbness and tingling in both hands  EMG ordered.    Orders Placed This Encounter   Procedures    XR Hand 3+ View Left    XR Hand 3+ View Right    Rheumatoid Factor    MICHELLE With / DsDNA, RNP, Sjogrens A / B, Smith    Sedimentation Rate    C-reactive Protein    CBC Auto Differential    Comprehensive Metabolic Panel    Lipid Panel    Hemoglobin A1c    EMG & Nerve Conduction Test     New Medications Ordered This Visit   Medications    hydroCHLOROthiazide 25 MG tablet     Sig: Take 1 tablet by mouth Daily. Indications: High Blood Pressure     Dispense:  30 tablet     Refill:  5    lisinopril (PRINIVIL,ZESTRIL) 40 MG tablet     Sig: Take 1 tablet by mouth Daily. Indications: High Blood Pressure     Dispense:  30 tablet     Refill:  5    simethicone (Gas-X) 80 MG chewable tablet     Sig: Chew 1 tablet Every 6 (Six) Hours As Needed for Flatulence.     Dispense:  30 tablet     Refill:  5    pantoprazole (PROTONIX) 40 MG EC tablet     Sig: Take 1 tablet by mouth Daily. Indications: Gastroesophageal Reflux Disease     Dispense:  30 tablet     Refill:  5    vitamin B-12 (CYANOCOBALAMIN) 1000 MCG tablet     Sig: Take 1 tablet by mouth Daily. Indications: Inadequate Vitamin B12     Dispense:  30 tablet     Refill:  5    lovastatin (MEVACOR) 20 MG tablet     Sig: Take 1  tablet by mouth every night at bedtime. Indications: High Amount of Fats in the Blood     Dispense:  30 tablet     Refill:  5    naproxen (Naprosyn) 500 MG tablet     Sig: Take 1 tablet by mouth 2 (Two) Times a Day With Meals. Indications: hand pain     Dispense:  60 tablet     Refill:  5          BMI is within normal parameters. No other follow-up for BMI required.        Diagnosis Plan   1. Bilateral carpal tunnel syndrome  EMG & Nerve Conduction Test      2. Essential hypertension  hydroCHLOROthiazide 25 MG tablet    lisinopril (PRINIVIL,ZESTRIL) 40 MG tablet    CBC Auto Differential    Comprehensive Metabolic Panel    Lipid Panel      3. Gas bloat syndrome  simethicone (Gas-X) 80 MG chewable tablet    pantoprazole (PROTONIX) 40 MG EC tablet      4. Gastroesophageal reflux disease without esophagitis  pantoprazole (PROTONIX) 40 MG EC tablet      5. Low vitamin B12 level  vitamin B-12 (CYANOCOBALAMIN) 1000 MCG tablet      6. Mixed hyperlipidemia  lovastatin (MEVACOR) 20 MG tablet    Comprehensive Metabolic Panel    Lipid Panel      7. Prediabetes  Hemoglobin A1c      8. Bilateral hand pain  Rheumatoid Factor    MICHELLE With / DsDNA, RNP, Sjogrens A / B, Smith    Sedimentation Rate    C-reactive Protein    EMG & Nerve Conduction Test    XR Hand 3+ View Left    XR Hand 3+ View Right    naproxen (Naprosyn) 500 MG tablet      9. Numbness and tingling in both hands  EMG & Nerve Conduction Test            FOLLOW UP  Return in about 6 months (around 4/7/2025) for Annual physical.  Patient was given instructions and counseling regarding his condition or for health maintenance advice. Please see specific information pulled into the AVS if appropriate.       CURRENT & DISCONTINUED MEDICATIONS  Current Outpatient Medications   Medication Instructions    albuterol sulfate  (90 Base) MCG/ACT inhaler 2 puffs, Inhalation, Every 4 Hours PRN    aspirin 81 mg, Oral, Daily    cetirizine (ZYRTEC) 10 mg, Oral, Daily    Dulera 200-5  MCG/ACT inhaler 2 puffs, Inhalation, 2 Times Daily    hydroCHLOROthiazide 25 mg, Oral, Daily    lisinopril (PRINIVIL,ZESTRIL) 40 mg, Oral, Daily    lovastatin (MEVACOR) 20 mg, Oral, Every Night at Bedtime    montelukast (SINGULAIR) 10 mg, Oral, Every Night at Bedtime    naproxen (NAPROSYN) 500 mg, Oral, 2 Times Daily With Meals    pantoprazole (PROTONIX) 40 mg, Oral, Daily    simethicone (GAS-X) 80 mg, Oral, Every 6 Hours PRN    Spiriva HandiHaler 18 MCG per inhalation capsule 1 capsule, Inhalation, Daily    Thiamine Mononitrate (B1) 100 MG tablet 1 tablet, Oral, Daily    vitamin B-12 (CYANOCOBALAMIN) 1,000 mcg, Oral, Daily       Medications Discontinued During This Encounter   Medication Reason    hydroCHLOROthiazide 25 MG tablet Reorder    lisinopril (PRINIVIL,ZESTRIL) 40 MG tablet Reorder    simethicone (Gas-X) 80 MG chewable tablet Reorder    pantoprazole (PROTONIX) 40 MG EC tablet Reorder    vitamin B-12 (CYANOCOBALAMIN) 1000 MCG tablet Reorder    lovastatin (MEVACOR) 20 MG tablet Reorder        Parts of this note are electronic transcriptions/translations of spoken language to printed text using the Dragon Dictation system.    Rekha Quevedo, RAY  10/07/24  09:11 EDT

## 2024-10-07 NOTE — ASSESSMENT & PLAN NOTE
He is currently stable on vitamin B12 tablets, will continue current dose, refill sent to pharmacy.

## 2024-10-07 NOTE — ASSESSMENT & PLAN NOTE
I will have him get x-rays of his hands.  Plan to refer him to Ortho after he completes x-rays and EMG.  I will prescribe him naproxen 500 mg twice daily as needed, advised to take with food.

## 2024-10-07 NOTE — ASSESSMENT & PLAN NOTE
Hypertension is borderline  Continue current treatment regimen.  Ambulatory blood pressure monitoring.  Advised patient to start monitoring blood pressure at home and to notify me if the blood pressure remains above 140/90.  Patient verbalizes understanding.  Blood pressure will be reassessedin 6 months.

## 2024-10-08 LAB — ANA SER QL: NEGATIVE

## 2024-11-05 ENCOUNTER — HOSPITAL ENCOUNTER (OUTPATIENT)
Dept: RESPIRATORY THERAPY | Facility: HOSPITAL | Age: 59
Discharge: HOME | End: 2024-11-05
Admitting: NURSE PRACTITIONER
Payer: MEDICAID

## 2024-11-05 DIAGNOSIS — J30.9 ALLERGIC RHINITIS, UNSPECIFIED SEASONALITY, UNSPECIFIED TRIGGER: ICD-10-CM

## 2024-11-05 DIAGNOSIS — J45.41 MODERATE PERSISTENT ASTHMA WITH ACUTE EXACERBATION: ICD-10-CM

## 2024-11-05 PROCEDURE — 94726 PLETHYSMOGRAPHY LUNG VOLUMES: CPT

## 2024-11-05 PROCEDURE — 94729 DIFFUSING CAPACITY: CPT

## 2024-11-05 PROCEDURE — 94060 EVALUATION OF WHEEZING: CPT

## 2024-11-05 RX ORDER — ALBUTEROL SULFATE 0.83 MG/ML
2.5 SOLUTION RESPIRATORY (INHALATION) ONCE
Status: COMPLETED | OUTPATIENT
Start: 2024-11-05 | End: 2024-11-05

## 2024-11-05 RX ADMIN — ALBUTEROL SULFATE 2.5 MG: 2.5 SOLUTION RESPIRATORY (INHALATION) at 08:15

## 2025-01-15 ENCOUNTER — TELEPHONE (OUTPATIENT)
Dept: FAMILY MEDICINE CLINIC | Facility: CLINIC | Age: 60
End: 2025-01-15

## 2025-01-15 NOTE — TELEPHONE ENCOUNTER
Spoke with patient, explained to him what the [facility] program was. Scheduling patient for 2/7/25 at 9:00AM.

## 2025-02-05 ENCOUNTER — TELEPHONE (OUTPATIENT)
Dept: FAMILY MEDICINE CLINIC | Facility: CLINIC | Age: 60
End: 2025-02-05
Payer: MEDICAID

## 2025-02-07 ENCOUNTER — CLINICAL SUPPORT (OUTPATIENT)
Dept: FAMILY MEDICINE CLINIC | Facility: CLINIC | Age: 60
End: 2025-02-07
Payer: MEDICAID

## 2025-02-07 VITALS
HEART RATE: 80 BPM | DIASTOLIC BLOOD PRESSURE: 74 MMHG | SYSTOLIC BLOOD PRESSURE: 103 MMHG | HEIGHT: 68 IN | BODY MASS INDEX: 22.73 KG/M2 | WEIGHT: 150 LBS

## 2025-02-07 DIAGNOSIS — I10 PRIMARY HYPERTENSION: Primary | ICD-10-CM

## 2025-02-07 NOTE — PROGRESS NOTES
Patient seen as Encounter 1 for [facility] Program.  Education provided through CARE [facility] Curriculum including .   BP was 103/74 with office BP machine and 104/66 with new Perez BP cuff.  Pt was provided Perez BP cuff [id number] and educated on use of the cuff.  Pt was also given pill organizer.  Pt was given BP log and instructed to provide BP readings twice daily until his next appointment.      Pt instructed to call PCP if symptomatic with any symptoms with BP reads. Pt verbalized understanding.      Pt next encounter will focus on Healthy Eating.  Next appointment scheduled for March 14th at 10am.

## 2025-03-11 ENCOUNTER — HOSPITAL ENCOUNTER (OUTPATIENT)
Dept: GENERAL RADIOLOGY | Facility: HOSPITAL | Age: 60
Discharge: HOME | End: 2025-03-11
Admitting: NURSE PRACTITIONER
Payer: MEDICAID

## 2025-03-11 DIAGNOSIS — M79.641 BILATERAL HAND PAIN: ICD-10-CM

## 2025-03-11 DIAGNOSIS — M79.642 BILATERAL HAND PAIN: ICD-10-CM

## 2025-03-11 PROCEDURE — 73130 X-RAY EXAM OF HAND: CPT

## 2025-03-13 ENCOUNTER — RESULTS FOLLOW-UP (OUTPATIENT)
Dept: GENERAL RADIOLOGY | Facility: HOSPITAL | Age: 60
End: 2025-03-13
Payer: MEDICAID

## 2025-03-13 NOTE — PROGRESS NOTES
Bilateral hands do show some severe arthritis.  I had also ordered an EMG nerve conduction study for him to get done to check for carpal tunnel syndrome, but he has not been scheduled for that yet.  We need to get that scheduled.

## 2025-03-14 ENCOUNTER — CLINICAL SUPPORT (OUTPATIENT)
Dept: FAMILY MEDICINE CLINIC | Facility: CLINIC | Age: 60
End: 2025-03-14
Payer: MEDICAID

## 2025-03-14 VITALS
BODY MASS INDEX: 22.13 KG/M2 | DIASTOLIC BLOOD PRESSURE: 85 MMHG | HEIGHT: 68 IN | WEIGHT: 146 LBS | SYSTOLIC BLOOD PRESSURE: 134 MMHG

## 2025-03-14 DIAGNOSIS — I10 ESSENTIAL HYPERTENSION: Primary | ICD-10-CM

## 2025-03-14 NOTE — PROGRESS NOTES
Patient seen a part of the CARE[facility] program. Bp on this visit was 134/85. Pt brought in log with recorded bp log was scanned and uploaded to his chart. Learning materials for healthy eating reviewed with patient and folder and my plate diet aide given. Pt to return on 3/26/2025 at 2pm  visit to focus on Lowering Cholesterol.

## 2025-03-21 ENCOUNTER — HOSPITAL ENCOUNTER (INPATIENT)
Facility: HOSPITAL | Age: 60
LOS: 1 days | Discharge: HOME | End: 2025-03-23
Attending: EMERGENCY MEDICINE | Admitting: INTERNAL MEDICINE
Payer: MEDICAID

## 2025-03-21 ENCOUNTER — APPOINTMENT (OUTPATIENT)
Dept: GENERAL RADIOLOGY | Facility: HOSPITAL | Age: 60
End: 2025-03-21
Payer: MEDICAID

## 2025-03-21 ENCOUNTER — APPOINTMENT (OUTPATIENT)
Dept: CT IMAGING | Facility: HOSPITAL | Age: 60
End: 2025-03-21
Payer: MEDICAID

## 2025-03-21 DIAGNOSIS — E87.1 HYPONATREMIA: ICD-10-CM

## 2025-03-21 DIAGNOSIS — R41.0 ACUTE DELIRIUM: Primary | ICD-10-CM

## 2025-03-21 LAB
ALBUMIN SERPL-MCNC: 4.7 G/DL (ref 3.5–5.2)
ALBUMIN/GLOB SERPL: 1.7 G/DL
ALP SERPL-CCNC: 63 U/L (ref 39–117)
ALT SERPL W P-5'-P-CCNC: 22 U/L (ref 1–41)
AMPHET+METHAMPHET UR QL: NEGATIVE
AMPHETAMINES UR QL: NEGATIVE
ANION GAP SERPL CALCULATED.3IONS-SCNC: 15.2 MMOL/L (ref 5–15)
ARTERIAL PATENCY WRIST A: POSITIVE
AST SERPL-CCNC: 29 U/L (ref 1–40)
ATMOSPHERIC PRESS: 742 MMHG
BARBITURATES UR QL SCN: NEGATIVE
BASE EXCESS BLDA CALC-SCNC: -0.8 MMOL/L (ref -2–2)
BASOPHILS # BLD AUTO: 0.06 10*3/MM3 (ref 0–0.2)
BASOPHILS NFR BLD AUTO: 0.5 % (ref 0–1.5)
BDY SITE: ABNORMAL
BENZODIAZ UR QL SCN: NEGATIVE
BILIRUB SERPL-MCNC: 0.2 MG/DL (ref 0–1.2)
BILIRUB UR QL STRIP: NEGATIVE
BUN SERPL-MCNC: 15 MG/DL (ref 8–23)
BUN/CREAT SERPL: 10.9 (ref 7–25)
BUPRENORPHINE SERPL-MCNC: NEGATIVE NG/ML
CA-I BLDA-SCNC: 1.1 MMOL/L (ref 1.13–1.32)
CALCIUM SPEC-SCNC: 9.9 MG/DL (ref 8.6–10.5)
CANNABINOIDS SERPL QL: POSITIVE
CHLORIDE BLDA-SCNC: 91 MMOL/L (ref 98–107)
CHLORIDE SERPL-SCNC: 86 MMOL/L (ref 98–107)
CLARITY UR: CLEAR
CO2 SERPL-SCNC: 21.8 MMOL/L (ref 22–29)
COCAINE UR QL: NEGATIVE
COLOR UR: YELLOW
CREAT SERPL-MCNC: 1.38 MG/DL (ref 0.76–1.27)
D-LACTATE SERPL-SCNC: 0.6 MMOL/L
DEPRECATED RDW RBC AUTO: 42 FL (ref 37–54)
EGFRCR SERPLBLD CKD-EPI 2021: 58.5 ML/MIN/1.73
EOSINOPHIL # BLD AUTO: 0.09 10*3/MM3 (ref 0–0.4)
EOSINOPHIL NFR BLD AUTO: 0.8 % (ref 0.3–6.2)
ERYTHROCYTE [DISTWIDTH] IN BLOOD BY AUTOMATED COUNT: 12.8 % (ref 12.3–15.4)
FENTANYL UR-MCNC: NEGATIVE NG/ML
GEN 5 1HR TROPONIN T REFLEX: 19 NG/L
GLOBULIN UR ELPH-MCNC: 2.8 GM/DL
GLUCOSE BLDC GLUCOMTR-MCNC: 151 MG/DL (ref 70–99)
GLUCOSE SERPL-MCNC: 163 MG/DL (ref 65–99)
GLUCOSE UR STRIP-MCNC: NEGATIVE MG/DL
HCO3 BLDA-SCNC: 23 MMOL/L (ref 22–26)
HCT VFR BLD AUTO: 32.6 % (ref 37.5–51)
HCT VFR BLD CALC: 33 % (ref 38–51)
HEMODILUTION: NO
HGB BLD-MCNC: 11.3 G/DL (ref 13–17.7)
HGB BLDA-MCNC: 11.2 G/DL (ref 12–18)
HGB UR QL STRIP.AUTO: NEGATIVE
HOLD SPECIMEN: NORMAL
HOLD SPECIMEN: NORMAL
IMM GRANULOCYTES # BLD AUTO: 0.05 10*3/MM3 (ref 0–0.05)
IMM GRANULOCYTES NFR BLD AUTO: 0.4 % (ref 0–0.5)
INHALED O2 CONCENTRATION: 21 %
KETONES UR QL STRIP: ABNORMAL
LEUKOCYTE ESTERASE UR QL STRIP.AUTO: NEGATIVE
LYMPHOCYTES # BLD AUTO: 0.87 10*3/MM3 (ref 0.7–3.1)
LYMPHOCYTES NFR BLD AUTO: 7.5 % (ref 19.6–45.3)
MAGNESIUM SERPL-MCNC: 2.1 MG/DL (ref 1.6–2.4)
MCH RBC QN AUTO: 30.8 PG (ref 26.6–33)
MCHC RBC AUTO-ENTMCNC: 34.7 G/DL (ref 31.5–35.7)
MCV RBC AUTO: 88.8 FL (ref 79–97)
METHADONE UR QL SCN: NEGATIVE
MODALITY: ABNORMAL
MONOCYTES # BLD AUTO: 0.96 10*3/MM3 (ref 0.1–0.9)
MONOCYTES NFR BLD AUTO: 8.3 % (ref 5–12)
NEUTROPHILS NFR BLD AUTO: 82.5 % (ref 42.7–76)
NEUTROPHILS NFR BLD AUTO: 9.51 10*3/MM3 (ref 1.7–7)
NITRITE UR QL STRIP: NEGATIVE
NRBC BLD AUTO-RTO: 0 /100 WBC (ref 0–0.2)
OPIATES UR QL: NEGATIVE
OXYCODONE UR QL SCN: NEGATIVE
PCO2 BLDA: 34.3 MM HG (ref 35–45)
PCP UR QL SCN: NEGATIVE
PH BLDA: 7.43 PH UNITS (ref 7.35–7.45)
PH UR STRIP.AUTO: 6 [PH] (ref 5–8)
PLATELET # BLD AUTO: 371 10*3/MM3 (ref 140–450)
PMV BLD AUTO: 8.5 FL (ref 6–12)
PO2 BLD: 375 MM[HG] (ref 0–500)
PO2 BLDA: 78.8 MM HG (ref 80–100)
POTASSIUM BLDA-SCNC: 4.1 MMOL/L (ref 3.5–5)
POTASSIUM SERPL-SCNC: 4.2 MMOL/L (ref 3.5–5.2)
PROT SERPL-MCNC: 7.5 G/DL (ref 6–8.5)
PROT UR QL STRIP: NEGATIVE
QT INTERVAL: 360 MS
QTC INTERVAL: 450 MS
RBC # BLD AUTO: 3.67 10*6/MM3 (ref 4.14–5.8)
SAO2 % BLDCOA: 96.1 % (ref 95–99)
SODIUM BLD-SCNC: 125 MMOL/L (ref 131–143)
SODIUM SERPL-SCNC: 123 MMOL/L (ref 136–145)
SP GR UR STRIP: 1.01 (ref 1–1.03)
TRICYCLICS UR QL SCN: NEGATIVE
TROPONIN T NUMERIC DELTA: 2 NG/L
TROPONIN T SERPL HS-MCNC: 17 NG/L
UROBILINOGEN UR QL STRIP: ABNORMAL
WBC NRBC COR # BLD AUTO: 11.54 10*3/MM3 (ref 3.4–10.8)
WHOLE BLOOD HOLD COAG: NORMAL
WHOLE BLOOD HOLD SPECIMEN: NORMAL

## 2025-03-21 PROCEDURE — 70450 CT HEAD/BRAIN W/O DYE: CPT

## 2025-03-21 PROCEDURE — 71045 X-RAY EXAM CHEST 1 VIEW: CPT

## 2025-03-21 PROCEDURE — 82803 BLOOD GASES ANY COMBINATION: CPT | Performed by: EMERGENCY MEDICINE

## 2025-03-21 PROCEDURE — 25810000003 SODIUM CHLORIDE 0.9 % SOLUTION: Performed by: EMERGENCY MEDICINE

## 2025-03-21 PROCEDURE — 83735 ASSAY OF MAGNESIUM: CPT | Performed by: EMERGENCY MEDICINE

## 2025-03-21 PROCEDURE — 93010 ELECTROCARDIOGRAM REPORT: CPT | Performed by: INTERNAL MEDICINE

## 2025-03-21 PROCEDURE — 85025 COMPLETE CBC W/AUTO DIFF WBC: CPT | Performed by: EMERGENCY MEDICINE

## 2025-03-21 PROCEDURE — P9612 CATHETERIZE FOR URINE SPEC: HCPCS

## 2025-03-21 PROCEDURE — 81003 URINALYSIS AUTO W/O SCOPE: CPT | Performed by: EMERGENCY MEDICINE

## 2025-03-21 PROCEDURE — 82330 ASSAY OF CALCIUM: CPT

## 2025-03-21 PROCEDURE — 80051 ELECTROLYTE PANEL: CPT

## 2025-03-21 PROCEDURE — 82948 REAGENT STRIP/BLOOD GLUCOSE: CPT | Performed by: EMERGENCY MEDICINE

## 2025-03-21 PROCEDURE — 36600 WITHDRAWAL OF ARTERIAL BLOOD: CPT | Performed by: EMERGENCY MEDICINE

## 2025-03-21 PROCEDURE — 80307 DRUG TEST PRSMV CHEM ANLYZR: CPT | Performed by: EMERGENCY MEDICINE

## 2025-03-21 PROCEDURE — 93005 ELECTROCARDIOGRAM TRACING: CPT | Performed by: EMERGENCY MEDICINE

## 2025-03-21 PROCEDURE — 80053 COMPREHEN METABOLIC PANEL: CPT | Performed by: EMERGENCY MEDICINE

## 2025-03-21 PROCEDURE — 36415 COLL VENOUS BLD VENIPUNCTURE: CPT

## 2025-03-21 PROCEDURE — 83605 ASSAY OF LACTIC ACID: CPT

## 2025-03-21 PROCEDURE — 84484 ASSAY OF TROPONIN QUANT: CPT | Performed by: EMERGENCY MEDICINE

## 2025-03-21 PROCEDURE — 99285 EMERGENCY DEPT VISIT HI MDM: CPT

## 2025-03-21 RX ORDER — ACETAMINOPHEN 325 MG/1
650 TABLET ORAL ONCE
Status: COMPLETED | OUTPATIENT
Start: 2025-03-21 | End: 2025-03-21

## 2025-03-21 RX ORDER — SODIUM CHLORIDE 0.9 % (FLUSH) 0.9 %
10 SYRINGE (ML) INJECTION AS NEEDED
Status: DISCONTINUED | OUTPATIENT
Start: 2025-03-21 | End: 2025-03-23 | Stop reason: HOSPADM

## 2025-03-21 RX ADMIN — SODIUM CHLORIDE 1000 ML: 0.9 INJECTION, SOLUTION INTRAVENOUS at 20:44

## 2025-03-21 RX ADMIN — ACETAMINOPHEN 650 MG: 325 TABLET ORAL at 23:24

## 2025-03-22 PROBLEM — E87.1 HYPONATREMIA: Status: ACTIVE | Noted: 2025-03-22

## 2025-03-22 PROBLEM — R41.82 AMS (ALTERED MENTAL STATUS): Status: ACTIVE | Noted: 2025-03-22

## 2025-03-22 PROBLEM — R41.0 ACUTE DELIRIUM: Status: ACTIVE | Noted: 2025-03-22

## 2025-03-22 LAB
AMMONIA BLD-SCNC: 20 UMOL/L (ref 16–60)
ANION GAP SERPL CALCULATED.3IONS-SCNC: 11.6 MMOL/L (ref 5–15)
ANION GAP SERPL CALCULATED.3IONS-SCNC: 14 MMOL/L (ref 5–15)
ANION GAP SERPL CALCULATED.3IONS-SCNC: 9.2 MMOL/L (ref 5–15)
BUN SERPL-MCNC: 12 MG/DL (ref 8–23)
BUN SERPL-MCNC: 13 MG/DL (ref 8–23)
BUN SERPL-MCNC: 14 MG/DL (ref 8–23)
BUN/CREAT SERPL: 11.8 (ref 7–25)
BUN/CREAT SERPL: 12.1 (ref 7–25)
BUN/CREAT SERPL: 12.1 (ref 7–25)
CALCIUM SPEC-SCNC: 8.8 MG/DL (ref 8.6–10.5)
CALCIUM SPEC-SCNC: 9.2 MG/DL (ref 8.6–10.5)
CALCIUM SPEC-SCNC: 9.2 MG/DL (ref 8.6–10.5)
CHLORIDE SERPL-SCNC: 92 MMOL/L (ref 98–107)
CHLORIDE SERPL-SCNC: 94 MMOL/L (ref 98–107)
CHLORIDE SERPL-SCNC: 95 MMOL/L (ref 98–107)
CO2 SERPL-SCNC: 22 MMOL/L (ref 22–29)
CO2 SERPL-SCNC: 23.4 MMOL/L (ref 22–29)
CO2 SERPL-SCNC: 23.8 MMOL/L (ref 22–29)
CREAT SERPL-MCNC: 0.99 MG/DL (ref 0.76–1.27)
CREAT SERPL-MCNC: 1.1 MG/DL (ref 0.76–1.27)
CREAT SERPL-MCNC: 1.16 MG/DL (ref 0.76–1.27)
DEPRECATED RDW RBC AUTO: 42.4 FL (ref 37–54)
EGFRCR SERPLBLD CKD-EPI 2021: 72.1 ML/MIN/1.73
EGFRCR SERPLBLD CKD-EPI 2021: 76.9 ML/MIN/1.73
EGFRCR SERPLBLD CKD-EPI 2021: 87.2 ML/MIN/1.73
ERYTHROCYTE [DISTWIDTH] IN BLOOD BY AUTOMATED COUNT: 12.9 % (ref 12.3–15.4)
ETHANOL BLD-MCNC: <10 MG/DL (ref 0–10)
ETHANOL UR QL: <0.01 %
FOLATE SERPL-MCNC: 18.6 NG/ML (ref 4.78–24.2)
GLUCOSE SERPL-MCNC: 116 MG/DL (ref 65–99)
GLUCOSE SERPL-MCNC: 125 MG/DL (ref 65–99)
GLUCOSE SERPL-MCNC: 139 MG/DL (ref 65–99)
HCT VFR BLD AUTO: 30.7 % (ref 37.5–51)
HGB BLD-MCNC: 10.9 G/DL (ref 13–17.7)
MCH RBC QN AUTO: 31.9 PG (ref 26.6–33)
MCHC RBC AUTO-ENTMCNC: 35.5 G/DL (ref 31.5–35.7)
MCV RBC AUTO: 89.8 FL (ref 79–97)
PLATELET # BLD AUTO: 359 10*3/MM3 (ref 140–450)
PMV BLD AUTO: 8.3 FL (ref 6–12)
POTASSIUM SERPL-SCNC: 3.8 MMOL/L (ref 3.5–5.2)
POTASSIUM SERPL-SCNC: 4 MMOL/L (ref 3.5–5.2)
POTASSIUM SERPL-SCNC: 4 MMOL/L (ref 3.5–5.2)
POTASSIUM UR-SCNC: 27.5 MMOL/L
RBC # BLD AUTO: 3.42 10*6/MM3 (ref 4.14–5.8)
SODIUM SERPL-SCNC: 127 MMOL/L (ref 136–145)
SODIUM SERPL-SCNC: 128 MMOL/L (ref 136–145)
SODIUM SERPL-SCNC: 130 MMOL/L (ref 136–145)
SODIUM UR-SCNC: 64 MMOL/L
TSH SERPL DL<=0.05 MIU/L-ACNC: 1.34 UIU/ML (ref 0.27–4.2)
WBC NRBC COR # BLD AUTO: 6.1 10*3/MM3 (ref 3.4–10.8)

## 2025-03-22 PROCEDURE — 96375 TX/PRO/DX INJ NEW DRUG ADDON: CPT

## 2025-03-22 PROCEDURE — 82077 ASSAY SPEC XCP UR&BREATH IA: CPT | Performed by: EMERGENCY MEDICINE

## 2025-03-22 PROCEDURE — 84443 ASSAY THYROID STIM HORMONE: CPT | Performed by: INTERNAL MEDICINE

## 2025-03-22 PROCEDURE — 96374 THER/PROPH/DIAG INJ IV PUSH: CPT

## 2025-03-22 PROCEDURE — 84300 ASSAY OF URINE SODIUM: CPT | Performed by: FAMILY MEDICINE

## 2025-03-22 PROCEDURE — 99222 1ST HOSP IP/OBS MODERATE 55: CPT | Performed by: FAMILY MEDICINE

## 2025-03-22 PROCEDURE — 96376 TX/PRO/DX INJ SAME DRUG ADON: CPT

## 2025-03-22 PROCEDURE — 87484 EHRLICHA CHAFFEENSIS AMP PRB: CPT | Performed by: INTERNAL MEDICINE

## 2025-03-22 PROCEDURE — 87468 ANAPLSMA PHGCYTOPHLM AMP PRB: CPT | Performed by: INTERNAL MEDICINE

## 2025-03-22 PROCEDURE — 96361 HYDRATE IV INFUSION ADD-ON: CPT

## 2025-03-22 PROCEDURE — 25010000002 THIAMINE PER 100 MG: Performed by: PHYSICIAN ASSISTANT

## 2025-03-22 PROCEDURE — 82140 ASSAY OF AMMONIA: CPT | Performed by: INTERNAL MEDICINE

## 2025-03-22 PROCEDURE — 80048 BASIC METABOLIC PNL TOTAL CA: CPT | Performed by: FAMILY MEDICINE

## 2025-03-22 PROCEDURE — 25010000002 LORAZEPAM PER 2 MG: Performed by: PHYSICIAN ASSISTANT

## 2025-03-22 PROCEDURE — 96372 THER/PROPH/DIAG INJ SC/IM: CPT

## 2025-03-22 PROCEDURE — 25010000002 ENOXAPARIN PER 10 MG: Performed by: FAMILY MEDICINE

## 2025-03-22 PROCEDURE — 94799 UNLISTED PULMONARY SVC/PX: CPT

## 2025-03-22 PROCEDURE — 84133 ASSAY OF URINE POTASSIUM: CPT | Performed by: FAMILY MEDICINE

## 2025-03-22 PROCEDURE — 25010000002 LORAZEPAM PER 2 MG

## 2025-03-22 PROCEDURE — 80048 BASIC METABOLIC PNL TOTAL CA: CPT | Performed by: INTERNAL MEDICINE

## 2025-03-22 PROCEDURE — 86618 LYME DISEASE ANTIBODY: CPT | Performed by: INTERNAL MEDICINE

## 2025-03-22 PROCEDURE — 85027 COMPLETE CBC AUTOMATED: CPT | Performed by: FAMILY MEDICINE

## 2025-03-22 PROCEDURE — 25810000003 SODIUM CHLORIDE 0.9 % SOLUTION: Performed by: FAMILY MEDICINE

## 2025-03-22 PROCEDURE — 82746 ASSAY OF FOLIC ACID SERUM: CPT | Performed by: INTERNAL MEDICINE

## 2025-03-22 PROCEDURE — 87798 DETECT AGENT NOS DNA AMP: CPT | Performed by: INTERNAL MEDICINE

## 2025-03-22 RX ORDER — LORAZEPAM 2 MG/ML
1 INJECTION INTRAMUSCULAR
Status: DISCONTINUED | OUTPATIENT
Start: 2025-03-22 | End: 2025-03-23 | Stop reason: HOSPADM

## 2025-03-22 RX ORDER — LORAZEPAM 2 MG/1
2 TABLET ORAL
Status: DISCONTINUED | OUTPATIENT
Start: 2025-03-22 | End: 2025-03-23 | Stop reason: HOSPADM

## 2025-03-22 RX ORDER — ARFORMOTEROL TARTRATE 15 UG/2ML
15 SOLUTION RESPIRATORY (INHALATION)
Status: DISCONTINUED | OUTPATIENT
Start: 2025-03-22 | End: 2025-03-22

## 2025-03-22 RX ORDER — SODIUM CHLORIDE 9 MG/ML
100 INJECTION, SOLUTION INTRAVENOUS CONTINUOUS
Status: DISCONTINUED | OUTPATIENT
Start: 2025-03-22 | End: 2025-03-22

## 2025-03-22 RX ORDER — SODIUM CHLORIDE 0.9 % (FLUSH) 0.9 %
10 SYRINGE (ML) INJECTION AS NEEDED
Status: DISCONTINUED | OUTPATIENT
Start: 2025-03-22 | End: 2025-03-23 | Stop reason: HOSPADM

## 2025-03-22 RX ORDER — MONTELUKAST SODIUM 10 MG/1
10 TABLET ORAL NIGHTLY
Status: DISCONTINUED | OUTPATIENT
Start: 2025-03-22 | End: 2025-03-23 | Stop reason: HOSPADM

## 2025-03-22 RX ORDER — CETIRIZINE HYDROCHLORIDE 10 MG/1
10 TABLET ORAL DAILY
Status: DISCONTINUED | OUTPATIENT
Start: 2025-03-22 | End: 2025-03-23 | Stop reason: HOSPADM

## 2025-03-22 RX ORDER — POLYETHYLENE GLYCOL 3350 17 G/17G
17 POWDER, FOR SOLUTION ORAL DAILY PRN
Status: DISCONTINUED | OUTPATIENT
Start: 2025-03-22 | End: 2025-03-23 | Stop reason: HOSPADM

## 2025-03-22 RX ORDER — ENOXAPARIN SODIUM 100 MG/ML
40 INJECTION SUBCUTANEOUS DAILY
Status: DISCONTINUED | OUTPATIENT
Start: 2025-03-22 | End: 2025-03-23 | Stop reason: HOSPADM

## 2025-03-22 RX ORDER — SODIUM CHLORIDE 9 MG/ML
40 INJECTION, SOLUTION INTRAVENOUS AS NEEDED
Status: DISCONTINUED | OUTPATIENT
Start: 2025-03-22 | End: 2025-03-23 | Stop reason: HOSPADM

## 2025-03-22 RX ORDER — IPRATROPIUM BROMIDE AND ALBUTEROL SULFATE 2.5; .5 MG/3ML; MG/3ML
3 SOLUTION RESPIRATORY (INHALATION) EVERY 4 HOURS PRN
Status: DISCONTINUED | OUTPATIENT
Start: 2025-03-22 | End: 2025-03-22

## 2025-03-22 RX ORDER — LORAZEPAM 0.5 MG/1
1 TABLET ORAL
Status: DISCONTINUED | OUTPATIENT
Start: 2025-03-22 | End: 2025-03-23 | Stop reason: HOSPADM

## 2025-03-22 RX ORDER — BISACODYL 10 MG
10 SUPPOSITORY, RECTAL RECTAL DAILY PRN
Status: DISCONTINUED | OUTPATIENT
Start: 2025-03-22 | End: 2025-03-23 | Stop reason: HOSPADM

## 2025-03-22 RX ORDER — PANTOPRAZOLE SODIUM 40 MG/10ML
40 INJECTION, POWDER, LYOPHILIZED, FOR SOLUTION INTRAVENOUS
Status: DISCONTINUED | OUTPATIENT
Start: 2025-03-22 | End: 2025-03-23 | Stop reason: HOSPADM

## 2025-03-22 RX ORDER — BUDESONIDE 0.5 MG/2ML
0.5 INHALANT ORAL
Status: DISCONTINUED | OUTPATIENT
Start: 2025-03-22 | End: 2025-03-22

## 2025-03-22 RX ORDER — SODIUM CHLORIDE 0.9 % (FLUSH) 0.9 %
10 SYRINGE (ML) INJECTION EVERY 12 HOURS SCHEDULED
Status: DISCONTINUED | OUTPATIENT
Start: 2025-03-22 | End: 2025-03-23 | Stop reason: HOSPADM

## 2025-03-22 RX ORDER — FOLIC ACID 1 MG/1
1 TABLET ORAL DAILY
Status: DISCONTINUED | OUTPATIENT
Start: 2025-03-22 | End: 2025-03-23 | Stop reason: HOSPADM

## 2025-03-22 RX ORDER — SIMETHICONE 80 MG
80 TABLET,CHEWABLE ORAL EVERY 6 HOURS PRN
Status: DISCONTINUED | OUTPATIENT
Start: 2025-03-22 | End: 2025-03-23 | Stop reason: HOSPADM

## 2025-03-22 RX ORDER — ATORVASTATIN CALCIUM 10 MG/1
10 TABLET, FILM COATED ORAL NIGHTLY
Status: DISCONTINUED | OUTPATIENT
Start: 2025-03-22 | End: 2025-03-23 | Stop reason: HOSPADM

## 2025-03-22 RX ORDER — TIOTROPIUM BROMIDE 18 UG/1
1 CAPSULE ORAL; RESPIRATORY (INHALATION)
Status: DISCONTINUED | OUTPATIENT
Start: 2025-03-22 | End: 2025-03-23 | Stop reason: HOSPADM

## 2025-03-22 RX ORDER — LISINOPRIL 20 MG/1
40 TABLET ORAL DAILY
Status: DISCONTINUED | OUTPATIENT
Start: 2025-03-22 | End: 2025-03-23 | Stop reason: HOSPADM

## 2025-03-22 RX ORDER — LORAZEPAM 2 MG/ML
2 INJECTION INTRAMUSCULAR
Status: DISCONTINUED | OUTPATIENT
Start: 2025-03-22 | End: 2025-03-23 | Stop reason: HOSPADM

## 2025-03-22 RX ORDER — ALBUTEROL SULFATE 90 UG/1
2 INHALANT RESPIRATORY (INHALATION) EVERY 4 HOURS PRN
Status: DISCONTINUED | OUTPATIENT
Start: 2025-03-22 | End: 2025-03-23 | Stop reason: HOSPADM

## 2025-03-22 RX ORDER — LORAZEPAM 2 MG/ML
1 INJECTION INTRAMUSCULAR ONCE
Status: DISCONTINUED | OUTPATIENT
Start: 2025-03-22 | End: 2025-03-22

## 2025-03-22 RX ORDER — ONDANSETRON 2 MG/ML
4 INJECTION INTRAMUSCULAR; INTRAVENOUS EVERY 6 HOURS PRN
Status: DISCONTINUED | OUTPATIENT
Start: 2025-03-22 | End: 2025-03-23 | Stop reason: HOSPADM

## 2025-03-22 RX ORDER — THIAMINE HYDROCHLORIDE 100 MG/ML
200 INJECTION, SOLUTION INTRAMUSCULAR; INTRAVENOUS EVERY 8 HOURS SCHEDULED
Status: DISCONTINUED | OUTPATIENT
Start: 2025-03-22 | End: 2025-03-23 | Stop reason: HOSPADM

## 2025-03-22 RX ORDER — LABETALOL HYDROCHLORIDE 5 MG/ML
10 INJECTION, SOLUTION INTRAVENOUS EVERY 4 HOURS PRN
Status: DISCONTINUED | OUTPATIENT
Start: 2025-03-22 | End: 2025-03-23 | Stop reason: HOSPADM

## 2025-03-22 RX ORDER — BISACODYL 5 MG/1
5 TABLET, DELAYED RELEASE ORAL DAILY PRN
Status: DISCONTINUED | OUTPATIENT
Start: 2025-03-22 | End: 2025-03-23 | Stop reason: HOSPADM

## 2025-03-22 RX ORDER — LORAZEPAM 2 MG/ML
INJECTION INTRAMUSCULAR
Status: COMPLETED
Start: 2025-03-22 | End: 2025-03-22

## 2025-03-22 RX ORDER — AMOXICILLIN 250 MG
2 CAPSULE ORAL 2 TIMES DAILY PRN
Status: DISCONTINUED | OUTPATIENT
Start: 2025-03-22 | End: 2025-03-23 | Stop reason: HOSPADM

## 2025-03-22 RX ADMIN — CETIRIZINE HYDROCHLORIDE 10 MG: 10 TABLET, FILM COATED ORAL at 17:42

## 2025-03-22 RX ADMIN — MONTELUKAST 10 MG: 10 TABLET, FILM COATED ORAL at 21:02

## 2025-03-22 RX ADMIN — Medication 10 ML: at 08:47

## 2025-03-22 RX ADMIN — THIAMINE HYDROCHLORIDE 200 MG: 100 INJECTION, SOLUTION INTRAMUSCULAR; INTRAVENOUS at 21:02

## 2025-03-22 RX ADMIN — LISINOPRIL 40 MG: 20 TABLET ORAL at 17:42

## 2025-03-22 RX ADMIN — THIAMINE HYDROCHLORIDE 200 MG: 100 INJECTION, SOLUTION INTRAMUSCULAR; INTRAVENOUS at 14:14

## 2025-03-22 RX ADMIN — SODIUM CHLORIDE 100 ML/HR: 0.9 INJECTION, SOLUTION INTRAVENOUS at 03:37

## 2025-03-22 RX ADMIN — LORAZEPAM 2 MG: 2 INJECTION INTRAMUSCULAR; INTRAVENOUS at 03:04

## 2025-03-22 RX ADMIN — THIAMINE HYDROCHLORIDE 200 MG: 100 INJECTION, SOLUTION INTRAMUSCULAR; INTRAVENOUS at 05:38

## 2025-03-22 RX ADMIN — Medication 10 ML: at 03:05

## 2025-03-22 RX ADMIN — FOLIC ACID 1 MG: 1 TABLET ORAL at 08:47

## 2025-03-22 RX ADMIN — ENOXAPARIN SODIUM 40 MG: 100 INJECTION SUBCUTANEOUS at 08:47

## 2025-03-22 RX ADMIN — SODIUM CHLORIDE 100 ML/HR: 0.9 INJECTION, SOLUTION INTRAVENOUS at 05:39

## 2025-03-22 RX ADMIN — PANTOPRAZOLE SODIUM 40 MG: 40 INJECTION, POWDER, FOR SOLUTION INTRAVENOUS at 03:05

## 2025-03-22 RX ADMIN — Medication 10 ML: at 21:02

## 2025-03-22 RX ADMIN — LORAZEPAM 2 MG: 2 INJECTION INTRAMUSCULAR; INTRAVENOUS at 02:47

## 2025-03-22 RX ADMIN — TIOTROPIUM BROMIDE 1 CAPSULE: 18 CAPSULE ORAL; RESPIRATORY (INHALATION) at 20:08

## 2025-03-22 RX ADMIN — ATORVASTATIN CALCIUM 10 MG: 10 TABLET, FILM COATED ORAL at 21:02

## 2025-03-22 NOTE — ED PROVIDER NOTES
"Time: 1:32 AM EDT  Date of encounter:  3/21/2025  Independent Historian/Clinical History and Information was obtained by:   Patient, Family, Nursing Staff, and EMS    History is limited by: Altered Mental Status    Chief Complaint: Mental status change      History of Present Illness:  Patient is a 59 y.o. year old male who presents to the emergency department for evaluation of altered mental status.  Patient arrives altered but is answering questions periodically.      Patient Care Team  Primary Care Provider: Rekha Quevedo APRN    Past Medical History:     No Known Allergies  Past Medical History:   Diagnosis Date    Abdominal pain     Alcohol abuse 01/28/2021    Allergic rhinitis due to allergen 10/24/2018    Asthma     Black stool     C. difficile diarrhea     Essential hypertension 10/24/2018    GERD (gastroesophageal reflux disease) 10/24/2018    High cholesterol     History of urinary retention     post op    Hyperlipidemia 10/24/2018    Hypertension     Inguinal hernia     Laryngeal candidiasis     Voice hoarseness      Past Surgical History:   Procedure Laterality Date    COLONOSCOPY N/A 10/30/2023    Procedure: COLONOSCOPY with POLYPECTOMY;  Surgeon: Orlando Leon MD;  Location: [facility] ENDOSCOPY;  Service: General;  Laterality: N/A;  COLON POLYP, HEMORRHOIDS    CYST REMOVAL      Back of neck    ENDOSCOPY N/A 10/30/2023    Procedure: ESOPHAGOGASTRODUODENOSCOPY WITH BIOPSIES;  Surgeon: Orlando Leon MD;  Location: [facility] ENDOSCOPY;  Service: General;  Laterality: N/A;  NORMAL    HERNIA REPAIR      \"30 years ago\"    INGUINAL HERNIA REPAIR Right 8/11/2021    Procedure: INGUINAL HERNIA REPAIR LAPAROSCOPIC WITH DAVINCI ROBOT;  Surgeon: Orlando Leon MD;  Location: [facility] MAIN OR;  Service: Robotics - DaVinci;  Laterality: Right;     Family History   Problem Relation Age of Onset    Heart disease Mother     Heart disease Father     Heart disease Brother        Home Medications:  Prior to " Admission medications    Medication Sig Start Date End Date Taking? Authorizing Provider   albuterol sulfate  (90 Base) MCG/ACT inhaler Inhale 2 puffs Every 4 (Four) Hours As Needed for Wheezing or Shortness of Air. 8/20/24   Emelina Bautista APRN   aspirin 81 MG EC tablet Take 1 tablet by mouth Daily. 1/30/23   RED Garcia MD   cetirizine (zyrTEC) 10 MG tablet Take 1 tablet by mouth Daily. 8/20/24   Emelina Bautista APRN   Dulera 200-5 MCG/ACT inhaler Inhale 2 puffs 2 (Two) Times a Day. 8/20/24   Emelina Bautista APRN   hydroCHLOROthiazide 25 MG tablet Take 1 tablet by mouth Daily. Indications: High Blood Pressure 10/7/24   Rekha Quevedo APRN   lisinopril (PRINIVIL,ZESTRIL) 40 MG tablet Take 1 tablet by mouth Daily. Indications: High Blood Pressure 10/7/24   Rekha Quevedo APRN   lovastatin (MEVACOR) 20 MG tablet Take 1 tablet by mouth every night at bedtime. Indications: High Amount of Fats in the Blood 10/7/24   Rekha Quevedo APRN   montelukast (SINGULAIR) 10 MG tablet Take 1 tablet by mouth every night at bedtime. 8/20/24   Emelina Bautista APRN   naproxen (Naprosyn) 500 MG tablet Take 1 tablet by mouth 2 (Two) Times a Day With Meals. Indications: hand pain 10/7/24   Rekha Quevedo APRN   pantoprazole (PROTONIX) 40 MG EC tablet Take 1 tablet by mouth Daily. Indications: Gastroesophageal Reflux Disease 10/7/24   Rekha Quevedo APRN   simethicone (Gas-X) 80 MG chewable tablet Chew 1 tablet Every 6 (Six) Hours As Needed for Flatulence. 10/7/24   Rekha Quevedo APRN   Spiriva HandiHaler 18 MCG per inhalation capsule Place 1 capsule into inhaler and inhale Daily. 8/20/24   Emelina Bautista APRN   Thiamine Mononitrate (B1) 100 MG tablet Take 1 tablet by mouth Daily. 1/23/24   Provider, MD Soco   vitamin B-12 (CYANOCOBALAMIN) 1000 MCG tablet Take 1 tablet by mouth Daily. Indications: Inadequate Vitamin B12 10/7/24   Rekha Quevedo APRN        Social History:  "  Social History     Tobacco Use    Smoking status: Former     Current packs/day: 0.00     Average packs/day: 1.5 packs/day for 12.0 years (18.0 ttl pk-yrs)     Types: Cigarettes     Start date:      Quit date:      Years since quittin.2     Passive exposure: Never    Smokeless tobacco: Never   Vaping Use    Vaping status: Never Used   Substance Use Topics    Alcohol use: Yes     Alcohol/week: 6.0 - 8.0 standard drinks of alcohol     Types: 6 - 8 Cans of beer per week     Comment: Current every day    Drug use: Yes     Types: Marijuana     Comment: USED TWO DAYS AGO         Review of Systems:  Review of Systems   Unable to perform ROS: Mental status change        Physical Exam:  /76 (BP Location: Right arm, Patient Position: Lying)   Pulse 87   Temp 97.8 °F (36.6 °C) (Oral)   Resp 16   Ht 172.7 cm (68\")   Wt 67.6 kg (149 lb 0.5 oz)   SpO2 98%   BMI 22.66 kg/m²     Physical Exam  Vitals and nursing note reviewed.   Constitutional:       General: He is not in acute distress.     Appearance: Normal appearance. He is not toxic-appearing.   HENT:      Head: Normocephalic and atraumatic.      Jaw: There is normal jaw occlusion.   Eyes:      General: Lids are normal.      Extraocular Movements: Extraocular movements intact.      Conjunctiva/sclera: Conjunctivae normal.      Pupils: Pupils are equal, round, and reactive to light.   Cardiovascular:      Rate and Rhythm: Normal rate and regular rhythm.      Pulses: Normal pulses.      Heart sounds: Normal heart sounds.   Pulmonary:      Effort: Pulmonary effort is normal. No respiratory distress.      Breath sounds: Normal breath sounds. No wheezing or rhonchi.   Abdominal:      General: Abdomen is flat.      Palpations: Abdomen is soft.      Tenderness: There is no abdominal tenderness. There is no guarding or rebound.   Musculoskeletal:         General: Normal range of motion.      Cervical back: Normal range of motion and neck supple.      Right " lower leg: No edema.      Left lower leg: No edema.   Skin:     General: Skin is warm and dry.   Neurological:      Mental Status: He is alert. He is confused.   Psychiatric:         Mood and Affect: Mood normal.                    Medical Decision Making:      Comorbidities that affect care:    Alcoholism, hypertension    External Notes reviewed:    None      The following orders were placed and all results were independently analyzed by me:  Orders Placed This Encounter   Procedures    XR Chest 1 View    CT Head Without Contrast    Olmitz Draw    Comprehensive Metabolic Panel    High Sensitivity Troponin T    Magnesium    Urinalysis With Microscopic If Indicated (No Culture) - Urine, Clean Catch    CBC Auto Differential    Urine Drug Screen - Urine, Clean Catch    Blood Gas, Arterial -    Fentanyl, Urine - Urine, Clean Catch    High Sensitivity Troponin T 1Hr    Ethanol    Diet: Cardiac; Healthy Heart (2-3 Na+); Fluid Consistency: Thin (IDDSI 0)    Undress & Gown    Continuous Pulse Oximetry    Vital Signs    Orthostatic Blood Pressure    Code Status and Medical Interventions: CPR (Attempt to Resuscitate); Full Support    Inpatient Hospitalist Consult    Oxygen Therapy- Nasal Cannula; Titrate 1-6 LPM Per SpO2; 90 - 95%    POC Glucose Once    POC Lactate    POC Electrolyte Panel    ECG 12 Lead Altered Mental Status    Insert Peripheral IV    Initiate Observation Status    Fall Precautions    CBC & Differential    Green Top (Gel)    Lavender Top    Gold Top - SST    Light Blue Top       Medications Given in the Emergency Department:  Medications   sodium chloride 0.9 % flush 10 mL (has no administration in time range)   sodium chloride 0.9 % bolus 1,000 mL (0 mL Intravenous Stopped 3/21/25 2130)   acetaminophen (TYLENOL) tablet 650 mg (650 mg Oral Given 3/21/25 2324)        ED Course:         Labs:    Lab Results (last 24 hours)       Procedure Component Value Units Date/Time    CBC & Differential [[id number]]   (Abnormal) Collected: 03/21/25 1828    Specimen: Blood from Arm, Right Updated: 03/21/25 1854    Narrative:      The following orders were created for panel order CBC & Differential.  Procedure                               Abnormality         Status                     ---------                               -----------         ------                     CBC Auto Differential[[id number]]        Abnormal            Final result                 Please view results for these tests on the individual orders.    Comprehensive Metabolic Panel [[id number]]  (Abnormal) Collected: 03/21/25 1828    Specimen: Blood from Arm, Right Updated: 03/21/25 1918     Glucose 163 mg/dL      BUN 15 mg/dL      Creatinine 1.38 mg/dL      Sodium 123 mmol/L      Potassium 4.2 mmol/L      Chloride 86 mmol/L      CO2 21.8 mmol/L      Calcium 9.9 mg/dL      Total Protein 7.5 g/dL      Albumin 4.7 g/dL      ALT (SGPT) 22 U/L      AST (SGOT) 29 U/L      Alkaline Phosphatase 63 U/L      Total Bilirubin 0.2 mg/dL      Globulin 2.8 gm/dL      A/G Ratio 1.7 g/dL      BUN/Creatinine Ratio 10.9     Anion Gap 15.2 mmol/L      eGFR 58.5 mL/min/1.73     Narrative:      GFR Categories in Chronic Kidney Disease (CKD)      GFR Category          GFR (mL/min/1.73)    Interpretation  G1                     90 or greater         Normal or high (1)  G2                      60-89                Mild decrease (1)  G3a                   45-59                Mild to moderate decrease  G3b                   30-44                Moderate to severe decrease  G4                    15-29                Severe decrease  G5                    14 or less           Kidney failure          (1)In the absence of evidence of kidney disease, neither GFR category G1 or G2 fulfill the criteria for CKD.    eGFR calculation 2021 CKD-EPI creatinine equation, which does not include race as a factor    High Sensitivity Troponin T [[id number]]  (Normal) Collected: 03/21/25 1828    Specimen:  Blood from Arm, Right Updated: 03/21/25 1918     HS Troponin T 17 ng/L     Narrative:      High Sensitive Troponin T Reference Range:  <14.0 ng/L- Negative Female for AMI  <22.0 ng/L- Negative Male for AMI  >=14 - Abnormal Female indicating possible myocardial injury.  >=22 - Abnormal Male indicating possible myocardial injury.   Clinicians would have to utilize clinical acumen, EKG, Troponin, and serial changes to determine if it is an Acute Myocardial Infarction or myocardial injury due to an underlying chronic condition.         Magnesium [[id number]]  (Normal) Collected: 03/21/25 1828    Specimen: Blood from Arm, Right Updated: 03/21/25 1918     Magnesium 2.1 mg/dL     CBC Auto Differential [[id number]]  (Abnormal) Collected: 03/21/25 1828    Specimen: Blood from Arm, Right Updated: 03/21/25 1854     WBC 11.54 10*3/mm3      RBC 3.67 10*6/mm3      Hemoglobin 11.3 g/dL      Hematocrit 32.6 %      MCV 88.8 fL      MCH 30.8 pg      MCHC 34.7 g/dL      RDW 12.8 %      RDW-SD 42.0 fl      MPV 8.5 fL      Platelets 371 10*3/mm3      Neutrophil % 82.5 %      Lymphocyte % 7.5 %      Monocyte % 8.3 %      Eosinophil % 0.8 %      Basophil % 0.5 %      Immature Grans % 0.4 %      Neutrophils, Absolute 9.51 10*3/mm3      Lymphocytes, Absolute 0.87 10*3/mm3      Monocytes, Absolute 0.96 10*3/mm3      Eosinophils, Absolute 0.09 10*3/mm3      Basophils, Absolute 0.06 10*3/mm3      Immature Grans, Absolute 0.05 10*3/mm3      nRBC 0.0 /100 WBC     Urinalysis With Microscopic If Indicated (No Culture) - Straight Cath [[id number]]  (Abnormal) Collected: 03/21/25 1840    Specimen: Urine from Straight Cath Updated: 03/21/25 1858     Color, UA Yellow     Appearance, UA Clear     pH, UA 6.0     Specific Gravity, UA 1.012     Glucose, UA Negative     Ketones, UA 15 mg/dL (1+)     Bilirubin, UA Negative     Blood, UA Negative     Protein, UA Negative     Leuk Esterase, UA Negative     Nitrite, UA Negative     Urobilinogen, UA 1.0 E.U./dL     Narrative:      Urine microscopic not indicated.    Urine Drug Screen - Straight Cath [[id number]]  (Abnormal) Collected: 03/21/25 1840    Specimen: Urine from Straight Cath Updated: 03/21/25 1928     THC, Screen, Urine Positive     Phencyclidine (PCP), Urine Negative     Cocaine Screen, Urine Negative     Methamphetamine, Ur Negative     Opiate Screen Negative     Amphetamine Screen, Urine Negative     Benzodiazepine Screen, Urine Negative     Tricyclic Antidepressants Screen Negative     Methadone Screen, Urine Negative     Barbiturates Screen, Urine Negative     Oxycodone Screen, Urine Negative     Buprenorphine, Screen, Urine Negative    Narrative:      Cutoff For Drugs Screened:    Amphetamines               500 ng/ml  Barbiturates               200 ng/ml  Benzodiazepines            150 ng/ml  Cocaine                    150 ng/ml  Methadone                  200 ng/ml  Opiates                    100 ng/ml  Phencyclidine               25 ng/ml  THC                         50 ng/ml  Methamphetamine            500 ng/ml  Tricyclic Antidepressants  300 ng/ml  Oxycodone                  100 ng/ml  Buprenorphine               10 ng/ml    The normal value for all drugs tested is negative. This report includes unconfirmed screening results, with the cutoff values listed, to be used for medical treatment purposes only.  Unconfirmed results must not be used for non-medical purposes such as employment or legal testing.  Clinical consideration should be applied to any drug of abuse test, particularly when unconfirmed results are used.      Fentanyl, Urine - Straight Cath [[id number]]  (Normal) Collected: 03/21/25 1840    Specimen: Urine from Straight Cath Updated: 03/21/25 1929     Fentanyl, Urine Negative    Narrative:      Negative Threshold:      Fentanyl 5 ng/mL     The normal value for the drug tested is negative. This report includes final unconfirmed screening results to be used for medical treatment purposes only.  Unconfirmed results must not be used for non-medical purposes such as employment or legal testing. Clinical consideration should be applied to any drug of abuse test, particularly when unconfirmed results are used.           POC Glucose Once [[id number]]  (Abnormal) Collected: 03/21/25 1900    Specimen: Blood Updated: 03/21/25 1906     Glucose 151 mg/dL      Comment: Serial Number: [id number]:  [id number]       Blood Gas, Arterial - [[id number]]  (Abnormal) Collected: 03/21/25 1900    Specimen: Arterial Blood Updated: 03/21/25 1913     Site Right Radial     Dagoberto's Test Positive     pH, Arterial 7.435 pH units      pCO2, Arterial 34.3 mm Hg      pO2, Arterial 78.8 mm Hg      HCO3, Arterial 23.0 mmol/L      Base Excess, Arterial -0.8 mmol/L      Comment: Serial Number: [id number]:  [id number]        O2 Saturation, Arterial 96.1 %      Hemoglobin, Blood Gas 11.2 g/dL      Hematocrit, Blood Gas 33.0 %      Barometric Pressure for Blood Gas 742.0000 mmHg      Modality RA     FIO2 21 %      Hemodilution No     PO2/FIO2 375    POC Lactate [[id number]]  (Normal) Collected: 03/21/25 1900    Specimen: Arterial Blood Updated: 03/21/25 1906     Lactate 0.6 mmol/L      Comment: Serial Number: [id number]:  [id number]       POC Electrolyte Panel [[id number]]  (Abnormal) Collected: 03/21/25 1900    Specimen: Arterial Blood Updated: 03/21/25 1906     Sodium 125 mmol/L      POC Potassium 4.1 mmol/L      Chloride 91 mmol/L      Ionized Calcium 1.10 mmol/L      Comment: Serial Number: [id number]:  [id number]       High Sensitivity Troponin T 1Hr [[id number]]  (Normal) Collected: 03/21/25 1942    Specimen: Blood Updated: 03/21/25 2013     HS Troponin T 19 ng/L      Troponin T Numeric Delta 2 ng/L     Narrative:      High Sensitive Troponin T Reference Range:  <14.0 ng/L- Negative Female for AMI  <22.0 ng/L- Negative Male for AMI  >=14 - Abnormal Female indicating possible myocardial injury.  >=22 - Abnormal Male indicating possible myocardial  injury.   Clinicians would have to utilize clinical acumen, EKG, Troponin, and serial changes to determine if it is an Acute Myocardial Infarction or myocardial injury due to an underlying chronic condition.         Ethanol [[id number]] Collected: 03/22/25 0110    Specimen: Blood Updated: 03/22/25 0126     Ethanol <10 mg/dL      Ethanol % <0.010 %     Narrative:      Ethanol (Plasma)  <10 Essentially Negative    Toxic Concentrations           mg/dL    Flushing, slowing of reflexes    Impaired visual activity         Depression of CNS              >100  Possible Coma                  >300                Imaging:    XR Chest 1 View  Result Date: 3/21/2025  XR CHEST 1 VW Date of Exam: 3/21/2025 7:14 PM EDT Indication: Weak/Dizzy/AMS triage protocol Comparison: Chest radiograph 5/29/2022. Chest CT 12/15/2022 Findings: Mediastinum: Cardiac silhouette appears unchanged and normal in size Lungs: Mildly increased prominence of central pulmonary vasculature/apparent perihilar interstitial opacities. No focal consolidation appreciated. Pleura: No pleural effusion or pneumothorax. Bones and soft tissues: No acute, displaced fracture seen.     Impression: Mildly increased prominence of central pulmonary vasculature/apparent perihilar interstitial opacities, which may be related to differences in technique, mild pulmonary edema, or viral/atypical infection. Electronically Signed: Isiah Lopez  3/21/2025 7:45 PM EDT  Workstation ID: [id number]    CT Head Without Contrast  Result Date: 3/21/2025  CT HEAD WO CONTRAST Date of Exam: 3/21/2025 7:14 PM EDT Indication: AMS. Comparison: Head CT 12/4/2012 Technique: Axial CT images were obtained of the head without contrast administration.  Reconstructed coronal and sagittal images were also obtained. Automated exposure control and iterative construction methods were used. Findings: No acute intracranial hemorrhage.Intact appearing gray-white differentiation.No extra-axial  fluid collection.No significant mass effect. No hydrocephalus. Mild generalized parenchymal volume loss. Scattered areas of periventricular and subcortical white matter hypoattenuation, nonspecific, perhaps from small vessel ischemic/hypertensive changes in a patient of this age.There are intracranial atherosclerotic calcifications. Paranasal sinus mucosal thickening with bubbly secretions in the right sphenoid sinus which can be correlated for symptoms of recent sinusitis. Mastoid air cells are essentially clear.Included globes and orbits appear unremarkable by CT. No acute or aggressive appearing bony or extracranial soft tissue process.     Impression: No acute intracranial findings. Electronically Signed: Isiah Lopez  3/21/2025 7:21 PM EDT  Workstation ID: [id number]        Differential Diagnosis and Discussion:    Altered Mental Status: Based on the patient's signs and symptoms, differential diagnosis includes but is not limited to meningitis, stroke, sepsis, subarachnoid hemorrhage, intracranial bleeding, encephalitis, and metabolic encephalopathy.    PROCEDURES:    Labs were collected in the emergency department and all labs were reviewed and interpreted by me.  X-ray were performed in the emergency department and all X-ray impressions were independently interpreted by me.  An EKG was performed and the EKG was interpreted by me.  CT scan was performed in the emergency department and the CT scan radiology impression was interpreted by me.    ECG 12 Lead Altered Mental Status   Preliminary Result   HEART RATE=94  bpm   RR Ovuhupyu=582  ms   [place] Mnfjqcqw=059  ms   P Horizontal Axis=-22  deg   P Front Axis=71  deg   QRSD Njubkhwf=189  ms   QT Rnbrlboi=002  ms   HOrL=523  ms   QRS Axis=13  deg   T Wave Axis=29  deg   - ABNORMAL ECG -   Sinus rhythm   ST elevation, consider anterolateral injury   Date and Time of Study:2025-03-21 18:44:30        My interpretation of EKG shows normal sinus rhythm, normal rate,  normal QT, no acute ischemia    Procedures    MDM  Number of Diagnoses or Management Options  Acute delirium  Hyponatremia  Diagnosis management comments: In summary this is a 59-year-old male patient who presents to the emergency department for evaluation of mental status change.  Patient is awake and alert however has difficulty answering some questions and stops midsentence sometimes.  His sentences do make sense.  CT brain and chest x-ray reviewed by me are unremarkable negative for acute pathology.  CBC independently reviewed and interpreted by me and shows no critical abnormalities.  CMP independently reviewed and interpreted by me and shows critical abnormalities of hyponatremia.  Urine drug screen positive for marijuana which patient does admit to.  Serum alcohol level independently reviewed interpreted by me is unremarkable.  Patient case has been discussed with the hospitalist team who will admit to the hospital for further evaluation and continuation of treatment.       Amount and/or Complexity of Data Reviewed  Clinical lab tests: reviewed  Tests in the radiology section of CPT®: reviewed  Tests in the medicine section of CPT®: reviewed                       Patient Care Considerations:    MRI: I considered ordering an MRI however this can be accomplished inpatient if deemed necessary      Consultants/Shared Management Plan:    Hospitalist: I have discussed the case with Dr. Beck who agrees to accept the patient for admission.    Social Determinants of Health:    Patient is unable to carry out activities of daily life. Escalation of care is necessary.       Disposition and Care Coordination:    Admit:   Through independent evaluation of the patient's history, physical, and imperical data, the patient meets criteria for inpatient admission to the hospital.        Final diagnoses:   Acute delirium   Hyponatremia        ED Disposition       ED Disposition   Decision to Admit    Condition   --    Comment    Level of Care: Remote Telemetry [26]   Diagnosis: Hyponatremia [198519]   Admitting Physician: EVAN CADENA [[id number]]                 This medical record created using voice recognition software.             Nitin Santoro MD  03/22/25 0135

## 2025-03-22 NOTE — ED NOTES
"Pt is able to move all limbs is able to answer questions at times, pt responds when spoken to, states he \"is sorry\" several times. Pt is able to answer questions is aware of where he is, date president etc. Sister has been at bedside most of this visit  "

## 2025-03-22 NOTE — H&P
[facility]   HISTORY AND PHYSICAL    Patient Name: Bentley Martínez  : 1965  MRN: [id number]  Primary Care Physician:  Rekha Quevedo APRN  Date of admission: 3/21/2025    Subjective   Subjective     Chief Complaint: Altered mental status, delusions    HPI:    Bentley Martínez is a 59 y.o. male with past medical history of hypertension, alcohol abuse, COPD, arthritis and GERD presented to the ED for evaluation of altered mental status with delusions/hallucinations.  When seen patient was difficult to arouse after receiving sedating meds.  Patient was brought in to the ED due to him having delusions and confusion where he would think that he kill the president and that he  and came back to life.  Of note patient drinks 6-8 beers daily per family.  In the ED patient was hypertensive on arrival with remaining vitals being within normal limits.  Drug screen was positive for THC with labs showing reduced renal function, hyperglycemia, mild leukocytosis, anemia, hypochloremia and hyponatremia.  Remaining labs were relatively unremarkable including a normal troponin, and 0 ethanol level.  CT head was negative for any acute findings and chest x-ray showed mildly increased prominence of the central pulmonary vasculature.  Patient was given 4 mg of Ativan due to severe agitation while on the floor.  When seen patient was lethargic and difficult to arouse where he will wake up and follow admitted back asleep.      Review of Systems   Lethargic, altered    Personal History     Past Medical History:   Diagnosis Date    Abdominal pain     Alcohol abuse 2021    Allergic rhinitis due to allergen 10/24/2018    Asthma     Black stool     C. difficile diarrhea     Essential hypertension 10/24/2018    GERD (gastroesophageal reflux disease) 10/24/2018    High cholesterol     History of urinary retention     post op    Hyperlipidemia 10/24/2018    Hypertension     Inguinal hernia     Laryngeal candidiasis      "Voice hoarseness        Past Surgical History:   Procedure Laterality Date    COLONOSCOPY N/A 10/30/2023    Procedure: COLONOSCOPY with POLYPECTOMY;  Surgeon: Orlando Leon MD;  Location: [facility] ENDOSCOPY;  Service: General;  Laterality: N/A;  COLON POLYP, HEMORRHOIDS    CYST REMOVAL      Back of neck    ENDOSCOPY N/A 10/30/2023    Procedure: ESOPHAGOGASTRODUODENOSCOPY WITH BIOPSIES;  Surgeon: Orlando Leon MD;  Location: [facility] ENDOSCOPY;  Service: General;  Laterality: N/A;  NORMAL    HERNIA REPAIR      \"30 years ago\"    INGUINAL HERNIA REPAIR Right 8/11/2021    Procedure: INGUINAL HERNIA REPAIR LAPAROSCOPIC WITH DAVINCI ROBOT;  Surgeon: Orlando Leon MD;  Location: [facility] MAIN OR;  Service: Robotics - DaVinci;  Laterality: Right;       Family History: family history includes Heart disease in his brother, father, and mother. Otherwise pertinent FHx was reviewed and not pertinent to current issue.    Social History:  reports that he quit smoking about 36 years ago. His smoking use included cigarettes. He started smoking about 48 years ago. He has a 18 pack-year smoking history. He has never been exposed to tobacco smoke. He has never used smokeless tobacco. He reports current alcohol use of about 6.0 - 8.0 standard drinks of alcohol per week. He reports current drug use. Drug: Marijuana.    Home Medications:  B1, albuterol sulfate HFA, aspirin, cetirizine, hydroCHLOROthiazide, lisinopril, lovastatin, mometasone-formoterol, montelukast, naproxen, pantoprazole, simethicone, tiotropium, and vitamin B-12      Allergies:  No Known Allergies    Objective   Objective     Vitals:   Temp:  [97.5 °F (36.4 °C)-98.2 °F (36.8 °C)] 97.5 °F (36.4 °C)  Heart Rate:  [] 99  Resp:  [14-24] 24  BP: (144-184)/(76-97) 184/96  Physical Exam    Constitutional: Lethargic, altered   Eyes: PERRLA, sclerae anicteric, no conjunctival injection   HENT: NCAT, mucous membranes moist   Neck: Supple, no thyromegaly, no " lymphadenopathy, trachea midline   Respiratory: Clear to auscultation bilaterally, nonlabored respirations    Cardiovascular: RRR, no murmurs, rubs, or gallops, palpable pedal pulses bilaterally   Gastrointestinal: Positive bowel sounds, soft, nondistended   Musculoskeletal: No bilateral ankle edema, no clubbing or cyanosis to extremities   Psychiatric: Delusions   Neurologic: Pupils reactive   Skin: No rashes     Result Review    Result Review:  I have personally reviewed the results from the time of this admission to 3/22/2025 04:42 EDT and agree with these findings:  [x]  Laboratory list / accordion  []  Microbiology  [x]  Radiology  [x]  EKG/Telemetry   []  Cardiology/Vascular   []  Pathology  []  Old records  []  Other:  Most notable findings include: Bentley Martínez is a 59 y.o. male with past medical history of hypertension, alcohol abuse, COPD, arthritis and GERD presented to the ED for evaluation of altered mental status with delusions/hallucinations.      Assessment & Plan   Assessment / Plan     Brief Patient Summary:  Bentley Martínez is a 59 y.o. male with past medical history of hypertension, alcohol abuse, COPD, arthritis and GERD presented to the ED for evaluation of altered mental status with delusions/hallucinations.     Active Hospital Problems:  Active Hospital Problems    Diagnosis     **Hyponatremia     Acute delirium     Alcohol abuse     Essential hypertension     GERD (gastroesophageal reflux disease)      Plan:     Altered mental status/delusions  -Admit to telemetry  -CT head negative for any acute findings  -Patient with daily alcohol use  -Drug screen positive for THC  -Possible Dts  -[facility] protocol  -Seizure precautions  -Will follow along    Hyponatremia  -Euvolemic  -Serum osmolality of 264  -Daily beer drinker  -UA reviewed  -Urine potassium, urine sodium pending  -NS given.  -Fluid restriction if warranted  -EKG reviewed  -Will follow-up labs  -Consult nephrology if  warranted    HTN  -Currently well controlled  -PRN BP meds  -Resume home meds when available  -Titrate if needed    COPD  -DuoNebs PRN        GI ppx  DVT ppx    VTE Prophylaxis:  Pharmacologic VTE prophylaxis orders are present.        CODE STATUS:    Code Status (Patient has no pulse and is not breathing): CPR (Attempt to Resuscitate)  Medical Interventions (Patient has pulse or is breathing): Full Support  Level Of Support Discussed With: Patient    Admission Status:  I believe this patient meets inpatient status.      Electronically signed by Carlos Beck MD, 03/22/25, 4:42 AM EDT.

## 2025-03-22 NOTE — PROGRESS NOTES
[facility]   Hospitalist Progress Note  Date: 3/22/2025  Patient Name: Bentley Martínez  : 1965  MRN: [id number]  Date of admission: 3/21/2025  Room/Bed: [facility]/      Subjective   Subjective     Chief Complaint:   Altered mental status, delusions    Summary:Bentley Martínez is a 59 y.o. male     Bentley Martínez is a 59 y.o. male with past medical history of hypertension, alcohol abuse, COPD, arthritis and GERD presented to the ED for evaluation of altered mental status with delusions/hallucinations.  When seen patient was difficult to arouse after receiving sedating meds.  Patient was brought in to the ED due to him having delusions and confusion where he would think that he kill the president and that he  and came back to life.  Of note patient drinks 6-8 beers daily per family.  In the ED patient was hypertensive on arrival with remaining vitals being within normal limits.  Drug screen was positive for THC with labs showing reduced renal function, hyperglycemia, mild leukocytosis, anemia, hypochloremia and hyponatremia.  Remaining labs were relatively unremarkable including a normal troponin, and 0 ethanol level.  CT head was negative for any acute findings and chest x-ray showed mildly increased prominence of the central pulmonary vasculature.  Patient was given 4 mg of Ativan due to severe agitation while on the floor.      Interval Followup:   This morning patient resting in bed, withdrawn on exam however answering questions.  Patient states he remembers the news to report whether he was on the TV or an article, understanding that the president was dead.  We discussed that the president was not dead, patient accepting of this.  Patient states he also felt like he was dead.  Patient states he remembers waking up, feeling like he was dead.  Shirt was over his face as if it was covering the face of the .  However when discussing with patient stating that he was in fact not dead.  Patient  understanding and agreeable to this.  Patient does not appear to have delusions that are fixed.    Objective   Objective     Vitals:   Temp:  [97.5 °F (36.4 °C)-98.2 °F (36.8 °C)] 97.8 °F (36.6 °C)  Heart Rate:  [] 84  Resp:  [14-24] 20  BP: (134-184)/(70-97) 134/70    Physical Exam               Constitutional: Resting in bed, withdrawn, answering questions appropriately.  Alert and oriented x 3              Eyes: PERRLA, sclerae anicteric, no conjunctival injection              HENT: NCAT, mucous membranes moist              Neck: Supple, no thyromegaly, no lymphadenopathy, trachea midline              Respiratory: Clear to auscultation bilaterally, nonlabored respirations               Cardiovascular: RRR, no murmurs, rubs, or gallops, palpable pedal pulses bilaterally              Gastrointestinal: Positive bowel sounds, soft, nondistended              Musculoskeletal: No bilateral ankle edema, no clubbing or cyanosis to extremities              Neurologic: Alert and oriented x 3.  Moving all 4 extremities to command, no pronator drift, cranial nerves grossly intact, no clonus    Result Review    Result Review:  I have personally reviewed these results:  [x]  Laboratory      Lab 03/22/25  0555 03/21/25  1900 03/21/25  1828   WBC 6.10  --  11.54*   HEMOGLOBIN 10.9*  --  11.3*   HEMATOCRIT 30.7*  --  32.6*   PLATELETS 359  --  371   NEUTROS ABS  --   --  9.51*   IMMATURE GRANS (ABS)  --   --  0.05   LYMPHS ABS  --   --  0.87   MONOS ABS  --   --  0.96*   EOS ABS  --   --  0.09   MCV 89.8  --  88.8   LACTATE  --  0.6  --          Lab 03/22/25  0555 03/21/25  1828   SODIUM 128* 123*   POTASSIUM 4.0 4.2   CHLORIDE 92* 86*   CO2 22.0 21.8*   ANION GAP 14.0 15.2*   BUN 13 15   CREATININE 1.10 1.38*   EGFR 76.9 58.5*   GLUCOSE 116* 163*   CALCIUM 9.2 9.9   MAGNESIUM  --  2.1         Lab 03/21/25  1828   TOTAL PROTEIN 7.5   ALBUMIN 4.7   GLOBULIN 2.8   ALT (SGPT) 22   AST (SGOT) 29   BILIRUBIN 0.2   ALK PHOS 63          Lab 03/21/25  1942 03/21/25  1828   HSTROP T 19 17                 Lab 03/21/25  1900   PH, ARTERIAL 7.435   PCO2, ARTERIAL 34.3*   PO2 ART 78.8*   O2 SATURATION ART 96.1   FIO2 21   HCO3 ART 23.0   BASE EXCESS ART -0.8     Brief Urine Lab Results  (Last result in the past 365 days)        Color   Clarity   Blood   Leuk Est   Nitrite   Protein   CREAT   Urine HCG        03/21/25 1840 Yellow   Clear   Negative   Negative   Negative   Negative                 [x]  Microbiology   Microbiology Results (last 10 days)       ** No results found for the last 240 hours. **          [x]  Radiology  XR Chest 1 View  Result Date: 3/21/2025  Impression: Mildly increased prominence of central pulmonary vasculature/apparent perihilar interstitial opacities, which may be related to differences in technique, mild pulmonary edema, or viral/atypical infection. Electronically Signed: Isiah Lopez  3/21/2025 7:45 PM EDT  Workstation ID: [id number]    CT Head Without Contrast  Result Date: 3/21/2025  Impression: No acute intracranial findings. Electronically Signed: Isiah Lopez  3/21/2025 7:21 PM EDT  Workstation ID: [id number]    []  EKG/Telemetry   []  Cardiology/Vascular   []  Pathology  []  Old records  []  Other:    Assessment & Plan   Assessment / Plan     Assessment:  Altered mental status, delusions  Hyponatremia  History of alcohol abuse, at risk for withdrawal  COPD  Arthritis  GERD  Hypertension    Plan:  Patient remains admitted to the hospital for further care and management  Continue to watch sodium as it slowly improves, history of hyponatremia on previous labs  Hold any further IV fluids at this time, monitor sodium every 6 hours  Hold patient's thiazide likely a contributor to his hyponatremia, combined with beer potomania  Patient is a daily consumer of alcohol, continue to monitor on CIWA protocol  On my examination patient does not appear further fixated on these delusions.  Patient states he remembers  seeing a news report that the president was killed.  However, understands and is agreeable with that the president is not dead.  Patient also states his sensation of feeling like he was dead was most related to a dream.  However will continue to monitor patient's possible fixed delusions.  If metabolic etiologies have been ruled out and patient continues with fixed delusions will likely need to discuss with psychiatry  Given patient's history of alcohol abuse continue supplementing thiamine and folate  Patient's family indicates possible tick exposure, ordering labs     Discussed with RN.    VTE Prophylaxis:  Pharmacologic VTE prophylaxis orders are present.        CODE STATUS:   Code Status (Patient has no pulse and is not breathing): CPR (Attempt to Resuscitate)  Medical Interventions (Patient has pulse or is breathing): Full Support  Level Of Support Discussed With: Patient      Electronically signed by Tommy Shi MD, 3/22/2025, 09:31 EDT.

## 2025-03-22 NOTE — PAYOR COMM NOTE
"Carri Martínez (59 y.o. Male)       Date of Birth   1965    Social Security Number       Address   [address] [place] [address]    Home Phone   [phone]    MRN   [id number]       [employer]   [facility]    Marital Status   Single                            Admission Date   3/21/2025    Admission Type   Emergency    Admitting Provider   Tommy Shi MD    Attending Provider   Tommy Shi MD    Department, Room/Bed   [address], 4002/1       Discharge Date       Discharge Disposition       Discharge Destination                                 Attending Provider: Tommy Shi MD    Allergies: No Known Allergies    Isolation: None   Infection: None   Code Status: CPR    Ht: 172.7 cm (68\")   Wt: 64.1 kg (141 lb 5 oz)    Admission Cmt: None   Principal Problem: Hyponatremia [E87.1]                   Active Insurance as of 3/21/2025       Primary Coverage       Payor Plan Insurance Group Employer/Plan Group    [facility] COMMUNITY PLAN [facility] COMMUNITY PLAN [facility]       Payor Plan Address Payor Plan Phone Number Payor Plan Fax Number Effective Dates    [address]   2025 - None Entered    [facility] [phone]         Subscriber Name Subscriber Birth Date Member ID       CARRI MARTÍNEZ 1965 [id number]                     Emergency Contacts        (Rel.) Home Phone Work Phone Mobile Phone    MARJORIE MARTÍNEZ (Brother) [phone] -- [phone]                 History & Physical        BeckCarlos cao MD at [phone]           [facility]   HISTORY AND PHYSICAL    Patient Name: Carri Martínez  : 1965  MRN: [id number]  Primary Care Physician:  Rekha Quevedo APRN  Date of admission: 3/21/2025    Subjective  Subjective     Chief Complaint: Altered mental status, delusions    HPI:    Carri Martínez is a 59 y.o. male with past medical history of hypertension, alcohol abuse, COPD, arthritis and GERD presented to the ED for evaluation of " altered mental status with delusions/hallucinations.  When seen patient was difficult to arouse after receiving sedating meds.  Patient was brought in to the ED due to him having delusions and confusion where he would think that he kill the president and that he  and came back to life.  Of note patient drinks 6-8 beers daily per family.  In the ED patient was hypertensive on arrival with remaining vitals being within normal limits.  Drug screen was positive for THC with labs showing reduced renal function, hyperglycemia, mild leukocytosis, anemia, hypochloremia and hyponatremia.  Remaining labs were relatively unremarkable including a normal troponin, and 0 ethanol level.  CT head was negative for any acute findings and chest x-ray showed mildly increased prominence of the central pulmonary vasculature.  Patient was given 4 mg of Ativan due to severe agitation while on the floor.  When seen patient was lethargic and difficult to arouse where he will wake up and follow admitted back asleep.      Review of Systems   Lethargic, altered    Personal History     Past Medical History:   Diagnosis Date    Abdominal pain     Alcohol abuse 2021    Allergic rhinitis due to allergen 10/24/2018    Asthma     Black stool     C. difficile diarrhea     Essential hypertension 10/24/2018    GERD (gastroesophageal reflux disease) 10/24/2018    High cholesterol     History of urinary retention     post op    Hyperlipidemia 10/24/2018    Hypertension     Inguinal hernia     Laryngeal candidiasis     Voice hoarseness        Past Surgical History:   Procedure Laterality Date    COLONOSCOPY N/A 10/30/2023    Procedure: COLONOSCOPY with POLYPECTOMY;  Surgeon: Orlando Leon MD;  Location: [facility] ENDOSCOPY;  Service: General;  Laterality: N/A;  COLON POLYP, HEMORRHOIDS    CYST REMOVAL      Back of neck    ENDOSCOPY N/A 10/30/2023    Procedure: ESOPHAGOGASTRODUODENOSCOPY WITH BIOPSIES;  Surgeon: Orlando Leon MD;  Location:  "[facility] ENDOSCOPY;  Service: General;  Laterality: N/A;  NORMAL    HERNIA REPAIR      \"30 years ago\"    INGUINAL HERNIA REPAIR Right 8/11/2021    Procedure: INGUINAL HERNIA REPAIR LAPAROSCOPIC WITH DAVINCI ROBOT;  Surgeon: Orlando Leon MD;  Location: [facility] MAIN OR;  Service: Robotics - DaVinci;  Laterality: Right;       Family History: family history includes Heart disease in his brother, father, and mother. Otherwise pertinent FHx was reviewed and not pertinent to current issue.    Social History:  reports that he quit smoking about 36 years ago. His smoking use included cigarettes. He started smoking about 48 years ago. He has a 18 pack-year smoking history. He has never been exposed to tobacco smoke. He has never used smokeless tobacco. He reports current alcohol use of about 6.0 - 8.0 standard drinks of alcohol per week. He reports current drug use. Drug: Marijuana.    Home Medications:  B1, albuterol sulfate HFA, aspirin, cetirizine, hydroCHLOROthiazide, lisinopril, lovastatin, mometasone-formoterol, montelukast, naproxen, pantoprazole, simethicone, tiotropium, and vitamin B-12      Allergies:  No Known Allergies    Objective  Objective     Vitals:   Temp:  [97.5 °F (36.4 °C)-98.2 °F (36.8 °C)] 97.5 °F (36.4 °C)  Heart Rate:  [] 99  Resp:  [14-24] 24  BP: (144-184)/(76-97) 184/96  Physical Exam    Constitutional: Lethargic, altered   Eyes: PERRLA, sclerae anicteric, no conjunctival injection   HENT: NCAT, mucous membranes moist   Neck: Supple, no thyromegaly, no lymphadenopathy, trachea midline   Respiratory: Clear to auscultation bilaterally, nonlabored respirations    Cardiovascular: RRR, no murmurs, rubs, or gallops, palpable pedal pulses bilaterally   Gastrointestinal: Positive bowel sounds, soft, nondistended   Musculoskeletal: No bilateral ankle edema, no clubbing or cyanosis to extremities   Psychiatric: Delusions   Neurologic: Pupils reactive   Skin: No rashes     Result Review   Result " Review:  I have personally reviewed the results from the time of this admission to 3/22/2025 04:42 EDT and agree with these findings:  [x]  Laboratory list / accordion  []  Microbiology  [x]  Radiology  [x]  EKG/Telemetry   []  Cardiology/Vascular   []  Pathology  []  Old records  []  Other:  Most notable findings include: Bentley Martínez is a 59 y.o. male with past medical history of hypertension, alcohol abuse, COPD, arthritis and GERD presented to the ED for evaluation of altered mental status with delusions/hallucinations.      Assessment & Plan  Assessment / Plan     Brief Patient Summary:  Bentley Martínez is a 59 y.o. male with past medical history of hypertension, alcohol abuse, COPD, arthritis and GERD presented to the ED for evaluation of altered mental status with delusions/hallucinations.     Active Hospital Problems:  Active Hospital Problems    Diagnosis     **Hyponatremia     Acute delirium     Alcohol abuse     Essential hypertension     GERD (gastroesophageal reflux disease)      Plan:     Altered mental status/delusions  -Admit to telemetry  -CT head negative for any acute findings  -Patient with daily alcohol use  -Drug screen positive for THC  -Possible Dts  -CIWA protocol  -Seizure precautions  -Will follow along    Hyponatremia  -Euvolemic  -Serum osmolality of 264  -Daily beer drinker  -UA reviewed  -Urine potassium, urine sodium pending  -NS given.  -Fluid restriction if warranted  -EKG reviewed  -Will follow-up labs  -Consult nephrology if warranted    HTN  -Currently well controlled  -PRN BP meds  -Resume home meds when available  -Titrate if needed    COPD  -DuoNebs PRN        GI ppx  DVT ppx    VTE Prophylaxis:  Pharmacologic VTE prophylaxis orders are present.        CODE STATUS:    Code Status (Patient has no pulse and is not breathing): CPR (Attempt to Resuscitate)  Medical Interventions (Patient has pulse or is breathing): Full Support  Level Of Support Discussed With:  "Patient    Admission Status:  I believe this patient meets inpatient status.      Electronically signed by Carlos Beck MD, 03/22/25, 4:42 AM EDT.    Electronically signed by Carlos Beck MD at 03/22/25 0458          Emergency Department Notes        Nitin Santoro MD at 03/22/25 0132          Time: 1:32 AM EDT  Date of encounter:  3/21/2025  Independent Historian/Clinical History and Information was obtained by:   Patient, Family, Nursing Staff, and EMS    History is limited by: Altered Mental Status    Chief Complaint: Mental status change      History of Present Illness:  Patient is a 59 y.o. year old male who presents to the emergency department for evaluation of altered mental status.  Patient arrives altered but is answering questions periodically.      Patient Care Team  Primary Care Provider: Rekha Quevedo APRN    Past Medical History:     No Known Allergies  Past Medical History:   Diagnosis Date    Abdominal pain     Alcohol abuse 01/28/2021    Allergic rhinitis due to allergen 10/24/2018    Asthma     Black stool     C. difficile diarrhea     Essential hypertension 10/24/2018    GERD (gastroesophageal reflux disease) 10/24/2018    High cholesterol     History of urinary retention     post op    Hyperlipidemia 10/24/2018    Hypertension     Inguinal hernia     Laryngeal candidiasis     Voice hoarseness      Past Surgical History:   Procedure Laterality Date    COLONOSCOPY N/A 10/30/2023    Procedure: COLONOSCOPY with POLYPECTOMY;  Surgeon: Orlando Leon MD;  Location: [facility] ENDOSCOPY;  Service: General;  Laterality: N/A;  COLON POLYP, HEMORRHOIDS    CYST REMOVAL      Back of neck    ENDOSCOPY N/A 10/30/2023    Procedure: ESOPHAGOGASTRODUODENOSCOPY WITH BIOPSIES;  Surgeon: Orlando Leon MD;  Location: [facility] ENDOSCOPY;  Service: General;  Laterality: N/A;  NORMAL    HERNIA REPAIR      \"30 years ago\"    INGUINAL HERNIA REPAIR Right 8/11/2021    Procedure: INGUINAL HERNIA REPAIR " LAPAROSCOPIC WITH DAVINCI ROBOT;  Surgeon: Orlando Leon MD;  Location: [facility] MAIN OR;  Service: Robotics - DaVinci;  Laterality: Right;     Family History   Problem Relation Age of Onset    Heart disease Mother     Heart disease Father     Heart disease Brother        Home Medications:  Prior to Admission medications    Medication Sig Start Date End Date Taking? Authorizing Provider   albuterol sulfate  (90 Base) MCG/ACT inhaler Inhale 2 puffs Every 4 (Four) Hours As Needed for Wheezing or Shortness of Air. 8/20/24   Emelina Bautista APRN   aspirin 81 MG EC tablet Take 1 tablet by mouth Daily. 1/30/23   RED Garcia MD   cetirizine (zyrTEC) 10 MG tablet Take 1 tablet by mouth Daily. 8/20/24   Emelina Bautista APRN   Dulera 200-5 MCG/ACT inhaler Inhale 2 puffs 2 (Two) Times a Day. 8/20/24   Emelina Bautista APRN   hydroCHLOROthiazide 25 MG tablet Take 1 tablet by mouth Daily. Indications: High Blood Pressure 10/7/24   Rekha Quevedo APRN   lisinopril (PRINIVIL,ZESTRIL) 40 MG tablet Take 1 tablet by mouth Daily. Indications: High Blood Pressure 10/7/24   Rekha Quevedo APRN   lovastatin (MEVACOR) 20 MG tablet Take 1 tablet by mouth every night at bedtime. Indications: High Amount of Fats in the Blood 10/7/24   Rekha Quevedo APRN   montelukast (SINGULAIR) 10 MG tablet Take 1 tablet by mouth every night at bedtime. 8/20/24   Emelina Bautista APRN   naproxen (Naprosyn) 500 MG tablet Take 1 tablet by mouth 2 (Two) Times a Day With Meals. Indications: hand pain 10/7/24   Rekha Quevedo APRN   pantoprazole (PROTONIX) 40 MG EC tablet Take 1 tablet by mouth Daily. Indications: Gastroesophageal Reflux Disease 10/7/24   Rekha Quevedo APRN   simethicone (Gas-X) 80 MG chewable tablet Chew 1 tablet Every 6 (Six) Hours As Needed for Flatulence. 10/7/24   Rekha Quevedo APRN   Spiriva HandiHaler 18 MCG per inhalation capsule Place 1 capsule into inhaler and inhale Daily. 8/20/24    "Emelina Bautista APRN   Thiamine Mononitrate (B1) 100 MG tablet Take 1 tablet by mouth Daily. 24   Provider, MD [place]   vitamin B-12 (CYANOCOBALAMIN) 1000 MCG tablet Take 1 tablet by mouth Daily. Indications: Inadequate Vitamin B12 10/7/24   Rekha Quevedo APRN        Social History:   Social History     Tobacco Use    Smoking status: Former     Current packs/day: 0.00     Average packs/day: 1.5 packs/day for 12.0 years (18.0 ttl pk-yrs)     Types: Cigarettes     Start date:      Quit date:      Years since quittin.2     Passive exposure: Never    Smokeless tobacco: Never   Vaping Use    Vaping status: Never Used   Substance Use Topics    Alcohol use: Yes     Alcohol/week: 6.0 - 8.0 standard drinks of alcohol     Types: 6 - 8 Cans of beer per week     Comment: Current every day    Drug use: Yes     Types: Marijuana     Comment: USED TWO DAYS AGO         Review of Systems:  Review of Systems   Unable to perform ROS: Mental status change        Physical Exam:  /76 (BP Location: Right arm, Patient Position: Lying)   Pulse 87   Temp 97.8 °F (36.6 °C) (Oral)   Resp 16   Ht 172.7 cm (68\")   Wt 67.6 kg (149 lb 0.5 oz)   SpO2 98%   BMI 22.66 kg/m²     Physical Exam  Vitals and nursing note reviewed.   Constitutional:       General: He is not in acute distress.     Appearance: Normal appearance. He is not toxic-appearing.   HENT:      Head: Normocephalic and atraumatic.      Jaw: There is normal jaw occlusion.   Eyes:      General: Lids are normal.      Extraocular Movements: Extraocular movements intact.      Conjunctiva/sclera: Conjunctivae normal.      Pupils: Pupils are equal, round, and reactive to light.   Cardiovascular:      Rate and Rhythm: Normal rate and regular rhythm.      Pulses: Normal pulses.      Heart sounds: Normal heart sounds.   Pulmonary:      Effort: Pulmonary effort is normal. No respiratory distress.      Breath sounds: Normal breath sounds. No wheezing or " rhonchi.   Abdominal:      General: Abdomen is flat.      Palpations: Abdomen is soft.      Tenderness: There is no abdominal tenderness. There is no guarding or rebound.   Musculoskeletal:         General: Normal range of motion.      Cervical back: Normal range of motion and neck supple.      Right lower leg: No edema.      Left lower leg: No edema.   Skin:     General: Skin is warm and dry.   Neurological:      Mental Status: He is alert. He is confused.   Psychiatric:         Mood and Affect: Mood normal.                    Medical Decision Making:      Comorbidities that affect care:    Alcoholism, hypertension    External Notes reviewed:    None      The following orders were placed and all results were independently analyzed by me:  Orders Placed This Encounter   Procedures    XR Chest 1 View    CT Head Without Contrast    [place] Draw    Comprehensive Metabolic Panel    High Sensitivity Troponin T    Magnesium    Urinalysis With Microscopic If Indicated (No Culture) - Urine, Clean Catch    CBC Auto Differential    Urine Drug Screen - Urine, Clean Catch    Blood Gas, Arterial -    Fentanyl, Urine - Urine, Clean Catch    High Sensitivity Troponin T 1Hr    Ethanol    Diet: Cardiac; Healthy Heart (2-3 Na+); Fluid Consistency: Thin (IDDSI 0)    Undress & Gown    Continuous Pulse Oximetry    Vital Signs    Orthostatic Blood Pressure    Code Status and Medical Interventions: CPR (Attempt to Resuscitate); Full Support    Inpatient Hospitalist Consult    Oxygen Therapy- Nasal Cannula; Titrate 1-6 LPM Per SpO2; 90 - 95%    POC Glucose Once    POC Lactate    POC Electrolyte Panel    ECG 12 Lead Altered Mental Status    Insert Peripheral IV    Initiate Observation Status    Fall Precautions    CBC & Differential    Green Top (Gel)    Lavender Top    Gold Top - SST    Light Blue Top       Medications Given in the Emergency Department:  Medications   sodium chloride 0.9 % flush 10 mL (has no administration in time range)    sodium chloride 0.9 % bolus 1,000 mL (0 mL Intravenous Stopped 3/21/25 2130)   acetaminophen (TYLENOL) tablet 650 mg (650 mg Oral Given 3/21/25 2324)        ED Course:         Labs:    Lab Results (last 24 hours)       Procedure Component Value Units Date/Time    CBC & Differential [[id number]]  (Abnormal) Collected: 03/21/25 1828    Specimen: Blood from Arm, Right Updated: 03/21/25 1854    Narrative:      The following orders were created for panel order CBC & Differential.  Procedure                               Abnormality         Status                     ---------                               -----------         ------                     CBC Auto Differential[[id number]]        Abnormal            Final result                 Please view results for these tests on the individual orders.    Comprehensive Metabolic Panel [[id number]]  (Abnormal) Collected: 03/21/25 1828    Specimen: Blood from Arm, Right Updated: 03/21/25 1918     Glucose 163 mg/dL      BUN 15 mg/dL      Creatinine 1.38 mg/dL      Sodium 123 mmol/L      Potassium 4.2 mmol/L      Chloride 86 mmol/L      CO2 21.8 mmol/L      Calcium 9.9 mg/dL      Total Protein 7.5 g/dL      Albumin 4.7 g/dL      ALT (SGPT) 22 U/L      AST (SGOT) 29 U/L      Alkaline Phosphatase 63 U/L      Total Bilirubin 0.2 mg/dL      Globulin 2.8 gm/dL      A/G Ratio 1.7 g/dL      BUN/Creatinine Ratio 10.9     Anion Gap 15.2 mmol/L      eGFR 58.5 mL/min/1.73     Narrative:      GFR Categories in Chronic Kidney Disease (CKD)      GFR Category          GFR (mL/min/1.73)    Interpretation  G1                     90 or greater         Normal or high (1)  G2                      60-89                Mild decrease (1)  G3a                   45-59                Mild to moderate decrease  G3b                   30-44                Moderate to severe decrease  G4                    15-29                Severe decrease  G5                    14 or less           Kidney  failure          (1)In the absence of evidence of kidney disease, neither GFR category G1 or G2 fulfill the criteria for CKD.    eGFR calculation 2021 CKD-EPI creatinine equation, which does not include race as a factor    High Sensitivity Troponin T [[id number]]  (Normal) Collected: 03/21/25 1828    Specimen: Blood from Arm, Right Updated: 03/21/25 1918     HS Troponin T 17 ng/L     Narrative:      High Sensitive Troponin T Reference Range:  <14.0 ng/L- Negative Female for AMI  <22.0 ng/L- Negative Male for AMI  >=14 - Abnormal Female indicating possible myocardial injury.  >=22 - Abnormal Male indicating possible myocardial injury.   Clinicians would have to utilize clinical acumen, EKG, Troponin, and serial changes to determine if it is an Acute Myocardial Infarction or myocardial injury due to an underlying chronic condition.         Magnesium [[id number]]  (Normal) Collected: 03/21/25 1828    Specimen: Blood from Arm, Right Updated: 03/21/25 1918     Magnesium 2.1 mg/dL     CBC Auto Differential [[id number]]  (Abnormal) Collected: 03/21/25 1828    Specimen: Blood from Arm, Right Updated: 03/21/25 1854     WBC 11.54 10*3/mm3      RBC 3.67 10*6/mm3      Hemoglobin 11.3 g/dL      Hematocrit 32.6 %      MCV 88.8 fL      MCH 30.8 pg      MCHC 34.7 g/dL      RDW 12.8 %      RDW-SD 42.0 fl      MPV 8.5 fL      Platelets 371 10*3/mm3      Neutrophil % 82.5 %      Lymphocyte % 7.5 %      Monocyte % 8.3 %      Eosinophil % 0.8 %      Basophil % 0.5 %      Immature Grans % 0.4 %      Neutrophils, Absolute 9.51 10*3/mm3      Lymphocytes, Absolute 0.87 10*3/mm3      Monocytes, Absolute 0.96 10*3/mm3      Eosinophils, Absolute 0.09 10*3/mm3      Basophils, Absolute 0.06 10*3/mm3      Immature Grans, Absolute 0.05 10*3/mm3      nRBC 0.0 /100 WBC     Urinalysis With Microscopic If Indicated (No Culture) - Straight Cath [[id number]]  (Abnormal) Collected: 03/21/25 1840    Specimen: Urine from Straight Cath Updated: 03/21/25 1858      Color, UA Yellow     Appearance, UA Clear     pH, UA 6.0     Specific Gravity, UA 1.012     Glucose, UA Negative     Ketones, UA 15 mg/dL (1+)     Bilirubin, UA Negative     Blood, UA Negative     Protein, UA Negative     Leuk Esterase, UA Negative     Nitrite, UA Negative     Urobilinogen, UA 1.0 E.U./dL    Narrative:      Urine microscopic not indicated.    Urine Drug Screen - Straight Cath [[id number]]  (Abnormal) Collected: 03/21/25 1840    Specimen: Urine from Straight Cath Updated: 03/21/25 1928     THC, Screen, Urine Positive     Phencyclidine (PCP), Urine Negative     Cocaine Screen, Urine Negative     Methamphetamine, Ur Negative     Opiate Screen Negative     Amphetamine Screen, Urine Negative     Benzodiazepine Screen, Urine Negative     Tricyclic Antidepressants Screen Negative     Methadone Screen, Urine Negative     Barbiturates Screen, Urine Negative     Oxycodone Screen, Urine Negative     Buprenorphine, Screen, Urine Negative    Narrative:      Cutoff For Drugs Screened:    Amphetamines               500 ng/ml  Barbiturates               200 ng/ml  Benzodiazepines            150 ng/ml  Cocaine                    150 ng/ml  Methadone                  200 ng/ml  Opiates                    100 ng/ml  Phencyclidine               25 ng/ml  THC                         50 ng/ml  Methamphetamine            500 ng/ml  Tricyclic Antidepressants  300 ng/ml  Oxycodone                  100 ng/ml  Buprenorphine               10 ng/ml    The normal value for all drugs tested is negative. This report includes unconfirmed screening results, with the cutoff values listed, to be used for medical treatment purposes only.  Unconfirmed results must not be used for non-medical purposes such as employment or legal testing.  Clinical consideration should be applied to any drug of abuse test, particularly when unconfirmed results are used.      Fentanyl, Urine - Straight Cath [[id number]]  (Normal) Collected: 03/21/25 1840     Specimen: Urine from Straight Cath Updated: 03/21/25 1929     Fentanyl, Urine Negative    Narrative:      Negative Threshold:      Fentanyl 5 ng/mL     The normal value for the drug tested is negative. This report includes final unconfirmed screening results to be used for medical treatment purposes only. Unconfirmed results must not be used for non-medical purposes such as employment or legal testing. Clinical consideration should be applied to any drug of abuse test, particularly when unconfirmed results are used.           POC Glucose Once [[id number]]  (Abnormal) Collected: 03/21/25 1900    Specimen: Blood Updated: 03/21/25 1906     Glucose 151 mg/dL      Comment: Serial Number: [id number]:  [id number]       Blood Gas, Arterial - [[id number]]  (Abnormal) Collected: 03/21/25 1900    Specimen: Arterial Blood Updated: 03/21/25 1913     Site Right Radial     Dagoberto's Test Positive     pH, Arterial 7.435 pH units      pCO2, Arterial 34.3 mm Hg      pO2, Arterial 78.8 mm Hg      HCO3, Arterial 23.0 mmol/L      Base Excess, Arterial -0.8 mmol/L      Comment: Serial Number: [id number]:  [id number]        O2 Saturation, Arterial 96.1 %      Hemoglobin, Blood Gas 11.2 g/dL      Hematocrit, Blood Gas 33.0 %      Barometric Pressure for Blood Gas 742.0000 mmHg      Modality RA     FIO2 21 %      Hemodilution No     PO2/FIO2 375    POC Lactate [[id number]]  (Normal) Collected: 03/21/25 1900    Specimen: Arterial Blood Updated: 03/21/25 1906     Lactate 0.6 mmol/L      Comment: Serial Number: [id number]:  [id number]       POC Electrolyte Panel [[id number]]  (Abnormal) Collected: 03/21/25 1900    Specimen: Arterial Blood Updated: 03/21/25 1906     Sodium 125 mmol/L      POC Potassium 4.1 mmol/L      Chloride 91 mmol/L      Ionized Calcium 1.10 mmol/L      Comment: Serial Number: [id number]:  [id number]       High Sensitivity Troponin T 1Hr [[id number]]  (Normal) Collected: 03/21/25 1942    Specimen: Blood Updated: 03/21/25 2013     HS  Troponin T 19 ng/L      Troponin T Numeric Delta 2 ng/L     Narrative:      High Sensitive Troponin T Reference Range:  <14.0 ng/L- Negative Female for AMI  <22.0 ng/L- Negative Male for AMI  >=14 - Abnormal Female indicating possible myocardial injury.  >=22 - Abnormal Male indicating possible myocardial injury.   Clinicians would have to utilize clinical acumen, EKG, Troponin, and serial changes to determine if it is an Acute Myocardial Infarction or myocardial injury due to an underlying chronic condition.         Ethanol [[id number]] Collected: 03/22/25 0110    Specimen: Blood Updated: 03/22/25 0126     Ethanol <10 mg/dL      Ethanol % <0.010 %     Narrative:      Ethanol (Plasma)  <10 Essentially Negative    Toxic Concentrations           mg/dL    Flushing, slowing of reflexes    Impaired visual activity         Depression of CNS              >100  Possible Coma                  >300                Imaging:    XR Chest 1 View  Result Date: 3/21/2025  XR CHEST 1 VW Date of Exam: 3/21/2025 7:14 PM EDT Indication: Weak/Dizzy/AMS triage protocol Comparison: Chest radiograph 5/29/2022. Chest CT 12/15/2022 Findings: Mediastinum: Cardiac silhouette appears unchanged and normal in size Lungs: Mildly increased prominence of central pulmonary vasculature/apparent perihilar interstitial opacities. No focal consolidation appreciated. Pleura: No pleural effusion or pneumothorax. Bones and soft tissues: No acute, displaced fracture seen.     Impression: Mildly increased prominence of central pulmonary vasculature/apparent perihilar interstitial opacities, which may be related to differences in technique, mild pulmonary edema, or viral/atypical infection. Electronically Signed: Isiah Lopez  3/21/2025 7:45 PM EDT  Workstation ID: [id number]    CT Head Without Contrast  Result Date: 3/21/2025  CT HEAD WO CONTRAST Date of Exam: 3/21/2025 7:14 PM EDT Indication: AMS. Comparison: Head CT 12/4/2012 Technique: Axial CT  images were obtained of the head without contrast administration.  Reconstructed coronal and sagittal images were also obtained. Automated exposure control and iterative construction methods were used. Findings: No acute intracranial hemorrhage.Intact appearing gray-white differentiation.No extra-axial fluid collection.No significant mass effect. No hydrocephalus. Mild generalized parenchymal volume loss. Scattered areas of periventricular and subcortical white matter hypoattenuation, nonspecific, perhaps from small vessel ischemic/hypertensive changes in a patient of this age.There are intracranial atherosclerotic calcifications. Paranasal sinus mucosal thickening with bubbly secretions in the right sphenoid sinus which can be correlated for symptoms of recent sinusitis. Mastoid air cells are essentially clear.Included globes and orbits appear unremarkable by CT. No acute or aggressive appearing bony or extracranial soft tissue process.     Impression: No acute intracranial findings. Electronically Signed: Isiah Lopez  3/21/2025 7:21 PM EDT  Workstation ID: [id number]        Differential Diagnosis and Discussion:    Altered Mental Status: Based on the patient's signs and symptoms, differential diagnosis includes but is not limited to meningitis, stroke, sepsis, subarachnoid hemorrhage, intracranial bleeding, encephalitis, and metabolic encephalopathy.    PROCEDURES:    Labs were collected in the emergency department and all labs were reviewed and interpreted by me.  X-ray were performed in the emergency department and all X-ray impressions were independently interpreted by me.  An EKG was performed and the EKG was interpreted by me.  CT scan was performed in the emergency department and the CT scan radiology impression was interpreted by me.    ECG 12 Lead Altered Mental Status   Preliminary Result   HEART RATE=94  bpm   RR Ifllhvwd=327  ms   NV Njbcfchj=778  ms   P Horizontal Axis=-22  deg   P Front Axis=71  deg    QRSD Kdjpjnxy=322  ms   QT Imbwpfow=659  ms   IKuC=383  ms   QRS Axis=13  deg   T Wave Axis=29  deg   - ABNORMAL ECG -   Sinus rhythm   ST elevation, consider anterolateral injury   Date and Time of Study:2025-03-21 18:44:30        My interpretation of EKG shows normal sinus rhythm, normal rate, normal QT, no acute ischemia    Procedures    MDM  Number of Diagnoses or Management Options  Acute delirium  Hyponatremia  Diagnosis management comments: In summary this is a 59-year-old male patient who presents to the emergency department for evaluation of mental status change.  Patient is awake and alert however has difficulty answering some questions and stops midsentence sometimes.  His sentences do make sense.  CT brain and chest x-ray reviewed by me are unremarkable negative for acute pathology.  CBC independently reviewed and interpreted by me and shows no critical abnormalities.  CMP independently reviewed and interpreted by me and shows critical abnormalities of hyponatremia.  Urine drug screen positive for marijuana which patient does admit to.  Serum alcohol level independently reviewed interpreted by me is unremarkable.  Patient case has been discussed with the hospitalist team who will admit to the hospital for further evaluation and continuation of treatment.       Amount and/or Complexity of Data Reviewed  Clinical lab tests: reviewed  Tests in the radiology section of CPT®: reviewed  Tests in the medicine section of CPT®: reviewed                       Patient Care Considerations:    MRI: I considered ordering an MRI however this can be accomplished inpatient if deemed necessary      Consultants/Shared Management Plan:    Hospitalist: I have discussed the case with Dr. Beck who agrees to accept the patient for admission.    Social Determinants of Health:    Patient is unable to carry out activities of daily life. Escalation of care is necessary.       Disposition and Care Coordination:    Admit:    "Through independent evaluation of the patient's history, physical, and imperical data, the patient meets criteria for inpatient admission to the hospital.        Final diagnoses:   Acute delirium   Hyponatremia        ED Disposition       ED Disposition   Decision to Admit    Condition   --    Comment   Level of Care: Remote Telemetry [26]   Diagnosis: Hyponatremia [364331]   Admitting Physician: EVAN CAEDNA [[id number]]                 This medical record created using voice recognition software.             Nitin Santoro MD  03/22/25 0135      Electronically signed by Nitin Santoro MD at 03/22/25 0135       Jo Ann De Jesus, RN at 03/21/25 2314          Pt states he has a headache at this time. Order received from  for 650mg tylenol. Pt does still have some confusion at times. Pt was able to ambulate around the nurses station.     Electronically signed by Jo Ann De Jesus, RN at 03/21/25 2316       Jo Ann De Jesus, RN at 03/21/25 2100          Pt is able to move all limbs is able to answer questions at times, pt responds when spoken to, states he \"is sorry\" several times. Pt is able to answer questions is aware of where he is, date president etc. Sister has been at bedside most of this visit    Electronically signed by Jo Ann De Jesus, RN at 03/21/25 2314       Facility-Administered Medications as of 3/22/2025   Medication Dose Route Frequency Provider Last Rate Last Admin    [COMPLETED] acetaminophen (TYLENOL) tablet 650 mg  650 mg Oral Once Nitin Santoro MD   650 mg at 03/21/25 2324    sennosides-docusate (PERICOLACE) 8.6-50 MG per tablet 2 tablet  2 tablet Oral BID PRN Evan Cadena MD        And    polyethylene glycol (MIRALAX) packet 17 g  17 g Oral Daily PRN Evan Cadena MD        And    bisacodyl (DULCOLAX) EC tablet 5 mg  5 mg Oral Daily PRN Evan Cadena MD        And    bisacodyl (DULCOLAX) suppository 10 mg  10 mg Rectal Daily PRN Evan Cadena MD        enoxaparin " sodium (LOVENOX) syringe 40 mg  40 mg Subcutaneous Daily Carlos Beck MD   40 mg at 03/22/25 0847    folic acid (FOLVITE) tablet 1 mg  1 mg Oral Daily Worth, PA   1 mg at 03/22/25 0847    ipratropium-albuterol (DUO-NEB) nebulizer solution 3 mL  3 mL Nebulization Q4H PRN Carlos Beck MD        labetalol (NORMODYNE,TRANDATE) injection 10 mg  10 mg Intravenous Q4H PRN Carlos Beck MD        LORazepam (ATIVAN) tablet 1 mg  1 mg Oral Q1H PRN Worth, PA        Or    LORazepam (ATIVAN) injection 1 mg  1 mg Intravenous Q1H PRN Worth, PA        Or    LORazepam (ATIVAN) tablet 2 mg  2 mg Oral Q1H PRN Worth, PA        Or    LORazepam (ATIVAN) injection 2 mg  2 mg Intravenous Q1H PRN Worth, PA        Or    LORazepam (ATIVAN) injection 2 mg  2 mg Intravenous Q15 Min PRN Worth, PA   2 mg at 03/22/25 0304    Or    LORazepam (ATIVAN) injection 2 mg  2 mg Intramuscular Q15 Min PRN Worth, PA        ondansetron (ZOFRAN) injection 4 mg  4 mg Intravenous Q6H PRN Carlos Beck MD        pantoprazole (PROTONIX) injection 40 mg  40 mg Intravenous Q AM Carlos Beck MD   40 mg at 03/22/25 0305    [COMPLETED] sodium chloride 0.9 % bolus 1,000 mL  1,000 mL Intravenous Once Nitin Santoro MD   Stopped at 03/21/25 2130    sodium chloride 0.9 % flush 10 mL  10 mL Intravenous PRN Nitin Santoro MD        sodium chloride 0.9 % flush 10 mL  10 mL Intravenous Q12H Carlos Beck MD   10 mL at 03/22/25 0847    sodium chloride 0.9 % flush 10 mL  10 mL Intravenous PRN Carlos Beck MD        sodium chloride 0.9 % infusion 40 mL  40 mL Intravenous PRN Carlos Beck MD        thiamine (B-1) injection 200 mg  200 mg Intravenous Q8H Worth, PA   200 mg at 03/22/25 0538    Followed by    [START ON 3/27/2025] thiamine (VITAMIN B-1) tablet 100 mg  100 mg Oral Daily Seda Rodriguez PA         Orders (active)        Start     Ordered    03/27/25 0900   "thiamine (VITAMIN B-1) tablet 100 mg  Daily        Placed in \"Followed by\" Linked Group    03/22/25 0238    03/23/25 0600  Vitamin B12  Morning Draw         03/22/25 0747    03/23/25 0600  CBC (No Diff)  Daily       03/22/25 1342    03/23/25 0600  Magnesium  Morning Draw         03/22/25 1342    03/23/25 0600  Comprehensive Metabolic Panel  Morning Draw         03/22/25 1342    03/22/25 1200  Basic Metabolic Panel  Every 6 Hours       03/22/25 0748    03/22/25 1124  PT Consult: Eval & Treat Functional Mobility Below Baseline  Once         03/22/25 1123    03/22/25 1123  Ehrlichia Profile DNA PCR  Once         03/22/25 1123    03/22/25 1123  Rickettsia Species DNA, Real-Time PCR  Once         03/22/25 1123    03/22/25 1123  Lyme Disease Total Antibody With Reflex to Immunoassay  Once         03/22/25 1123    03/22/25 0900  sodium chloride 0.9 % flush 10 mL  Every 12 Hours Scheduled         03/22/25 0235    03/22/25 0900  enoxaparin sodium (LOVENOX) syringe 40 mg  Daily         03/22/25 0235    03/22/25 0900  folic acid (FOLVITE) tablet 1 mg  Daily         03/22/25 0238    03/22/25 0800  Oral Care  2 Times Daily       03/22/25 0235    03/22/25 0747  Folate  Once         03/22/25 0747    03/22/25 0600  thiamine (B-1) injection 200 mg  Every 8 Hours Scheduled        Placed in \"Followed by\" Linked Group    03/22/25 0238    03/22/25 0447  labetalol (NORMODYNE,TRANDATE) injection 10 mg  Every 4 Hours PRN         03/22/25 0447    03/22/25 0400  Vital Signs  Every 4 Hours       03/22/25 0235    03/22/25 0330  pantoprazole (PROTONIX) injection 40 mg  Every Early Morning         03/22/25 0235    03/22/25 0239  Initiate Alcohol Withdrawal Protocol  Once         03/22/25 0238    03/22/25 0239  Vital Signs  Per Hospital Policy/Protocol         03/22/25 0238    03/22/25 0239  Continuous Pulse Oximetry  Continuous         03/22/25 0238    03/22/25 0239  Clinical [place] Withdrawal Assessment (CIWA-Ar)  Per Hospital " "Policy/Protocol         03/22/25 0238 03/22/25 0239  If CIWA-Ar Score Less Than 8 For 3 Consecutive Assessments, Monitor Every 4 Hours & Discontinue Assessment When CIWA-Ar Less Than 8 for 24 Hours  Per Hospital Policy/Protocol        Comments: Contact Provider to Restart Protocol if Any Symptoms Reappear    03/22/25 0238 03/22/25 0239  Seizure Precautions  Continuous         03/22/25 0238 03/22/25 0239  Notify Provider - Withdrawal  Continuous        Comments: Open Order Report to View Parameters Requiring Provider Notification    03/22/25 0238 03/22/25 0239  Notify Provider of Abnormal Lab Results  Continuous        Comments: Open Order Report to View Parameters Requiring Provider Notification    03/22/25 0238 03/22/25 0239  Notify Provider - Vitals  Continuous        Comments: Open Order Report to View Parameters Requiring Provider Notification    03/22/25 0238 03/22/25 0239  Safety Precautions  Continuous         03/22/25 0238 03/22/25 0238  LORazepam (ATIVAN) tablet 1 mg  Every 1 Hour PRN        Placed in \"Or\" Linked Group    03/22/25 0238    03/22/25 0238  LORazepam (ATIVAN) injection 1 mg  Every 1 Hour PRN        Placed in \"Or\" Linked Group    03/22/25 0238    03/22/25 0238  LORazepam (ATIVAN) tablet 2 mg  Every 1 Hour PRN        Placed in \"Or\" Linked Group    03/22/25 0238    03/22/25 0238  LORazepam (ATIVAN) injection 2 mg  Every 1 Hour PRN        Placed in \"Or\" Linked Group    03/22/25 0238    03/22/25 0238  LORazepam (ATIVAN) injection 2 mg  Every 15 Minutes PRN        Placed in \"Or\" Linked Group    03/22/25 0238    03/22/25 0238  LORazepam (ATIVAN) injection 2 mg  Every 15 Minutes PRN        Placed in \"Or\" Linked Group    03/22/25 0238    03/22/25 0236  Intake & Output  Every Shift       03/22/25 0235 03/22/25 0236  Weigh Patient  Once         03/22/25 0235 03/22/25 0236  Saline Lock & Maintain IV Access  Continuous         03/22/25 0235 03/22/25 0236  Activity - Ad Ivory  " "Until Discontinued         03/22/25 0235    03/22/25 0235  ipratropium-albuterol (DUO-NEB) nebulizer solution 3 mL  Every 4 Hours PRN         03/22/25 0235    03/22/25 0235  sodium chloride 0.9 % flush 10 mL  As Needed         03/22/25 0235    03/22/25 0235  sodium chloride 0.9 % infusion 40 mL  As Needed         03/22/25 0235    03/22/25 0235  sennosides-docusate (PERICOLACE) 8.6-50 MG per tablet 2 tablet  2 Times Daily PRN        Placed in \"And\" Linked Group    03/22/25 0235    03/22/25 0235  polyethylene glycol (MIRALAX) packet 17 g  Daily PRN        Placed in \"And\" Linked Group    03/22/25 0235    03/22/25 0235  bisacodyl (DULCOLAX) EC tablet 5 mg  Daily PRN        Placed in \"And\" Linked Group    03/22/25 0235    03/22/25 0235  bisacodyl (DULCOLAX) suppository 10 mg  Daily PRN        Placed in \"And\" Linked Group    03/22/25 0235    03/22/25 0235  ondansetron (ZOFRAN) injection 4 mg  Every 6 Hours PRN         03/22/25 0235    03/22/25 0035  Code Status and Medical Interventions: CPR (Attempt to Resuscitate); Full Support  Continuous         03/22/25 0036    03/22/25 0032  Diet: Cardiac; Healthy Heart (2-3 Na+); Fluid Consistency: Thin (IDDSI 0)  Diet Effective Now         03/22/25 0031    03/22/25 0007  Inpatient Hospitalist Consult  Once        Specialty:  Hospitalist  Provider:  Carlos Beck MD    03/22/25 0006    03/21/25 1822  Cardiac Monitoring  Continuous        Comments: Follow Standing Orders As Outlined in Process Instructions (Open Order Report to View Full Instructions)    03/21/25 1822 03/21/25 1822  Fall Precautions  Continuous         03/21/25 1822    03/21/25 1822  Insert Peripheral IV  Once         03/21/25 1822 03/21/25 1821  sodium chloride 0.9 % flush 10 mL  As Needed         03/21/25 1822    Unscheduled  Oxygen Therapy- Nasal Cannula; Titrate 1-6 LPM Per SpO2; 90 - 95%  Continuous PRN       03/21/25 5812    Unscheduled  Obtain Pre & Post Sedation Scores With Every Sedative Dose - " Hold For POSS Greater Than 2 or RASS of -3 or Less  As Needed       25 0238                     Physician Progress Notes (last 24 hours)        Tommy Shi MD at 25 0931           [facility] Hospitalist Progress Note  Date: 3/22/2025  Patient Name: Bentley Martínez  : 1965  MRN: [id number]  Date of admission: 3/21/2025  Room/Bed: [facility]      Subjective   Subjective     Chief Complaint:   Altered mental status, delusions    Summary:Bentley Martínez is a 59 y.o. male     Bentley Martínez is a 59 y.o. male with past medical history of hypertension, alcohol abuse, COPD, arthritis and GERD presented to the ED for evaluation of altered mental status with delusions/hallucinations.  When seen patient was difficult to arouse after receiving sedating meds.  Patient was brought in to the ED due to him having delusions and confusion where he would think that he kill the president and that he  and came back to life.  Of note patient drinks 6-8 beers daily per family.  In the ED patient was hypertensive on arrival with remaining vitals being within normal limits.  Drug screen was positive for THC with labs showing reduced renal function, hyperglycemia, mild leukocytosis, anemia, hypochloremia and hyponatremia.  Remaining labs were relatively unremarkable including a normal troponin, and 0 ethanol level.  CT head was negative for any acute findings and chest x-ray showed mildly increased prominence of the central pulmonary vasculature.  Patient was given 4 mg of Ativan due to severe agitation while on the floor.      Interval Followup:   This morning patient resting in bed, withdrawn on exam however answering questions.  Patient states he remembers the news to report whether he was on the TV or an article, understanding that the president was dead.  We discussed that the president was not dead, patient accepting of this.  Patient states he also felt like he was dead.  Patient states he remembers waking up,  feeling like he was dead.  Shirt was over his face as if it was covering the face of the .  However when discussing with patient stating that he was in fact not dead.  Patient understanding and agreeable to this.  Patient does not appear to have delusions that are fixed.    Objective   Objective     Vitals:   Temp:  [97.5 °F (36.4 °C)-98.2 °F (36.8 °C)] 97.8 °F (36.6 °C)  Heart Rate:  [] 84  Resp:  [14-24] 20  BP: (134-184)/(70-97) 134/70    Physical Exam               Constitutional: Resting in bed, withdrawn, answering questions appropriately.  Alert and oriented x 3              Eyes: PERRLA, sclerae anicteric, no conjunctival injection              HENT: NCAT, mucous membranes moist              Neck: Supple, no thyromegaly, no lymphadenopathy, trachea midline              Respiratory: Clear to auscultation bilaterally, nonlabored respirations               Cardiovascular: RRR, no murmurs, rubs, or gallops, palpable pedal pulses bilaterally              Gastrointestinal: Positive bowel sounds, soft, nondistended              Musculoskeletal: No bilateral ankle edema, no clubbing or cyanosis to extremities              Neurologic: Alert and oriented x 3.  Moving all 4 extremities to command, no pronator drift, cranial nerves grossly intact, no clonus    Result Review    Result Review:  I have personally reviewed these results:  [x]  Laboratory      Lab 25  0555 25  1900 25  1828   WBC 6.10  --  11.54*   HEMOGLOBIN 10.9*  --  11.3*   HEMATOCRIT 30.7*  --  32.6*   PLATELETS 359  --  371   NEUTROS ABS  --   --  9.51*   IMMATURE GRANS (ABS)  --   --  0.05   LYMPHS ABS  --   --  0.87   MONOS ABS  --   --  0.96*   EOS ABS  --   --  0.09   MCV 89.8  --  88.8   LACTATE  --  0.6  --          Lab 25  0555 25  1828   SODIUM 128* 123*   POTASSIUM 4.0 4.2   CHLORIDE 92* 86*   CO2 22.0 21.8*   ANION GAP 14.0 15.2*   BUN 13 15   CREATININE 1.10 1.38*   EGFR 76.9 58.5*   GLUCOSE 116*  163*   CALCIUM 9.2 9.9   MAGNESIUM  --  2.1         Lab 03/21/25  1828   TOTAL PROTEIN 7.5   ALBUMIN 4.7   GLOBULIN 2.8   ALT (SGPT) 22   AST (SGOT) 29   BILIRUBIN 0.2   ALK PHOS 63         Lab 03/21/25  1942 03/21/25  1828   HSTROP T 19 17                 Lab 03/21/25  1900   PH, ARTERIAL 7.435   PCO2, ARTERIAL 34.3*   PO2 ART 78.8*   O2 SATURATION ART 96.1   FIO2 21   HCO3 ART 23.0   BASE EXCESS ART -0.8     Brief Urine Lab Results  (Last result in the past 365 days)        Color   Clarity   Blood   Leuk Est   Nitrite   Protein   CREAT   Urine HCG        03/21/25 1840 Yellow   Clear   Negative   Negative   Negative   Negative                 [x]  Microbiology   Microbiology Results (last 10 days)       ** No results found for the last 240 hours. **          [x]  Radiology  XR Chest 1 View  Result Date: 3/21/2025  Impression: Mildly increased prominence of central pulmonary vasculature/apparent perihilar interstitial opacities, which may be related to differences in technique, mild pulmonary edema, or viral/atypical infection. Electronically Signed: Isiah Lopez  3/21/2025 7:45 PM EDT  Workstation ID: [id number]    CT Head Without Contrast  Result Date: 3/21/2025  Impression: No acute intracranial findings. Electronically Signed: Isiah Lopez  3/21/2025 7:21 PM EDT  Workstation ID: [id number]    []  EKG/Telemetry   []  Cardiology/Vascular   []  Pathology  []  Old records  []  Other:    Assessment & Plan   Assessment / Plan     Assessment:  Altered mental status, delusions  Hyponatremia  History of alcohol abuse, at risk for withdrawal  COPD  Arthritis  GERD  Hypertension    Plan:  Patient remains admitted to the hospital for further care and management  Continue to watch sodium as it slowly improves, history of hyponatremia on previous labs  Hold any further IV fluids at this time, monitor sodium every 6 hours  Hold patient's thiazide likely a contributor to his hyponatremia, combined with beer potomania  Patient  is a daily consumer of alcohol, continue to monitor on CIWA protocol  On my examination patient does not appear further fixated on these delusions.  Patient states he remembers seeing a news report that the president was killed.  However, understands and is agreeable with that the president is not dead.  Patient also states his sensation of feeling like he was dead was most related to a dream.  However will continue to monitor patient's possible fixed delusions.  If metabolic etiologies have been ruled out and patient continues with fixed delusions will likely need to discuss with psychiatry  Given patient's history of alcohol abuse continue supplementing thiamine and folate  Patient's family indicates possible tick exposure, ordering labs     Discussed with RN.    VTE Prophylaxis:  Pharmacologic VTE prophylaxis orders are present.        CODE STATUS:   Code Status (Patient has no pulse and is not breathing): CPR (Attempt to Resuscitate)  Medical Interventions (Patient has pulse or is breathing): Full Support  Level Of Support Discussed With: Patient      Electronically signed by Tommy Shi MD, 3/22/2025, 09:31 EDT.                        Electronically signed by Tommy Shi MD at 03/22/25 3667

## 2025-03-22 NOTE — PLAN OF CARE
Goal Outcome Evaluation:  Plan of Care Reviewed With: patient        Progress: improving  Outcome Evaluation: alert and oriented to person, place, and time. mentation slowly improved throughout shift. CIWA protocol continued and is now every 4 hours. pt up with x 1 assistance to bathroom and using urinal.

## 2025-03-22 NOTE — PLAN OF CARE
"Goal Outcome Evaluation:  Plan of Care Reviewed With: patient        Progress: no change  Outcome Evaluation: Pt was new admit this shift. Upon arrival, patient very excitable, apologetic, extreme anxiety, and panic. Stating \"I'm not crazy.\" Pt able to answer orientation questions besides situation. Pt states that he (the patient), \"knows what happened. I  at home of a heart attack and was brought to [facility]. But I'm alive again and able to tell the tale. I'm not crazy. I'm going to wake up from this dream and be back at home... you watch!\" Also stating, \"everything is moving fast\" constantly saying sister, Jia's, name, obsessivly putting pt's shirt over his face stating \"I'm not crazy\" Pt also states that \"Thony Lovell is dead and I think I killed him, but I know I didn't because I'm a good person.\" Multiple times pt stated \"Thony Lovell is dead.\" Sister states pt drinks 6-8 beers daily, and has never acted like this. States pt consumed 2 beers that she is aware of prior to going to ER. NICOLASA Terrazas contacted and [facility] protocol initiated. Per protocol, pt received total of 4mg ativan this shift. Seizure precautions initiated, continuous fluids, continuous pulse ox. This morning pt lethargic, but able to wake up and use urinal. Mentation improved with no hallucinations, but still believes he is in hospital for heart issues. BP elevated on arrival, but normalized after patient was able to calm down. HR tachy at time. Call light in reach, bed alert in use, near nurses station. No further issues/needs at this time.                             "

## 2025-03-22 NOTE — ED NOTES
Pt states he has a headache at this time. Order received from  for 650mg tylenol. Pt does still have some confusion at times. Pt was able to ambulate around the nurses station.

## 2025-03-23 ENCOUNTER — READMISSION MANAGEMENT (OUTPATIENT)
Dept: CALL CENTER | Facility: HOSPITAL | Age: 60
End: 2025-03-23
Payer: MEDICAID

## 2025-03-23 VITALS
DIASTOLIC BLOOD PRESSURE: 83 MMHG | OXYGEN SATURATION: 96 % | WEIGHT: 141.31 LBS | HEART RATE: 72 BPM | BODY MASS INDEX: 21.42 KG/M2 | HEIGHT: 68 IN | TEMPERATURE: 97.9 F | SYSTOLIC BLOOD PRESSURE: 121 MMHG | RESPIRATION RATE: 18 BRPM

## 2025-03-23 LAB
ALBUMIN SERPL-MCNC: 4.1 G/DL (ref 3.5–5.2)
ALBUMIN/GLOB SERPL: 1.6 G/DL
ALP SERPL-CCNC: 51 U/L (ref 39–117)
ALT SERPL W P-5'-P-CCNC: 16 U/L (ref 1–41)
ANION GAP SERPL CALCULATED.3IONS-SCNC: 13.1 MMOL/L (ref 5–15)
ANION GAP SERPL CALCULATED.3IONS-SCNC: 13.1 MMOL/L (ref 5–15)
ANION GAP SERPL CALCULATED.3IONS-SCNC: 15.2 MMOL/L (ref 5–15)
ANION GAP SERPL CALCULATED.3IONS-SCNC: 9.6 MMOL/L (ref 5–15)
AST SERPL-CCNC: 21 U/L (ref 1–40)
BILIRUB SERPL-MCNC: 0.3 MG/DL (ref 0–1.2)
BUN SERPL-MCNC: 13 MG/DL (ref 8–23)
BUN SERPL-MCNC: 13 MG/DL (ref 8–23)
BUN SERPL-MCNC: 14 MG/DL (ref 8–23)
BUN SERPL-MCNC: 16 MG/DL (ref 8–23)
BUN/CREAT SERPL: 12.3 (ref 7–25)
BUN/CREAT SERPL: 14.5 (ref 7–25)
CALCIUM SPEC-SCNC: 9 MG/DL (ref 8.6–10.5)
CALCIUM SPEC-SCNC: 9 MG/DL (ref 8.6–10.5)
CALCIUM SPEC-SCNC: 9.1 MG/DL (ref 8.6–10.5)
CALCIUM SPEC-SCNC: 9.2 MG/DL (ref 8.6–10.5)
CHLORIDE SERPL-SCNC: 96 MMOL/L (ref 98–107)
CHLORIDE SERPL-SCNC: 97 MMOL/L (ref 98–107)
CO2 SERPL-SCNC: 19.8 MMOL/L (ref 22–29)
CO2 SERPL-SCNC: 21.9 MMOL/L (ref 22–29)
CO2 SERPL-SCNC: 21.9 MMOL/L (ref 22–29)
CO2 SERPL-SCNC: 23.4 MMOL/L (ref 22–29)
CREAT SERPL-MCNC: 1.06 MG/DL (ref 0.76–1.27)
CREAT SERPL-MCNC: 1.06 MG/DL (ref 0.76–1.27)
CREAT SERPL-MCNC: 1.1 MG/DL (ref 0.76–1.27)
CREAT SERPL-MCNC: 1.14 MG/DL (ref 0.76–1.27)
DEPRECATED RDW RBC AUTO: 44.4 FL (ref 37–54)
EGFRCR SERPLBLD CKD-EPI 2021: 73.6 ML/MIN/1.73
EGFRCR SERPLBLD CKD-EPI 2021: 76.9 ML/MIN/1.73
EGFRCR SERPLBLD CKD-EPI 2021: 80.3 ML/MIN/1.73
EGFRCR SERPLBLD CKD-EPI 2021: 80.3 ML/MIN/1.73
ERYTHROCYTE [DISTWIDTH] IN BLOOD BY AUTOMATED COUNT: 13 % (ref 12.3–15.4)
GLOBULIN UR ELPH-MCNC: 2.5 GM/DL
GLUCOSE SERPL-MCNC: 100 MG/DL (ref 65–99)
GLUCOSE SERPL-MCNC: 104 MG/DL (ref 65–99)
GLUCOSE SERPL-MCNC: 93 MG/DL (ref 65–99)
GLUCOSE SERPL-MCNC: 93 MG/DL (ref 65–99)
HCT VFR BLD AUTO: 33.4 % (ref 37.5–51)
HGB BLD-MCNC: 11.4 G/DL (ref 13–17.7)
MAGNESIUM SERPL-MCNC: 2 MG/DL (ref 1.6–2.4)
MCH RBC QN AUTO: 31.7 PG (ref 26.6–33)
MCHC RBC AUTO-ENTMCNC: 34.1 G/DL (ref 31.5–35.7)
MCV RBC AUTO: 92.8 FL (ref 79–97)
PLATELET # BLD AUTO: 367 10*3/MM3 (ref 140–450)
PMV BLD AUTO: 8.5 FL (ref 6–12)
POTASSIUM SERPL-SCNC: 3.9 MMOL/L (ref 3.5–5.2)
POTASSIUM SERPL-SCNC: 4 MMOL/L (ref 3.5–5.2)
POTASSIUM SERPL-SCNC: 4 MMOL/L (ref 3.5–5.2)
POTASSIUM SERPL-SCNC: 4.4 MMOL/L (ref 3.5–5.2)
PROT SERPL-MCNC: 6.6 G/DL (ref 6–8.5)
RBC # BLD AUTO: 3.6 10*6/MM3 (ref 4.14–5.8)
SODIUM SERPL-SCNC: 129 MMOL/L (ref 136–145)
SODIUM SERPL-SCNC: 132 MMOL/L (ref 136–145)
VIT B12 BLD-MCNC: 1932 PG/ML (ref 211–946)
WBC NRBC COR # BLD AUTO: 5.66 10*3/MM3 (ref 3.4–10.8)

## 2025-03-23 PROCEDURE — 80053 COMPREHEN METABOLIC PANEL: CPT | Performed by: INTERNAL MEDICINE

## 2025-03-23 PROCEDURE — 25010000002 THIAMINE PER 100 MG: Performed by: PHYSICIAN ASSISTANT

## 2025-03-23 PROCEDURE — 94799 UNLISTED PULMONARY SVC/PX: CPT

## 2025-03-23 PROCEDURE — 94761 N-INVAS EAR/PLS OXIMETRY MLT: CPT

## 2025-03-23 PROCEDURE — 96376 TX/PRO/DX INJ SAME DRUG ADON: CPT

## 2025-03-23 PROCEDURE — 25010000002 ENOXAPARIN PER 10 MG: Performed by: FAMILY MEDICINE

## 2025-03-23 PROCEDURE — 99239 HOSP IP/OBS DSCHRG MGMT >30: CPT | Performed by: INTERNAL MEDICINE

## 2025-03-23 PROCEDURE — 85027 COMPLETE CBC AUTOMATED: CPT | Performed by: INTERNAL MEDICINE

## 2025-03-23 PROCEDURE — 83735 ASSAY OF MAGNESIUM: CPT | Performed by: INTERNAL MEDICINE

## 2025-03-23 PROCEDURE — 82607 VITAMIN B-12: CPT | Performed by: INTERNAL MEDICINE

## 2025-03-23 PROCEDURE — 96372 THER/PROPH/DIAG INJ SC/IM: CPT

## 2025-03-23 RX ORDER — NAPROXEN 250 MG/1
500 TABLET ORAL ONCE AS NEEDED
Status: COMPLETED | OUTPATIENT
Start: 2025-03-23 | End: 2025-03-23

## 2025-03-23 RX ORDER — DOXYCYCLINE 100 MG/1
100 CAPSULE ORAL 2 TIMES DAILY
Qty: 14 CAPSULE | Refills: 0 | Status: SHIPPED | OUTPATIENT
Start: 2025-03-23 | End: 2025-03-30

## 2025-03-23 RX ADMIN — ENOXAPARIN SODIUM 40 MG: 100 INJECTION SUBCUTANEOUS at 08:52

## 2025-03-23 RX ADMIN — TIOTROPIUM BROMIDE 1 CAPSULE: 18 CAPSULE ORAL; RESPIRATORY (INHALATION) at 10:25

## 2025-03-23 RX ADMIN — CETIRIZINE HYDROCHLORIDE 10 MG: 10 TABLET, FILM COATED ORAL at 08:52

## 2025-03-23 RX ADMIN — THIAMINE HYDROCHLORIDE 200 MG: 100 INJECTION, SOLUTION INTRAMUSCULAR; INTRAVENOUS at 06:25

## 2025-03-23 RX ADMIN — NAPROXEN 500 MG: 250 TABLET ORAL at 08:52

## 2025-03-23 RX ADMIN — FOLIC ACID 1 MG: 1 TABLET ORAL at 08:52

## 2025-03-23 RX ADMIN — LISINOPRIL 40 MG: 20 TABLET ORAL at 08:52

## 2025-03-23 RX ADMIN — Medication 10 ML: at 08:52

## 2025-03-23 RX ADMIN — PANTOPRAZOLE SODIUM 40 MG: 40 INJECTION, POWDER, FOR SOLUTION INTRAVENOUS at 06:25

## 2025-03-23 NOTE — DISCHARGE SUMMARY
[facility]         HOSPITALIST  DISCHARGE SUMMARY    Patient Name: Bentley Martínez  : 1965  MRN: [id number]    Date of Admission: 3/21/2025  Date of Discharge:  3/23/2025  Primary Care Physician: Rekha Quevedo APRN    Active and Resolved Hospital Problems:  Altered mental status, delusions.  Resolved  Hyponatremia, improved  History of recent tick bite, history of [place] spotted fever  History of alcohol abuse, at risk for withdrawal  COPD  Arthritis  GERD  Hypertension     Hospital Course     Hospital Course:  Bentley Martínez is a 59 y.o. male with medical history of hypertension, alcohol abuse, COPD, arthritis and GERD presented to the ED for evaluation of altered mental status with delusions/hallucinations.  When seen patient was difficult to arouse after receiving sedating meds.  Patient was brought in to the ED due to him having delusions and confusion where he would think that he kill the president and he  and came back to life.  Of note patient drinks 6-8 beers daily per family.  In the ED patient was hypertensive on arrival with remaining vitals being within normal limits.  Drug screen was positive for THC with labs showing reduced renal function, hyperglycemia, mild leukocytosis, anemia, hypochloremia and hyponatremia.  Remaining labs were relatively unremarkable including a normal troponin, and 0 ethanol level.  CT head was negative for any acute findings and chest x-ray showed mildly increased prominence of the central pulmonary vasculature.  Patient was given 4 mg of Ativan due to severe agitation.  Patient's mentation slowly improved.  Further discussions with patient he no longer had fixed delusions that the president had passed away or that he had .  Some of these concerns appear to been related to a dreamlike state.  Patient's sodium did slowly improve however did remain low, discharging 132.  Patient instructed to continue avoiding alcohol, discharged off  of his hydrochlorothiazide.  Patient and family state he recently was found to have 2 ticks on him.  A history of [place] spotted fever.  Patient stated he has similar hospitalization in the past, and it was found secondary to [place] spotted fever.  Tick panel was sent, however not available at time of discharge.  Patient was sent home on a course of 7 days doxycycline to cover patient.  Patient's mentation returned to normal endorsed by family at bedside prior to discharge.  Patient seen on date of discharge, clinically and hemodynamically stable.  Patient provided concerning signs and symptoms prompting immediate medical attention, patient understanding and agreeable     DISCHARGE Follow Up Recommendations for labs and diagnostics:   Follow-up primary care physician as soon as possible  Patient instructed to stop drinking alcohol      Day of Discharge     Vital Signs:  Temp:  [97.9 °F (36.6 °C)-98.7 °F (37.1 °C)] 97.9 °F (36.6 °C)  Heart Rate:  [72-81] 72  Resp:  [18] 18  BP: (121-136)/(72-86) 121/83  Physical Exam:                 Constitutional: Sitting up in bed resting comfortably, interacting appropriately, alert and oriented x 3.  Good insight              Eyes: PERRLA, sclerae anicteric, no conjunctival injection              HENT: NCAT, mucous membranes moist              Neck: Supple, no thyromegaly, no lymphadenopathy, trachea midline              Respiratory: Clear to auscultation bilaterally, nonlabored respirations               Cardiovascular: RRR, no murmurs, rubs, or gallops, palpable pedal pulses bilaterally              Gastrointestinal: Positive bowel sounds, soft, nondistended              Musculoskeletal: No bilateral ankle edema, no clubbing or cyanosis to extremities              Neurologic: Alert and oriented x 3.  Moving all 4 extremities to command, no pronator drift, cranial nerves grossly intact, no clonus       Discharge Details        Discharge Medications        New  Medications        Instructions Start Date   doxycycline 100 MG capsule  Commonly known as: VIBRAMYCIN   100 mg, Oral, 2 Times Daily             Continue These Medications        Instructions Start Date   albuterol sulfate  (90 Base) MCG/ACT inhaler  Commonly known as: PROVENTIL HFA;VENTOLIN HFA;PROAIR HFA   2 puffs, Inhalation, Every 4 Hours PRN      cetirizine 10 MG tablet  Commonly known as: zyrTEC   10 mg, Oral, Daily      Dulera 200-5 MCG/ACT inhaler  Generic drug: mometasone-formoterol   2 puffs, Inhalation, 2 Times Daily      lisinopril 40 MG tablet  Commonly known as: PRINIVIL,ZESTRIL   40 mg, Oral, Daily      lovastatin 20 MG tablet  Commonly known as: MEVACOR   20 mg, Oral, Every Night at Bedtime      montelukast 10 MG tablet  Commonly known as: SINGULAIR   10 mg, Oral, Every Night at Bedtime      pantoprazole 40 MG EC tablet  Commonly known as: PROTONIX   40 mg, Oral, Daily      simethicone 80 MG chewable tablet  Commonly known as: Gas-X   80 mg, Oral, Every 6 Hours PRN      Spiriva HandiHaler 18 MCG per inhalation capsule  Generic drug: tiotropium   1 capsule, Inhalation, Daily      vitamin B-12 1000 MCG tablet  Commonly known as: CYANOCOBALAMIN   1,000 mcg, Oral, Daily             Stop These Medications      hydroCHLOROthiazide 25 MG tablet              No Known Allergies    Discharge Disposition:  Home or Self Care    Diet:  Hospital:No active diet order      Discharge Activity:   Activity Instructions       Activity as Tolerated              CODE STATUS:  Code Status and Medical Interventions: CPR (Attempt to Resuscitate); Full Support   Ordered at: 03/22/25 0036     Code Status (Patient has no pulse and is not breathing):    CPR (Attempt to Resuscitate)     Medical Interventions (Patient has pulse or is breathing):    Full Support     Level Of Support Discussed With:    Patient         Future Appointments   Date Time Provider Department Center   4/1/2025 10:15 AM DION SMITH EMG NCV 1  [facility]  [facility]   4/7/2025  7:15 AM Rekha Quevedo APRN [facility]   4/16/2025  9:30 AM NURSE/MA PC [facility]   8/20/2025  9:15 AM Darin Bell MD [facility] P[facility] ETW [facility]       Additional Instructions for the Follow-ups that You Need to Schedule       Discharge Follow-up with PCP   As directed       Currently Documented PCP:    Rekha Quevedo APRN    PCP Phone Number:    [phone]     Follow Up Details: In less than one week                Pertinent  and/or Most Recent Results     PROCEDURES:   None     LAB RESULTS:      Lab 03/23/25  0554 03/22/25  0555 03/21/25  1900 03/21/25  1828   WBC 5.66 6.10  --  11.54*   HEMOGLOBIN 11.4* 10.9*  --  11.3*   HEMATOCRIT 33.4* 30.7*  --  32.6*   PLATELETS 367 359  --  371   NEUTROS ABS  --   --   --  9.51*   IMMATURE GRANS (ABS)  --   --   --  0.05   LYMPHS ABS  --   --   --  0.87   MONOS ABS  --   --   --  0.96*   EOS ABS  --   --   --  0.09   MCV 92.8 89.8  --  88.8   LACTATE  --   --  0.6  --          Lab 03/23/25  1158 03/23/25  0554 03/23/25  0004 03/22/25  1811 03/22/25  1203 03/22/25  0555 03/21/25  1828   SODIUM 132* 132*  132* 129* 127* 130* 128* 123*   POTASSIUM 4.4 4.0  4.0 3.9 4.0 3.8 4.0 4.2   CHLORIDE 97* 97*  97* 96* 94* 95* 92* 86*   CO2 19.8* 21.9*  21.9* 23.4 23.8 23.4 22.0 21.8*   ANION GAP 15.2* 13.1  13.1 9.6 9.2 11.6 14.0 15.2*   BUN 14 13  13 16 14 12 13 15   CREATININE 1.14 1.06  1.06 1.10 1.16 0.99 1.10 1.38*   EGFR 73.6 80.3  80.3 76.9 72.1 87.2 76.9 58.5*   GLUCOSE 100* 93  93 104* 139* 125* 116* 163*   CALCIUM 9.2 9.0  9.0 9.1 9.2 8.8 9.2 9.9   MAGNESIUM  --  2.0  --   --   --   --  2.1   TSH  --   --   --   --   --  1.340  --          Lab 03/23/25  0554 03/21/25  1828   TOTAL PROTEIN 6.6 7.5   ALBUMIN 4.1 4.7   GLOBULIN 2.5 2.8   ALT (SGPT) 16 22   AST (SGOT) 21 29   BILIRUBIN 0.3 0.2   ALK PHOS 51 63         Lab 03/21/25 1942 03/21/25  1828   HSTROP T 19 17             Lab 03/23/25  0554 03/22/25  0857   FOLATE   --  18.60   VITAMIN B 12 1,932*  --          Lab 03/21/25  1900   PH, ARTERIAL 7.435   PCO2, ARTERIAL 34.3*   PO2 ART 78.8*   O2 SATURATION ART 96.1   FIO2 21   HCO3 ART 23.0   BASE EXCESS ART -0.8     Brief Urine Lab Results  (Last result in the past 365 days)        Color   Clarity   Blood   Leuk Est   Nitrite   Protein   CREAT   Urine HCG        03/21/25 1840 Yellow   Clear   Negative   Negative   Negative   Negative                 Microbiology Results (last 10 days)       ** No results found for the last 240 hours. **            XR Chest 1 View  Result Date: 3/21/2025  Impression: Impression: Mildly increased prominence of central pulmonary vasculature/apparent perihilar interstitial opacities, which may be related to differences in technique, mild pulmonary edema, or viral/atypical infection. Electronically Signed: Isiah Lopez  3/21/2025 7:45 PM EDT  Workstation ID: [id number]    CT Head Without Contrast  Result Date: 3/21/2025  Impression: Impression: No acute intracranial findings. 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 2.Mild soft tissue prominence at the interphalangeal joints. 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 2.Mild soft tissue prominence at the interphalangeal joints. Electronically Signed: Tyson Pichardo  3/12/2025 5:12 PM EDT  Workstation ID: [id number]                  Labs Pending at Discharge:  Pending Labs       Order Current Status    Ehrlichia Profile DNA PCR In process     Lyme Disease Total Antibody With Reflex to Immunoassay In process    Rickettsia Species DNA, Real-Time PCR In process              Time spent on Discharge including face to face service:  40 minutes    Electronically signed by Tommy Shi MD, 03/23/25, 5:29 PM EDT.

## 2025-03-23 NOTE — PLAN OF CARE
"Goal Outcome Evaluation:  Plan of Care Reviewed With: patient        Progress: improving  Outcome Evaluation: Pt has been completely A&Ox4 this shift. VSS. CIWA protocol followed q4hr still. Pt up with stand-by assistance. Had shower this shift. Pt states he did have an \"episode\" approx 2yr ago where he got confused and was hallucinating and nephew took him to ER and was diagnosed with [place] spotted fever r/t tic bite. Tic panel still pending. Bed alert in use, call light in reach, able to make needs known. No c/o pain or discomfort this shift. No further issues/needs at this time.                             "

## 2025-03-23 NOTE — PLAN OF CARE
Goal Outcome Evaluation:  Plan of Care Reviewed With: patient        Progress: no change  Outcome Evaluation: alert and oriented x 4. up ad lise. CIWA protocol discontinued this shift per orders. pt discharging home.

## 2025-03-23 NOTE — OUTREACH NOTE
Prep Survey      Flowsheet Row Responses   [facility] patient discharged from? [facility]   Is LACE score < 7 ? No   Eligibility [facility]   Date of Admission 03/21/25   Date of Discharge 03/23/25   Discharge Disposition Home or Self Care   Discharge diagnosis Hyponatremia   Does the patient have one of the following disease processes/diagnoses(primary or secondary)? Other   Does the patient have Home health ordered? No   Is there a DME ordered? No   Prep survey completed? Yes            Chula KESSLER - Registered Nurse

## 2025-03-24 ENCOUNTER — TRANSITIONAL CARE MANAGEMENT TELEPHONE ENCOUNTER (OUTPATIENT)
Dept: CALL CENTER | Facility: HOSPITAL | Age: 60
End: 2025-03-24
Payer: MEDICAID

## 2025-03-24 NOTE — OUTREACH NOTE
Call Center TCM Note      Flowsheet Row Responses   [facility] patient discharged from? [facility]   Does the patient have one of the following disease processes/diagnoses(primary or secondary)? Other   TCM attempt successful? No   Unsuccessful attempts Attempt 2  [noone listed on release, no voicemail option]            Herberth Carl RN    3/24/2025, 15:26 EDT

## 2025-03-24 NOTE — OUTREACH NOTE
Call Center TCM Note      Flowsheet Row Responses   [facility] patient discharged from? [facility]   Does the patient have one of the following disease processes/diagnoses(primary or secondary)? Other   TCM attempt successful? No   Unsuccessful attempts Attempt 1  [no one listed on release]   Call Status --  [no voicemail option]            Herberth Carl RN    3/24/2025, 15:03 EDT

## 2025-03-25 ENCOUNTER — OFFICE VISIT (OUTPATIENT)
Dept: FAMILY MEDICINE CLINIC | Facility: CLINIC | Age: 60
End: 2025-03-25
Payer: MEDICAID

## 2025-03-25 ENCOUNTER — TRANSITIONAL CARE MANAGEMENT TELEPHONE ENCOUNTER (OUTPATIENT)
Dept: CALL CENTER | Facility: HOSPITAL | Age: 60
End: 2025-03-25
Payer: MEDICAID

## 2025-03-25 VITALS
HEIGHT: 68 IN | WEIGHT: 144.8 LBS | DIASTOLIC BLOOD PRESSURE: 82 MMHG | SYSTOLIC BLOOD PRESSURE: 152 MMHG | TEMPERATURE: 96.5 F | BODY MASS INDEX: 21.95 KG/M2 | HEART RATE: 68 BPM | OXYGEN SATURATION: 100 %

## 2025-03-25 DIAGNOSIS — F10.10 ALCOHOL ABUSE: ICD-10-CM

## 2025-03-25 DIAGNOSIS — Z09 HOSPITAL DISCHARGE FOLLOW-UP: Primary | ICD-10-CM

## 2025-03-25 DIAGNOSIS — I10 ESSENTIAL HYPERTENSION: ICD-10-CM

## 2025-03-25 DIAGNOSIS — J45.41 MODERATE PERSISTENT ASTHMA WITH ACUTE EXACERBATION: ICD-10-CM

## 2025-03-25 DIAGNOSIS — E87.1 HYPONATREMIA: ICD-10-CM

## 2025-03-25 LAB — B BURGDOR IGG+IGM SER QL IA: NEGATIVE

## 2025-03-25 RX ORDER — PREDNISONE 20 MG/1
20 TABLET ORAL 2 TIMES DAILY
Qty: 10 TABLET | Refills: 0 | Status: SHIPPED | OUTPATIENT
Start: 2025-03-25 | End: 2025-03-30

## 2025-03-25 NOTE — OUTREACH NOTE
Call Center TCM Note      Flowsheet Row Responses   [facility] patient discharged from? [facility]   Does the patient have one of the following disease processes/diagnoses(primary or secondary)? Other   TCM attempt successful? Yes   Discharge diagnosis Hyponatremia   Comments Pt completed PCP fu this am. No TCM call needed. TCM complete.   TCM call completed? Yes            KAN OLMSTEAD - Registered Nurse    3/25/2025, 13:41 EDT

## 2025-03-25 NOTE — PROGRESS NOTES
Transitional Care Follow Up Visit  Subjective     Bentley Martínez is a 59 y.o. male who presents for a transitional care management visit.    Within 48 business hours after discharge our office contacted him via telephone to coordinate his care and needs.      I reviewed and discussed the details of that call along with the discharge summary, hospital problems, inpatient lab results, inpatient diagnostic studies, and consultation reports with Bentley.     Current outpatient and discharge medications have been reconciled for the patient.  Reviewed by: RAY Britt          3/23/2025     5:41 PM   Date of TCM Phone Call   [facility]   Date of Admission 3/21/2025   Date of Discharge 3/23/2025   Discharge Disposition Home or Self Care     Risk for Readmission (LACE) Score: 8 (3/23/2025  6:00 AM)      History of Present Illness   Course During Hospital Stay:    History of Present Illness  The patient presents for a hospital follow-up.    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 He was not dizzy. After returning home, he consumed approximately 1.5 beers. His cohabitant observed unusual behavior, prompting her to contact his family. He experienced a series of hallucinations, including the belief that his brother had , that he had assassinated President [place], and that he himself had . He was subsequently transported to the emergency room via ambulance. During his hospital stay, he reported hearing discussions about significant blood loss and the insertion of a catheter, although he is uncertain if these events actually occurred. He was tested for every drug and the only thing found in his system was marijuana. He has been abstaining from alcohol since 2025 and does not intend to resume drinking while on antibiotics. He typically consumes 6 to 8 beers daily and uses marijuana daily but reports no other drug use. This incident wilson his second episode of  hallucinations, with the first occurring in [place] when he was not under the influence of alcohol. He was diagnosed with [place] spotted fever following a tick bite and suspects this may have contributed to his hallucinations. He has had 2 recent tick bites within the past month and is currently awaiting test results. He was prescribed doxycycline as a precautionary measure.    He also reports persistent hand numbness, which has resulted in him dropping objects. He has noticed new nodules on his hands that were not present 2 to 3 months ago. He underwent hand x-rays approximately a month ago and has an upcoming EMG test scheduled for 04/01/2025.    He has been experiencing worsening wheezing over the past few days, which he attributes to inconsistent medication administration during his hospital stay. He is currently using a Dulera inhaler twice daily and an albuterol inhaler as needed. He has expressed a preference for steroid injections over oral steroids.    His blood pressure was low when he went to bed last night, but it was 159/89 when he woke up this morning. He did not take his lisinopril this morning. His blood pressure was 179 when he arrived at the office today, but it has since come down to 150. His blood pressure was normal during his hospital stay.    SOCIAL HISTORY  He drinks 6-8 beers per day. He smokes marijuana everyday.    MEDICATIONS  Current: Doxycycline, lisinopril, Dulera inhaler, albuterol inhaler, Spiriva.  Discontinued: Hydrochlorothiazide.       Results  Laboratory Studies  Sodium level was low.       The following portions of the patient's history were reviewed and updated as appropriate: allergies, current medications, past family history, past medical history, past social history, past surgical history, and problem list.    Review of Systems   Constitutional:  Negative for chills and fatigue.   Respiratory:  Positive for wheezing. Negative for cough and shortness of breath.     Cardiovascular:  Negative for chest pain, palpitations and leg swelling.   Musculoskeletal:  Positive for arthralgias.   Neurological:  Positive for numbness. Negative for dizziness and syncope.   Psychiatric/Behavioral:  Negative for confusion, hallucinations, self-injury and suicidal ideas. The patient is nervous/anxious.        Current Outpatient Medications:     albuterol sulfate  (90 Base) MCG/ACT inhaler, Inhale 2 puffs Every 4 (Four) Hours As Needed for Wheezing or Shortness of Air., Disp: 8 g, Rfl: 11    cetirizine (zyrTEC) 10 MG tablet, Take 1 tablet by mouth Daily., Disp: 90 tablet, Rfl: 3    doxycycline (VIBRAMYCIN) 100 MG capsule, Take 1 capsule by mouth 2 (Two) Times a Day for 7 days., Disp: 14 capsule, Rfl: 0    Dulera 200-5 MCG/ACT inhaler, Inhale 2 puffs 2 (Two) Times a Day., Disp: 13 g, Rfl: 11    lisinopril (PRINIVIL,ZESTRIL) 40 MG tablet, Take 1 tablet by mouth Daily. Indications: High Blood Pressure, Disp: 30 tablet, Rfl: 5    lovastatin (MEVACOR) 20 MG tablet, Take 1 tablet by mouth every night at bedtime. Indications: High Amount of Fats in the Blood, Disp: 30 tablet, Rfl: 5    montelukast (SINGULAIR) 10 MG tablet, Take 1 tablet by mouth every night at bedtime., Disp: 90 tablet, Rfl: 3    pantoprazole (PROTONIX) 40 MG EC tablet, Take 1 tablet by mouth Daily. Indications: Gastroesophageal Reflux Disease, Disp: 30 tablet, Rfl: 5    simethicone (Gas-X) 80 MG chewable tablet, Chew 1 tablet Every 6 (Six) Hours As Needed for Flatulence., Disp: 30 tablet, Rfl: 5    Spiriva HandiHaler 18 MCG per inhalation capsule, Place 1 capsule into inhaler and inhale Daily., Disp: 30 capsule, Rfl: 11    vitamin B-12 (CYANOCOBALAMIN) 1000 MCG tablet, Take 1 tablet by mouth Daily. Indications: Inadequate Vitamin B12, Disp: 30 tablet, Rfl: 5    predniSONE (DELTASONE) 20 MG tablet, Take 1 tablet by mouth 2 (Two) Times a Day for 5 days. Indications: asthma exacerbation, Disp: 10 tablet, Rfl: 0  Objective    Physical Exam  Vitals reviewed.   Constitutional:       Appearance: Normal appearance. He is well-developed.   Neck:      Thyroid: No thyroid mass, thyromegaly or thyroid tenderness.   Cardiovascular:      Rate and Rhythm: Normal rate and regular rhythm.      Heart sounds: No murmur heard.     No friction rub. No gallop.   Pulmonary:      Effort: Pulmonary effort is normal.      Breath sounds: Wheezing present. No rhonchi.      Comments: Scattered mild wheezing noted bilaterally.  Musculoskeletal:      Right hand: Deformity present. No swelling.   Lymphadenopathy:      Cervical: No cervical adenopathy.   Skin:     General: Skin is warm and dry.   Neurological:      Mental Status: He is alert and oriented to person, place, and time.      Cranial Nerves: No cranial nerve deficit.   Psychiatric:         Mood and Affect: Mood and affect normal.         Behavior: Behavior normal.         Thought Content: Thought content normal. Thought content does not include homicidal or suicidal ideation.         Judgment: Judgment normal.       Physical Exam  Wheezing noted in the lungs.  There are raised lesions on the left middle finger.    Vital Signs  Blood pressure is 150 systolic.         Assessment & Plan   Problems Addressed this Visit          Cardiac and Vasculature    Essential hypertension       Genitourinary and Reproductive     Hyponatremia       Mental Health    Alcohol abuse       Pulmonary and Pneumonias    Asthma    Relevant Medications    predniSONE (DELTASONE) 20 MG tablet     Other Visit Diagnoses         Hospital discharge follow-up    -  Primary          Diagnoses         Codes Comments      Hospital discharge follow-up    -  Primary ICD-10-CM: Z09  ICD-9-CM: V67.59       Essential hypertension     ICD-10-CM: I10  ICD-9-CM: 401.9       Hyponatremia     ICD-10-CM: E87.1  ICD-9-CM: 276.1       Alcohol abuse     ICD-10-CM: F10.10  ICD-9-CM: 305.00       Moderate persistent asthma with acute exacerbation      ICD-10-CM: J45.41  ICD-9-CM: 493.92             Assessment & Plan  1. Post-hospitalization follow-up.  His sodium levels were found to be low during his hospital stay, likely due to his hydrochlorothiazide medication, which has since been discontinued. 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 He is currently on a 7-day course of doxycycline. He is advised to continue his current course of doxycycline and to take it with food to prevent potential stomach upset. If the tick titer results return positive, an extension of the doxycycline course will be considered.    2. Hypertension.  His blood pressure was elevated at 179 systolic upon arrival but decreased to 150 systolic after resting. He is advised to resume his lisinopril 40 mg regimen and discontinue hydrochlorothiazide.    3. Asthma.  He is currently using his Dulera inhaler twice a day and his albuterol inhaler as needed. A prescription for prednisone, to be taken twice daily with meals, has been provided and [facility] to [facility]. He is instructed to use his albuterol inhaler every 4 to 6 hours for the next few days to manage his wheezing.    4. Carpal tunnel syndrome.  He has an EMG study scheduled for 04/01/2025 at 10:15 AM to evaluate for carpal tunnel syndrome. If carpal tunnel syndrome is confirmed, surgical intervention may be considered.    Follow-up  The patient is scheduled for an annual physical and a 6-month follow-up on 04/07/2025.       Return for Keep Scheduled Follow-up.    Patient or patient representative verbalized consent for the use of Ambient Listening during the visit with  RAY Britt for chart documentation. 3/25/2025  08:20 EDT     Parts of this note are electronic transcriptions/translations of spoken language to printed text using the Dragon Dictation system.    RAY Britt  03/25/2025

## 2025-03-27 LAB
A PHAGOCYTOPH DNA BLD QL NAA+PROBE: NEGATIVE
EHRLICHIA DNA SPEC QL NAA+PROBE: NEGATIVE

## 2025-03-28 LAB — RICKETTSIA RICKETTSII DNA, RT: NOT DETECTED

## 2025-04-01 ENCOUNTER — HOSPITAL ENCOUNTER (OUTPATIENT)
Facility: HOSPITAL | Age: 60
Discharge: HOME | End: 2025-04-01
Admitting: NURSE PRACTITIONER
Payer: MEDICAID

## 2025-04-01 DIAGNOSIS — G56.03 BILATERAL CARPAL TUNNEL SYNDROME: ICD-10-CM

## 2025-04-01 DIAGNOSIS — M79.642 BILATERAL HAND PAIN: ICD-10-CM

## 2025-04-01 DIAGNOSIS — R20.2 NUMBNESS AND TINGLING IN BOTH HANDS: ICD-10-CM

## 2025-04-01 DIAGNOSIS — M79.641 BILATERAL HAND PAIN: ICD-10-CM

## 2025-04-01 DIAGNOSIS — R20.0 NUMBNESS AND TINGLING IN BOTH HANDS: ICD-10-CM

## 2025-04-01 PROCEDURE — 95886 MUSC TEST DONE W/N TEST COMP: CPT

## 2025-04-01 PROCEDURE — 95911 NRV CNDJ TEST 9-10 STUDIES: CPT

## 2025-04-02 LAB
QT INTERVAL: 360 MS
QTC INTERVAL: 450 MS

## 2025-04-03 ENCOUNTER — PATIENT ROUNDING (BHMG ONLY) (OUTPATIENT)
Dept: FAMILY MEDICINE CLINIC | Facility: CLINIC | Age: 60
End: 2025-04-03
Payer: MEDICAID

## 2025-04-07 ENCOUNTER — OFFICE VISIT (OUTPATIENT)
Dept: FAMILY MEDICINE CLINIC | Facility: CLINIC | Age: 60
End: 2025-04-07
Payer: MEDICAID

## 2025-04-07 VITALS
TEMPERATURE: 97.9 F | SYSTOLIC BLOOD PRESSURE: 168 MMHG | HEIGHT: 68 IN | BODY MASS INDEX: 21.34 KG/M2 | WEIGHT: 140.8 LBS | DIASTOLIC BLOOD PRESSURE: 98 MMHG | HEART RATE: 84 BPM | RESPIRATION RATE: 16 BRPM | OXYGEN SATURATION: 98 %

## 2025-04-07 DIAGNOSIS — Z00.00 ANNUAL PHYSICAL EXAM: Primary | ICD-10-CM

## 2025-04-07 DIAGNOSIS — K21.9 GASTROESOPHAGEAL REFLUX DISEASE WITHOUT ESOPHAGITIS: ICD-10-CM

## 2025-04-07 DIAGNOSIS — L72.3 SEBACEOUS CYST: ICD-10-CM

## 2025-04-07 DIAGNOSIS — I10 ESSENTIAL HYPERTENSION: ICD-10-CM

## 2025-04-07 DIAGNOSIS — E78.2 MIXED HYPERLIPIDEMIA: ICD-10-CM

## 2025-04-07 DIAGNOSIS — J45.40 MODERATE PERSISTENT ASTHMA, UNSPECIFIED WHETHER COMPLICATED: ICD-10-CM

## 2025-04-07 DIAGNOSIS — R79.89 LOW VITAMIN B12 LEVEL: ICD-10-CM

## 2025-04-07 DIAGNOSIS — Z23 NEED FOR SHINGLES VACCINE: ICD-10-CM

## 2025-04-07 DIAGNOSIS — R73.03 PREDIABETES: ICD-10-CM

## 2025-04-07 DIAGNOSIS — K92.89 GAS BLOAT SYNDROME: ICD-10-CM

## 2025-04-07 DIAGNOSIS — Z12.5 SCREENING FOR PROSTATE CANCER: ICD-10-CM

## 2025-04-07 LAB
ANION GAP SERPL CALCULATED.3IONS-SCNC: 13.1 MMOL/L (ref 5–15)
BASOPHILS # BLD AUTO: 0.09 10*3/MM3 (ref 0–0.2)
BASOPHILS NFR BLD AUTO: 1.3 % (ref 0–1.5)
BUN SERPL-MCNC: 9 MG/DL (ref 8–23)
BUN/CREAT SERPL: 7.4 (ref 7–25)
C TRACH RRNA CVX QL NAA+PROBE: NOT DETECTED
CALCIUM SPEC-SCNC: 9.3 MG/DL (ref 8.6–10.5)
CHLORIDE SERPL-SCNC: 100 MMOL/L (ref 98–107)
CHOLEST SERPL-MCNC: 209 MG/DL (ref 0–200)
CO2 SERPL-SCNC: 22.9 MMOL/L (ref 22–29)
CREAT SERPL-MCNC: 1.22 MG/DL (ref 0.76–1.27)
DEPRECATED RDW RBC AUTO: 44.7 FL (ref 37–54)
EGFRCR SERPLBLD CKD-EPI 2021: 67.9 ML/MIN/1.73
EOSINOPHIL # BLD AUTO: 0.66 10*3/MM3 (ref 0–0.4)
EOSINOPHIL NFR BLD AUTO: 9.3 % (ref 0.3–6.2)
ERYTHROCYTE [DISTWIDTH] IN BLOOD BY AUTOMATED COUNT: 13.1 % (ref 12.3–15.4)
FOLATE SERPL-MCNC: 14.7 NG/ML (ref 4.78–24.2)
GLUCOSE SERPL-MCNC: 81 MG/DL (ref 65–99)
HBA1C MFR BLD: 5.5 % (ref 4.8–5.6)
HCT VFR BLD AUTO: 41 % (ref 37.5–51)
HDLC SERPL-MCNC: 72 MG/DL (ref 40–60)
HGB BLD-MCNC: 13.4 G/DL (ref 13–17.7)
IMM GRANULOCYTES # BLD AUTO: 0.04 10*3/MM3 (ref 0–0.05)
IMM GRANULOCYTES NFR BLD AUTO: 0.6 % (ref 0–0.5)
LDLC SERPL CALC-MCNC: 124 MG/DL (ref 0–100)
LDLC/HDLC SERPL: 1.69 {RATIO}
LYMPHOCYTES # BLD AUTO: 1.72 10*3/MM3 (ref 0.7–3.1)
LYMPHOCYTES NFR BLD AUTO: 24.3 % (ref 19.6–45.3)
MCH RBC QN AUTO: 30.5 PG (ref 26.6–33)
MCHC RBC AUTO-ENTMCNC: 32.7 G/DL (ref 31.5–35.7)
MCV RBC AUTO: 93.4 FL (ref 79–97)
MONOCYTES # BLD AUTO: 0.84 10*3/MM3 (ref 0.1–0.9)
MONOCYTES NFR BLD AUTO: 11.8 % (ref 5–12)
N GONORRHOEA RRNA SPEC QL NAA+PROBE: NOT DETECTED
NEUTROPHILS NFR BLD AUTO: 3.74 10*3/MM3 (ref 1.7–7)
NEUTROPHILS NFR BLD AUTO: 52.7 % (ref 42.7–76)
NRBC BLD AUTO-RTO: 0 /100 WBC (ref 0–0.2)
PLATELET # BLD AUTO: 668 10*3/MM3 (ref 140–450)
PMV BLD AUTO: 8.6 FL (ref 6–12)
POTASSIUM SERPL-SCNC: 4.6 MMOL/L (ref 3.5–5.2)
PSA SERPL-MCNC: 1.09 NG/ML (ref 0–4)
RBC # BLD AUTO: 4.39 10*6/MM3 (ref 4.14–5.8)
RPR SER QL: NORMAL
SODIUM SERPL-SCNC: 136 MMOL/L (ref 136–145)
TREPONEMA PALLIDUM IGG+IGM AB [PRESENCE] IN SERUM OR PLASMA BY IMMUNOASSAY: NORMAL
TRIGL SERPL-MCNC: 75 MG/DL (ref 0–150)
VIT B12 BLD-MCNC: 1312 PG/ML (ref 211–946)
VLDLC SERPL-MCNC: 13 MG/DL (ref 5–40)
WBC NRBC COR # BLD AUTO: 7.09 10*3/MM3 (ref 3.4–10.8)

## 2025-04-07 PROCEDURE — 85025 COMPLETE CBC W/AUTO DIFF WBC: CPT | Performed by: NURSE PRACTITIONER

## 2025-04-07 PROCEDURE — 1160F RVW MEDS BY RX/DR IN RCRD: CPT | Performed by: NURSE PRACTITIONER

## 2025-04-07 PROCEDURE — 1159F MED LIST DOCD IN RCRD: CPT | Performed by: NURSE PRACTITIONER

## 2025-04-07 PROCEDURE — 82607 VITAMIN B-12: CPT | Performed by: NURSE PRACTITIONER

## 2025-04-07 PROCEDURE — 83921 ORGANIC ACID SINGLE QUANT: CPT | Performed by: NURSE PRACTITIONER

## 2025-04-07 PROCEDURE — 87591 N.GONORRHOEAE DNA AMP PROB: CPT | Performed by: NURSE PRACTITIONER

## 2025-04-07 PROCEDURE — 80061 LIPID PANEL: CPT | Performed by: NURSE PRACTITIONER

## 2025-04-07 PROCEDURE — 3077F SYST BP >= 140 MM HG: CPT | Performed by: NURSE PRACTITIONER

## 2025-04-07 PROCEDURE — 83036 HEMOGLOBIN GLYCOSYLATED A1C: CPT | Performed by: NURSE PRACTITIONER

## 2025-04-07 PROCEDURE — 86592 SYPHILIS TEST NON-TREP QUAL: CPT | Performed by: NURSE PRACTITIONER

## 2025-04-07 PROCEDURE — 1126F AMNT PAIN NOTED NONE PRSNT: CPT | Performed by: NURSE PRACTITIONER

## 2025-04-07 PROCEDURE — 87491 CHLMYD TRACH DNA AMP PROBE: CPT | Performed by: NURSE PRACTITIONER

## 2025-04-07 PROCEDURE — 3078F DIAST BP <80 MM HG: CPT | Performed by: NURSE PRACTITIONER

## 2025-04-07 PROCEDURE — 99396 PREV VISIT EST AGE 40-64: CPT | Performed by: NURSE PRACTITIONER

## 2025-04-07 PROCEDURE — 82746 ASSAY OF FOLIC ACID SERUM: CPT | Performed by: NURSE PRACTITIONER

## 2025-04-07 PROCEDURE — 80048 BASIC METABOLIC PNL TOTAL CA: CPT | Performed by: NURSE PRACTITIONER

## 2025-04-07 PROCEDURE — G0103 PSA SCREENING: HCPCS | Performed by: NURSE PRACTITIONER

## 2025-04-07 PROCEDURE — 86780 TREPONEMA PALLIDUM: CPT | Performed by: NURSE PRACTITIONER

## 2025-04-07 RX ORDER — ZOSTER VACCINE RECOMBINANT, ADJUVANTED 50 MCG/0.5
0.5 KIT INTRAMUSCULAR ONCE
Qty: 1 EACH | Refills: 1 | Status: SHIPPED | OUTPATIENT
Start: 2025-04-07 | End: 2025-04-07

## 2025-04-07 RX ORDER — LISINOPRIL 40 MG/1
40 TABLET ORAL DAILY
Qty: 30 TABLET | Refills: 5 | Status: SHIPPED | OUTPATIENT
Start: 2025-04-07

## 2025-04-07 RX ORDER — AMLODIPINE BESYLATE 2.5 MG/1
2.5 TABLET ORAL NIGHTLY
Qty: 30 TABLET | Refills: 2 | Status: SHIPPED | OUTPATIENT
Start: 2025-04-07

## 2025-04-07 RX ORDER — LANOLIN ALCOHOL/MO/W.PET/CERES
1000 CREAM (GRAM) TOPICAL DAILY
Qty: 30 TABLET | Refills: 5 | Status: CANCELLED | OUTPATIENT
Start: 2025-04-07

## 2025-04-07 RX ORDER — PANTOPRAZOLE SODIUM 40 MG/1
40 TABLET, DELAYED RELEASE ORAL DAILY
Qty: 30 TABLET | Refills: 5 | Status: SHIPPED | OUTPATIENT
Start: 2025-04-07

## 2025-04-07 RX ORDER — SIMETHICONE 80 MG
80 TABLET,CHEWABLE ORAL EVERY 6 HOURS PRN
Qty: 30 TABLET | Refills: 5 | Status: SHIPPED | OUTPATIENT
Start: 2025-04-07

## 2025-04-07 RX ORDER — LOVASTATIN 20 MG/1
20 TABLET ORAL
Qty: 30 TABLET | Refills: 5 | Status: SHIPPED | OUTPATIENT
Start: 2025-04-07

## 2025-04-07 NOTE — ASSESSMENT & PLAN NOTE
Orders:    lovastatin (MEVACOR) 20 MG tablet; Take 1 tablet by mouth every night at bedtime. Indications: High Amount of Fats in the Blood    Lipid Panel

## 2025-04-07 NOTE — ASSESSMENT & PLAN NOTE
Orders:    pantoprazole (PROTONIX) 40 MG EC tablet; Take 1 tablet by mouth Daily. Indications: Gastroesophageal Reflux Disease

## 2025-04-07 NOTE — PROGRESS NOTES
Chief Complaint  Hypertension, Hyperlipidemia, and Annual Exam    Patient or patient representative verbalized consent for the use of Ambient Listening during the visit with  RAY Britt for chart documentation. 4/7/2025  07:46 EDT    Subjective            Bentley Martínez is a 59 y.o. male who presents to [facility]   History of Present Illness  History of Present Illness  The patient presents for an annual physical exam and follow-up on hypertension, prediabetes, anemia, hallucinations, asthma, and sebaceous cyst.    He has been monitoring his blood pressure at home, which has been fluctuating between normal and elevated levels. He is currently on a daily regimen of lisinopril 40 mg, taken in the morning, and ensures adherence to the medication schedule. He reports no associated symptoms such as headaches or dizziness. He has a scheduled appointment with the hypertension clinic on 04/16/2025. He was previously prescribed hydrochlorothiazide but discontinued it due to excessive sodium loss.    He has expressed interest in obtaining a Navage device for his asthma management. He is under the care of a pulmonologist for his asthma, with annual follow-ups. He uses Spiriva and Dulera inhalers.    He has a sebaceous cyst that exudes a foul-smelling pus when squeezed. He has refrained from manipulating it recently but notes that it has increased in size. He had a similar cyst on his neck that was surgically removed.    He has experienced two episodes of hallucinations, the most recent occurring on 03/21/2025, which necessitated a visit to the [facility] Emergency Room. The attending physician suspected delirium tremens (DTs) due to alcohol withdrawal, but he refutes this, attributing the hallucinations to a tick bite.  He states that a few years ago he had hallucinations and they never found a reason for the hallucinations until he had tick titers done which showed  "[place] spotted fever.  They did titers in the hospital most recently in all of them were negative, no Lyme's disease or [place] spotted fever.  He has a history of incarceration for 7.5 years in [place], during which he received psychiatric care.  He denies having any more hallucinations since his hospitalization.    SOCIAL HISTORY  The patient quit smoking cigarettes over 36 years ago. He smokes marijuana and drinks 6-8 beers a day.    FAMILY HISTORY  The patient's father  of heart failure and had cancer.    MEDICATIONS  Current: lisinopril, lovastatin, pantoprazole, simethicone, Spiriva, Dulera    IMMUNIZATIONS  The patient is up to date with his flu shot, pneumonia shot, and tetanus shot.          Tobacco Use: Medium Risk (2025)    Patient History     Smoking Tobacco Use: Former     Smokeless Tobacco Use: Never     Passive Exposure: Never      E-cigarette/Vaping    E-cigarette/Vaping Use Never User     Passive Exposure No     Counseling Given No      E-cigarette/Vaping Substances    Nicotine No     THC No     CBD No     Flavoring No      E-cigarette/Vaping Devices    Disposable No     Pre-filled or Refillable Cartridge No     Refillable Tank No     Pre-filled Pod No        Alcohol Use: Alcohol Misuse (3/22/2025)    AUDIT-C     Frequency of Alcohol Consumption: 4 or more times a week     Average Number of Drinks: 7 to 9     Frequency of Binge Drinking: Daily or almost daily         Objective   Vital Signs:   Vitals:    25 0724 25 0732   BP: 166/75 168/98   Pulse: 84    Resp: 16    Temp: 97.9 °F (36.6 °C)    SpO2: 98%    Weight: 63.9 kg (140 lb 12.8 oz)    Height: 172.7 cm (68\")    PainSc: 0-No pain      Body mass index is 21.41 kg/m².    Wt Readings from Last 3 Encounters:   25 63.9 kg (140 lb 12.8 oz)   25 65.7 kg (144 lb 12.8 oz)   25 64.1 kg (141 lb 5 oz)     BP Readings from Last 3 Encounters:   25 168/98   25 152/82   25 121/83       Health " "Maintenance   Topic Date Due    PROSTATE CANCER SCREENING  04/05/2025    ANNUAL PHYSICAL  04/05/2025    ZOSTER VACCINE (1 of 2) 04/21/2025 (Originally 7/5/2014)    INFLUENZA VACCINE  07/01/2025    LIPID PANEL  10/07/2025    TDAP/TD VACCINES (2 - Td or Tdap) 11/03/2032    COLORECTAL CANCER SCREENING  10/30/2033    HEPATITIS C SCREENING  Completed    COVID-19 Vaccine  Completed    Pneumococcal Vaccine 50+  Completed       /98   Pulse 84   Temp 97.9 °F (36.6 °C)   Resp 16   Ht 172.7 cm (68\")   Wt 63.9 kg (140 lb 12.8 oz)   SpO2 98%   BMI 21.41 kg/m²       Current Outpatient Medications:     albuterol sulfate  (90 Base) MCG/ACT inhaler, Inhale 2 puffs Every 4 (Four) Hours As Needed for Wheezing or Shortness of Air., Disp: 8 g, Rfl: 11    cetirizine (zyrTEC) 10 MG tablet, Take 1 tablet by mouth Daily., Disp: 90 tablet, Rfl: 3    Dulera 200-5 MCG/ACT inhaler, Inhale 2 puffs 2 (Two) Times a Day., Disp: 13 g, Rfl: 11    lisinopril (PRINIVIL,ZESTRIL) 40 MG tablet, Take 1 tablet by mouth Daily. Indications: High Blood Pressure, Disp: 30 tablet, Rfl: 5    lovastatin (MEVACOR) 20 MG tablet, Take 1 tablet by mouth every night at bedtime. Indications: High Amount of Fats in the Blood, Disp: 30 tablet, Rfl: 5    montelukast (SINGULAIR) 10 MG tablet, Take 1 tablet by mouth every night at bedtime., Disp: 90 tablet, Rfl: 3    pantoprazole (PROTONIX) 40 MG EC tablet, Take 1 tablet by mouth Daily. Indications: Gastroesophageal Reflux Disease, Disp: 30 tablet, Rfl: 5    simethicone (Gas-X) 80 MG chewable tablet, Chew 1 tablet Every 6 (Six) Hours As Needed for Flatulence., Disp: 30 tablet, Rfl: 5    Spiriva HandiHaler 18 MCG per inhalation capsule, Place 1 capsule into inhaler and inhale Daily., Disp: 30 capsule, Rfl: 11    amLODIPine (NORVASC) 2.5 MG tablet, Take 1 tablet by mouth Every Night. Indications: High Blood Pressure, Disp: 30 tablet, Rfl: 2    vitamin B-12 (CYANOCOBALAMIN) 1000 MCG tablet, Take 1 tablet by " mouth Daily. Indications: Inadequate Vitamin B12 (Patient not taking: Reported on 4/7/2025), Disp: 30 tablet, Rfl: 5    Zoster Vac Recomb Adjuvanted (Shingrix) 50 MCG/0.5ML reconstituted suspension, Inject 0.5 mL into the appropriate muscle as directed by prescriber 1 (One) Time for 1 dose. Indications: Vaccination to Prevent Shingles, Disp: 1 each, Rfl: 1   Past Medical History:   Diagnosis Date    Abdominal pain     Alcohol abuse 01/28/2021    Allergic rhinitis due to allergen 10/24/2018    Asthma     Black stool     C. difficile diarrhea     Essential hypertension 10/24/2018    GERD (gastroesophageal reflux disease) 10/24/2018    High cholesterol     History of urinary retention     post op    Hyperlipidemia 10/24/2018    Hypertension     Inguinal hernia     Laryngeal candidiasis     Voice hoarseness         Physical Exam  Vitals reviewed.   Constitutional:       Appearance: Normal appearance. He is well-developed.   HENT:      Right Ear: Tympanic membrane, ear canal and external ear normal.      Left Ear: Tympanic membrane, ear canal and external ear normal.      Mouth/Throat:      Mouth: Mucous membranes are moist.      Pharynx: No pharyngeal swelling, oropharyngeal exudate or posterior oropharyngeal erythema.   Neck:      Thyroid: No thyroid mass, thyromegaly or thyroid tenderness.   Cardiovascular:      Rate and Rhythm: Normal rate and regular rhythm.      Heart sounds: No murmur heard.     No friction rub. No gallop.   Pulmonary:      Effort: Pulmonary effort is normal.      Breath sounds: Normal breath sounds. No wheezing or rhonchi.   Lymphadenopathy:      Cervical: No cervical adenopathy.   Skin:     General: Skin is warm and dry.      Comments: Sebaceous cyst noted to posterior right shoulder, no redness or drainage noted.   Neurological:      Mental Status: He is alert and oriented to person, place, and time.      Cranial Nerves: No cranial nerve deficit.   Psychiatric:         Attention and Perception:  He does not perceive auditory or visual hallucinations.         Mood and Affect: Mood and affect normal.         Behavior: Behavior normal.         Thought Content: Thought content normal. Thought content does not include homicidal or suicidal ideation.         Judgment: Judgment normal.      Comments: No recent hallucinations.              Physical Exam        Result Review :    The following data was reviewed by: RAY Britt on 04/07/2025:        Results  Laboratory Studies  Lyme disease test: Not detected. Rickettsial test: Not detected. Ehrlichia test: Not detected. Vitamin B level: 1900.    Imaging  CT of head showed no abnormalities.      Assessment & Plan  Annual physical exam    Orders:    CBC Auto Differential    Lipid Panel    Basic Metabolic Panel    RPR Qualitative with Reflex to Quant    Chlamydia trachomatis, Neisseria gonorrhoeae, PCR - , Urine, Random Void    Treponema pallidum AB w/Reflex RPR    Essential hypertension      Orders:    lisinopril (PRINIVIL,ZESTRIL) 40 MG tablet; Take 1 tablet by mouth Daily. Indications: High Blood Pressure    amLODIPine (NORVASC) 2.5 MG tablet; Take 1 tablet by mouth Every Night. Indications: High Blood Pressure    Mixed hyperlipidemia       Orders:    lovastatin (MEVACOR) 20 MG tablet; Take 1 tablet by mouth every night at bedtime. Indications: High Amount of Fats in the Blood    Lipid Panel    Gas bloat syndrome    Orders:    pantoprazole (PROTONIX) 40 MG EC tablet; Take 1 tablet by mouth Daily. Indications: Gastroesophageal Reflux Disease    simethicone (Gas-X) 80 MG chewable tablet; Chew 1 tablet Every 6 (Six) Hours As Needed for Flatulence.    Gastroesophageal reflux disease without esophagitis    Orders:    pantoprazole (PROTONIX) 40 MG EC tablet; Take 1 tablet by mouth Daily. Indications: Gastroesophageal Reflux Disease    Low vitamin B12 level    Orders:    CBC Auto Differential    Methylmalonic Acid, Serum    Vitamin B12 &  Folate    Prediabetes    Orders:    Hemoglobin A1c    Sebaceous cyst    Orders:    Ambulatory Referral to General Surgery    Screening for prostate cancer    Orders:    PSA Screen    Need for shingles vaccine    Orders:    Zoster Vac Recomb Adjuvanted (Shingrix) 50 MCG/0.5ML reconstituted suspension; Inject 0.5 mL into the appropriate muscle as directed by prescriber 1 (One) Time for 1 dose. Indications: Vaccination to Prevent Shingles    Moderate persistent asthma, unspecified whether complicated      Assessment & Plan  1. Hypertension.  His blood pressure readings have been inconsistent, alternating between normal and elevated levels. He will be initiated on amlodipine therapy, to be taken at night, while continuing his current lisinopril regimen. A prescription for amlodipine has been sent to [facility] pharmacy. He is advised to maintain a log of his blood pressure readings.    2. Prediabetes.  An A1c test will be conducted today to monitor his prediabetic condition.    3. Anemia.  He has a history of persistent anemia, with slightly low hemoglobin and hematocrit levels over the past few years. Previous iron level checks have yielded normal results.  His B12 level in the hospital was high so he stopped his B12 tablets.  I did advise him that the B12 will be falsely high if he is taking B12 and that he should resume taking his B12 tablets.  I will check vitamin B12, folate and methylmalonic acid today along with CBC and basic panel will be ordered today.    4. Hallucinations.  He has experienced two episodes of hallucinations, the most recent occurring on 03/21/2025, which necessitated a visit to the [facility] Emergency Room. The attending physician suspected delirium tremens (DTs) due to alcohol withdrawal, but he refutes this, attributing the hallucinations to a tick bite. He is advised to consider psychiatric consultation for further evaluation of his hallucinations, but he will think about this and let  me know if he decides to be referred. A syphilis test will be ordered today.    5. Asthma.  He is under the care of a pulmonologist for his asthma, with annual follow-ups. He will be provided with a saline bottle for nasal irrigation.    6. Sebaceous cyst.  He has a sebaceous cyst that exudes a foul-smelling pus when squeezed. He has refrained from manipulating it recently but notes that it has increased in size. A referral to a general surgeon will be made for the removal of the sebaceous cyst. He is advised to seek immediate medical attention if the cyst becomes infected.    7. Health Maintenance.  He is advised to receive the shingles vaccine at [facility] pharmacy. A PSA test will be conducted today for prostate screening. His cholesterol levels will also be checked today. A urine test will be ordered to ensure overall health. He is recommended to have dental appointments twice a year and regular eye exams.    Follow-up  The patient will follow up in 1 month to monitor his blood pressure control.        BMI is within normal parameters. No other follow-up for BMI required.        Diagnosis Plan   1. Annual physical exam  CBC Auto Differential    Lipid Panel    Basic Metabolic Panel    RPR Qualitative with Reflex to Quant    Chlamydia trachomatis, Neisseria gonorrhoeae, PCR - , Urine, Random Void    Treponema pallidum AB w/Reflex RPR      2. Essential hypertension  lisinopril (PRINIVIL,ZESTRIL) 40 MG tablet    amLODIPine (NORVASC) 2.5 MG tablet      3. Mixed hyperlipidemia  lovastatin (MEVACOR) 20 MG tablet    Lipid Panel      4. Gas bloat syndrome  pantoprazole (PROTONIX) 40 MG EC tablet    simethicone (Gas-X) 80 MG chewable tablet      5. Gastroesophageal reflux disease without esophagitis  pantoprazole (PROTONIX) 40 MG EC tablet      6. Low vitamin B12 level  CBC Auto Differential    Methylmalonic Acid, Serum    Vitamin B12 & Folate      7. Prediabetes  Hemoglobin A1c      8. Sebaceous cyst  Ambulatory  Referral to General Surgery      9. Screening for prostate cancer  PSA Screen      10. Need for shingles vaccine  Zoster Vac Recomb Adjuvanted (Shingrix) 50 MCG/0.5ML reconstituted suspension      11. Moderate persistent asthma, unspecified whether complicated              FOLLOW UP  Return in about 1 month (around 5/7/2025) for Recheck uncontrolled HTN.  Patient was given instructions and counseling regarding his condition or for health maintenance advice. Please see specific information pulled into the AVS if appropriate.       CURRENT & DISCONTINUED MEDICATIONS  Current Outpatient Medications   Medication Instructions    albuterol sulfate  (90 Base) MCG/ACT inhaler 2 puffs, Inhalation, Every 4 Hours PRN    amLODIPine (NORVASC) 2.5 mg, Oral, Nightly    cetirizine (ZYRTEC) 10 mg, Oral, Daily    Dulera 200-5 MCG/ACT inhaler 2 puffs, Inhalation, 2 Times Daily    lisinopril (PRINIVIL,ZESTRIL) 40 mg, Oral, Daily    lovastatin (MEVACOR) 20 mg, Oral, Every Night at Bedtime    montelukast (SINGULAIR) 10 mg, Oral, Every Night at Bedtime    pantoprazole (PROTONIX) 40 mg, Oral, Daily    simethicone (GAS-X) 80 mg, Oral, Every 6 Hours PRN    Spiriva HandiHaler 18 MCG per inhalation capsule 1 capsule, Inhalation, Daily    vitamin B-12 (CYANOCOBALAMIN) 1,000 mcg, Oral, Daily    Zoster Vac Recomb Adjuvanted (Shingrix) 50 MCG/0.5ML reconstituted suspension 0.5 mL, Intramuscular, Once       Medications Discontinued During This Encounter   Medication Reason    lisinopril (PRINIVIL,ZESTRIL) 40 MG tablet Reorder    simethicone (Gas-X) 80 MG chewable tablet Reorder    pantoprazole (PROTONIX) 40 MG EC tablet Reorder    lovastatin (MEVACOR) 20 MG tablet Reorder        Parts of this note are electronic transcriptions/translations of spoken language to printed text using the Dragon Dictation system.    Rekha Quevedo, APRN  04/07/25  08:57 EDT

## 2025-04-07 NOTE — ASSESSMENT & PLAN NOTE
Orders:    pantoprazole (PROTONIX) 40 MG EC tablet; Take 1 tablet by mouth Daily. Indications: Gastroesophageal Reflux Disease    simethicone (Gas-X) 80 MG chewable tablet; Chew 1 tablet Every 6 (Six) Hours As Needed for Flatulence.

## 2025-04-07 NOTE — ASSESSMENT & PLAN NOTE
Orders:    lisinopril (PRINIVIL,ZESTRIL) 40 MG tablet; Take 1 tablet by mouth Daily. Indications: High Blood Pressure    amLODIPine (NORVASC) 2.5 MG tablet; Take 1 tablet by mouth Every Night. Indications: High Blood Pressure

## 2025-04-08 ENCOUNTER — RESULTS FOLLOW-UP (OUTPATIENT)
Dept: FAMILY MEDICINE CLINIC | Facility: CLINIC | Age: 60
End: 2025-04-08
Payer: MEDICAID

## 2025-04-08 DIAGNOSIS — R79.89 ABNORMAL CBC: Primary | ICD-10-CM

## 2025-04-08 NOTE — TELEPHONE ENCOUNTER
Patient aware results voiced understanding. He will have lab in 1 week, scheduled 4/15/25 9:00am her in our office.   Patient said he rarely miss any doses cholesterol medication, may  have missed a few. Patient voiced understanding continue B12.

## 2025-04-08 NOTE — PROGRESS NOTES
His platelet count did not feel count is high which could indicate a viral infection.  I would recommend that he get this rechecked next week.  I will put in an order and please have him schedule a lab appointment. Cholesterol levels are above goal, is he taking the lovastatin medication every evening or has he missed doses?  Vitamin B12 level is still above normal but has dropped from 2 weeks ago since he stopped taking B12.  I recommend that he take B12 daily.

## 2025-04-09 ENCOUNTER — READMISSION MANAGEMENT (OUTPATIENT)
Dept: CALL CENTER | Facility: HOSPITAL | Age: 60
End: 2025-04-09
Payer: MEDICAID

## 2025-04-09 NOTE — OUTREACH NOTE
Medical Week 3 Survey      Flowsheet Row Responses   [facility] discharged from? [facility]   Does the patient have one of the following disease processes/diagnoses(primary or secondary)? Other   Week 3 attempt successful? Yes   Call start time 1406   Call end time 1407   Discharge diagnosis Hyponatremia   Person spoke with today (if not patient) and relationship Patient   Does the patient have a primary care provider?  Yes   Comments regarding PCP Seen pcp since DC   Has home health visited the patient within 72 hours of discharge? N/A   Psychosocial issues? No   Did the patient receive a copy of their discharge instructions? Yes   Nursing interventions Reviewed instructions with patient   What is the patient's perception of their health status since discharge? Improving   Is the patient/caregiver able to teach back signs and symptoms related to disease process for when to call PCP? Yes   Is the patient/caregiver able to teach back signs and symptoms related to disease process for when to call 911? Yes   Is the patient/caregiver able to teach back the hierarchy of who to call/visit for symptoms/problems? PCP, Specialist, Home health nurse, Urgent Care, ED, 911 Yes   Week 3 Call Completed? Yes   Graduated Yes   Wrap up additional comments Mae reports doing well, Been in contact with pcp. No concerns or questions noted.   Call end time 1407            Siomara DAVILA - Registered Nurse

## 2025-04-11 LAB — METHYLMALONATE SERPL-SCNC: 88 NMOL/L (ref 0–378)

## 2025-04-15 ENCOUNTER — LAB (OUTPATIENT)
Dept: FAMILY MEDICINE CLINIC | Facility: CLINIC | Age: 60
End: 2025-04-15
Payer: MEDICAID

## 2025-04-15 DIAGNOSIS — R79.89 ABNORMAL CBC: ICD-10-CM

## 2025-04-15 LAB
BASOPHILS # BLD AUTO: 0.08 10*3/MM3 (ref 0–0.2)
BASOPHILS NFR BLD AUTO: 1.3 % (ref 0–1.5)
DEPRECATED RDW RBC AUTO: 44.3 FL (ref 37–54)
EOSINOPHIL # BLD AUTO: 0.6 10*3/MM3 (ref 0–0.4)
EOSINOPHIL NFR BLD AUTO: 10.1 % (ref 0.3–6.2)
ERYTHROCYTE [DISTWIDTH] IN BLOOD BY AUTOMATED COUNT: 13.1 % (ref 12.3–15.4)
HCT VFR BLD AUTO: 35 % (ref 37.5–51)
HGB BLD-MCNC: 12.3 G/DL (ref 13–17.7)
IMM GRANULOCYTES # BLD AUTO: 0.03 10*3/MM3 (ref 0–0.05)
IMM GRANULOCYTES NFR BLD AUTO: 0.5 % (ref 0–0.5)
LYMPHOCYTES # BLD AUTO: 1.96 10*3/MM3 (ref 0.7–3.1)
LYMPHOCYTES NFR BLD AUTO: 33.1 % (ref 19.6–45.3)
MCH RBC QN AUTO: 32.2 PG (ref 26.6–33)
MCHC RBC AUTO-ENTMCNC: 35.1 G/DL (ref 31.5–35.7)
MCV RBC AUTO: 91.6 FL (ref 79–97)
MONOCYTES # BLD AUTO: 0.63 10*3/MM3 (ref 0.1–0.9)
MONOCYTES NFR BLD AUTO: 10.6 % (ref 5–12)
NEUTROPHILS NFR BLD AUTO: 2.63 10*3/MM3 (ref 1.7–7)
NEUTROPHILS NFR BLD AUTO: 44.4 % (ref 42.7–76)
NRBC BLD AUTO-RTO: 0 /100 WBC (ref 0–0.2)
PLATELET # BLD AUTO: 409 10*3/MM3 (ref 140–450)
PMV BLD AUTO: 9.1 FL (ref 6–12)
RBC # BLD AUTO: 3.82 10*6/MM3 (ref 4.14–5.8)
WBC NRBC COR # BLD AUTO: 5.93 10*3/MM3 (ref 3.4–10.8)

## 2025-04-15 PROCEDURE — 85025 COMPLETE CBC W/AUTO DIFF WBC: CPT | Performed by: NURSE PRACTITIONER

## 2025-04-15 PROCEDURE — 36415 COLL VENOUS BLD VENIPUNCTURE: CPT | Performed by: NURSE PRACTITIONER

## 2025-04-16 ENCOUNTER — TELEPHONE (OUTPATIENT)
Dept: FAMILY MEDICINE CLINIC | Facility: CLINIC | Age: 60
End: 2025-04-16

## 2025-04-16 ENCOUNTER — CLINICAL SUPPORT (OUTPATIENT)
Dept: FAMILY MEDICINE CLINIC | Facility: CLINIC | Age: 60
End: 2025-04-16
Payer: MEDICAID

## 2025-04-16 ENCOUNTER — RESULTS FOLLOW-UP (OUTPATIENT)
Dept: FAMILY MEDICINE CLINIC | Facility: CLINIC | Age: 60
End: 2025-04-16
Payer: MEDICAID

## 2025-04-16 ENCOUNTER — OFFICE VISIT (OUTPATIENT)
Dept: ORTHOPEDIC SURGERY | Facility: CLINIC | Age: 60
End: 2025-04-16
Payer: MEDICAID

## 2025-04-16 ENCOUNTER — PREP FOR SURGERY (OUTPATIENT)
Dept: OTHER | Facility: HOSPITAL | Age: 60
End: 2025-04-16
Payer: MEDICAID

## 2025-04-16 VITALS
BODY MASS INDEX: 22.43 KG/M2 | SYSTOLIC BLOOD PRESSURE: 121 MMHG | HEART RATE: 92 BPM | DIASTOLIC BLOOD PRESSURE: 79 MMHG | OXYGEN SATURATION: 96 % | WEIGHT: 148 LBS | HEIGHT: 68 IN

## 2025-04-16 VITALS — SYSTOLIC BLOOD PRESSURE: 161 MMHG | HEART RATE: 73 BPM | DIASTOLIC BLOOD PRESSURE: 98 MMHG

## 2025-04-16 DIAGNOSIS — G56.23 CUBITAL TUNNEL SYNDROME, BILATERAL: ICD-10-CM

## 2025-04-16 DIAGNOSIS — I10 ESSENTIAL HYPERTENSION: Primary | ICD-10-CM

## 2025-04-16 DIAGNOSIS — G56.03 BILATERAL CARPAL TUNNEL SYNDROME: Primary | ICD-10-CM

## 2025-04-16 DIAGNOSIS — R20.0 NUMBNESS AND TINGLING IN BOTH HANDS: Primary | ICD-10-CM

## 2025-04-16 DIAGNOSIS — R20.2 NUMBNESS AND TINGLING IN BOTH HANDS: Primary | ICD-10-CM

## 2025-04-16 NOTE — PROGRESS NOTES
"Chief Complaint  Follow-up, Pain, and Numbness of the Left Wrist and Follow-up and Pain of the Right Wrist    Subjective          Bentley Martínez presents to [facility] for   History of Present Illness    Bentley presents today for evaluation of his bilateral hands.  He has bilateral hand numbness that has been going on for 1 year.  The left is worse than the right.  He is left-hand dominant.  He works as a .  He had an EMG.  This revealed severe left carpal tunnel, moderate bilateral cubital tunnel, and mild to moderate right carpal tunnel.  He has baseline paresthesias that are worse with activity.  He has functional limitations.    No Known Allergies     Social History     Socioeconomic History    Marital status: Single   Tobacco Use    Smoking status: Former     Current packs/day: 0.00     Average packs/day: 1.5 packs/day for 12.0 years (18.0 ttl pk-yrs)     Types: Cigarettes     Start date:      Quit date:      Years since quittin.3     Passive exposure: Never    Smokeless tobacco: Never   Vaping Use    Vaping status: Never Used   Substance and Sexual Activity    Alcohol use: Yes     Alcohol/week: 6.0 - 8.0 standard drinks of alcohol     Types: 6 - 8 Cans of beer per week     Comment: Current every day    Drug use: Yes     Types: Marijuana     Comment: USED TWO DAYS AGO    Sexual activity: Defer        I reviewed the patient's chief complaint, history of present illness, review of systems, past medical history, surgical history, family history, social history, medications, and allergy list.     REVIEW OF SYSTEMS    Constitutional: Denies fevers, chills, weight loss  Cardiovascular: Denies chest pain, shortness of breath  Skin: Denies rashes, acute skin changes  Neurologic: Denies headache, loss of consciousness  MSK: Bilateral paresthesias      Objective   Vital Signs:   /79   Pulse 92   Ht 172.7 cm (68\")   Wt 67.1 kg (148 lb)   SpO2 96%   BMI 22.50 kg/m² "     Body mass index is 22.5 kg/m².    Physical Exam    General: Alert. No acute distress.   Bilateral upper extremities: Mild muscle atrophy.  Arthritic deformities in the hands.  Full elbow range of motion.  Stiffness with finger motion secondary to arthritic deformity.  Intact palmar abduction and thumb opposition.  Paresthesias in the median and ulnar nerve distributions bilaterally.  Positive Phalen's compression over the carpal tunnel bilaterally.  Positive Tinel's over the cubital tunnel bilaterally.  Palpable radial pulses.    Procedures    Imaging Results (Most Recent)       None                     Assessment and Plan        EMG & Nerve Conduction Test  Result Date: 4/3/2025  Narrative: See attached EMG/NCS report. Electronically signed by Ernesto Bagley PT, 04/03/25, 3:49 PM EDT.     XR Chest 1 View  Result Date: 3/21/2025  Narrative: XR CHEST 1 VW Date of Exam: 3/21/2025 7:14 PM EDT Indication: Weak/Dizzy/AMS triage protocol Comparison: Chest radiograph 5/29/2022. Chest CT 12/15/2022 Findings: Mediastinum: Cardiac silhouette appears unchanged and normal in size Lungs: Mildly increased prominence of central pulmonary vasculature/apparent perihilar interstitial opacities. No focal consolidation appreciated. Pleura: No pleural effusion or pneumothorax. Bones and soft tissues: No acute, displaced fracture seen.     Impression: Impression: Mildly increased prominence of central pulmonary vasculature/apparent perihilar interstitial opacities, which may be related to differences in technique, mild pulmonary edema, or viral/atypical infection. Electronically Signed: Isiah Lopez  3/21/2025 7:45 PM EDT  Workstation ID: [id number]    CT Head Without Contrast  Result Date: 3/21/2025  Narrative: CT HEAD WO CONTRAST Date of Exam: 3/21/2025 7:14 PM EDT Indication: AMS. Comparison: Head CT 12/4/2012 Technique: Axial CT images were obtained of the head without contrast administration.  Reconstructed coronal and sagittal  images were also obtained. Automated exposure control and iterative construction methods were used. Findings: No acute intracranial hemorrhage.Intact appearing gray-white differentiation.No extra-axial fluid collection.No significant mass effect. No hydrocephalus. Mild generalized parenchymal volume loss. Scattered areas of periventricular and subcortical white matter hypoattenuation, nonspecific, perhaps from small vessel ischemic/hypertensive changes in a patient of this age.There are intracranial atherosclerotic calcifications. Paranasal sinus mucosal thickening with bubbly secretions in the right sphenoid sinus which can be correlated for symptoms of recent sinusitis. Mastoid air cells are essentially clear.Included globes and orbits appear unremarkable by CT. No acute or aggressive appearing bony or extracranial soft tissue process.     Impression: Impression: No acute intracranial findings. Electronically Signed: Isiah Lopez  3/21/2025 7:21 PM EDT  Workstation ID: [id number]       Diagnoses and all orders for this visit:    1. Bilateral carpal tunnel syndrome (Primary)    2. Cubital tunnel syndrome, bilateral        We reviewed his EMG and discussed treatment options.  We discussed the risk, benefits, indications, alternatives to a left carpal tunnel release and left cubital tunnel release.  We discussed primary benefits of surgery including relieving his nerve compression.  We discussed surgeries including bleeding, infection, damage to nerves and blood vessels, persistent pain, recurrent numbness, stiffness, functional limitation, his cosmetic deformity, anesthesia risk could mortality, DVT/PE, need for additional procedures.  He elected to proceed with surgical management.      Call or return if worsening symptoms.    Scribed for Tenzin Parsons MD by Boston Youssef  04/16/2025   12:58 EDT         Follow Up       2-week postop    Patient was given instructions and counseling regarding his condition or for  health maintenance advice. Please see specific information pulled into the AVS if appropriate.       I have personally performed the services described in this document as scribed by the above individual and it is both accurate and complete. Tenzin Parsons MD 04/16/25 13:18 EDT

## 2025-04-16 NOTE — SIGNIFICANT NOTE
Pt states has been unemployed and is needing help finding a job.  States has an old felony charge that has prevented him from getting jobs in the past.      Gave patient [facility] Resource Guide, specifically highlighting employment assistance and Good Will for assistance with food, clothing, housing, employment skills, and expungement options.  Pt states he will call to determine how they can help.    Pt also stated difficulty with obtaining food.  A food box was given to the patient.  Food pantry information was also highlighted in the book and patient was notified of the food blessing box outside of the clinic.

## 2025-04-16 NOTE — PROGRESS NOTES
Pt presented for [facility] encounter 3.  Education provided through [facility] Curriculum focusing on Lowering Cholesterol.    Pt brought logs to visit.  Logs shared with provider.  BP was 161/98 Pulse 73 with office machine.  Pt logs indicate that BP has been lower using home BP cuff.      Pt instructed to call PCP if symptomatic with any symptoms with BP readings.  Patient verbalizes understanding.    Pt shared he's unemployed and has trouble getting needed food.  A food box was given to the patient, along with [facility] Resource guide on information for employment assistance, food pantry locations, etc.  Pt also notified of the blessing box outside the office that he is welcome to use, when needed.      Pt referred to  for follow-up needs.    4th Encounter will focus on Stress Management and is scheduled for May 30th at 1130.

## 2025-04-16 NOTE — PROGRESS NOTES
Platelet count did improve, however, hemoglobin hematocrit has decreased again.  Advised him to resume taking his vitamin B12 tablet.

## 2025-04-17 DIAGNOSIS — M79.641 BILATERAL HAND PAIN: ICD-10-CM

## 2025-04-17 DIAGNOSIS — I10 ESSENTIAL HYPERTENSION: ICD-10-CM

## 2025-04-17 DIAGNOSIS — M79.642 BILATERAL HAND PAIN: ICD-10-CM

## 2025-04-18 RX ORDER — NAPROXEN 500 MG/1
500 TABLET ORAL 2 TIMES DAILY WITH MEALS
Qty: 60 TABLET | Refills: 5 | OUTPATIENT
Start: 2025-04-18

## 2025-04-18 RX ORDER — HYDROCHLOROTHIAZIDE 25 MG/1
25 TABLET ORAL DAILY
Qty: 30 TABLET | Refills: 5 | OUTPATIENT
Start: 2025-04-18

## 2025-04-23 ENCOUNTER — HOSPITAL ENCOUNTER (EMERGENCY)
Facility: HOSPITAL | Age: 60
Discharge: HOME | End: 2025-04-23
Attending: EMERGENCY MEDICINE
Payer: MEDICAID

## 2025-04-23 ENCOUNTER — APPOINTMENT (OUTPATIENT)
Dept: GENERAL RADIOLOGY | Facility: HOSPITAL | Age: 60
End: 2025-04-23
Payer: MEDICAID

## 2025-04-23 VITALS
DIASTOLIC BLOOD PRESSURE: 91 MMHG | TEMPERATURE: 98.7 F | OXYGEN SATURATION: 94 % | RESPIRATION RATE: 18 BRPM | HEART RATE: 63 BPM | HEIGHT: 68 IN | WEIGHT: 149 LBS | BODY MASS INDEX: 22.58 KG/M2 | SYSTOLIC BLOOD PRESSURE: 156 MMHG

## 2025-04-23 DIAGNOSIS — M54.12 CERVICAL RADICULOPATHY: Primary | ICD-10-CM

## 2025-04-23 LAB
ALBUMIN SERPL-MCNC: 4.6 G/DL (ref 3.5–5.2)
ALBUMIN/GLOB SERPL: 1.8 G/DL
ALP SERPL-CCNC: 68 U/L (ref 39–117)
ALT SERPL W P-5'-P-CCNC: 19 U/L (ref 1–41)
ANION GAP SERPL CALCULATED.3IONS-SCNC: 11.6 MMOL/L (ref 5–15)
AST SERPL-CCNC: 26 U/L (ref 1–40)
BASOPHILS # BLD AUTO: 0.06 10*3/MM3 (ref 0–0.2)
BASOPHILS NFR BLD AUTO: 0.9 % (ref 0–1.5)
BILIRUB SERPL-MCNC: 0.3 MG/DL (ref 0–1.2)
BUN SERPL-MCNC: 12 MG/DL (ref 8–23)
BUN/CREAT SERPL: 13.6 (ref 7–25)
CALCIUM SPEC-SCNC: 9 MG/DL (ref 8.6–10.5)
CHLORIDE SERPL-SCNC: 101 MMOL/L (ref 98–107)
CO2 SERPL-SCNC: 24.4 MMOL/L (ref 22–29)
CREAT SERPL-MCNC: 0.88 MG/DL (ref 0.76–1.27)
DEPRECATED RDW RBC AUTO: 48.4 FL (ref 37–54)
EGFRCR SERPLBLD CKD-EPI 2021: 98.4 ML/MIN/1.73
EOSINOPHIL # BLD AUTO: 0.76 10*3/MM3 (ref 0–0.4)
EOSINOPHIL NFR BLD AUTO: 11.2 % (ref 0.3–6.2)
ERYTHROCYTE [DISTWIDTH] IN BLOOD BY AUTOMATED COUNT: 14 % (ref 12.3–15.4)
GLOBULIN UR ELPH-MCNC: 2.6 GM/DL
GLUCOSE SERPL-MCNC: 127 MG/DL (ref 65–99)
HCT VFR BLD AUTO: 36.8 % (ref 37.5–51)
HGB BLD-MCNC: 12.2 G/DL (ref 13–17.7)
HOLD SPECIMEN: NORMAL
HOLD SPECIMEN: NORMAL
IMM GRANULOCYTES # BLD AUTO: 0.03 10*3/MM3 (ref 0–0.05)
IMM GRANULOCYTES NFR BLD AUTO: 0.4 % (ref 0–0.5)
LIPASE SERPL-CCNC: 42 U/L (ref 13–60)
LYMPHOCYTES # BLD AUTO: 1.92 10*3/MM3 (ref 0.7–3.1)
LYMPHOCYTES NFR BLD AUTO: 28.4 % (ref 19.6–45.3)
MAGNESIUM SERPL-MCNC: 2.1 MG/DL (ref 1.6–2.4)
MCH RBC QN AUTO: 31.6 PG (ref 26.6–33)
MCHC RBC AUTO-ENTMCNC: 33.2 G/DL (ref 31.5–35.7)
MCV RBC AUTO: 95.3 FL (ref 79–97)
MONOCYTES # BLD AUTO: 0.7 10*3/MM3 (ref 0.1–0.9)
MONOCYTES NFR BLD AUTO: 10.4 % (ref 5–12)
NEUTROPHILS NFR BLD AUTO: 3.29 10*3/MM3 (ref 1.7–7)
NEUTROPHILS NFR BLD AUTO: 48.7 % (ref 42.7–76)
NRBC BLD AUTO-RTO: 0 /100 WBC (ref 0–0.2)
NT-PROBNP SERPL-MCNC: 70.6 PG/ML (ref 0–900)
PLATELET # BLD AUTO: 365 10*3/MM3 (ref 140–450)
PMV BLD AUTO: 8.6 FL (ref 6–12)
POTASSIUM SERPL-SCNC: 4.1 MMOL/L (ref 3.5–5.2)
PROT SERPL-MCNC: 7.2 G/DL (ref 6–8.5)
QT INTERVAL: 392 MS
QTC INTERVAL: 417 MS
RBC # BLD AUTO: 3.86 10*6/MM3 (ref 4.14–5.8)
SODIUM SERPL-SCNC: 137 MMOL/L (ref 136–145)
TROPONIN T SERPL HS-MCNC: 14 NG/L
WBC NRBC COR # BLD AUTO: 6.76 10*3/MM3 (ref 3.4–10.8)
WHOLE BLOOD HOLD COAG: NORMAL
WHOLE BLOOD HOLD SPECIMEN: NORMAL

## 2025-04-23 PROCEDURE — 83880 ASSAY OF NATRIURETIC PEPTIDE: CPT | Performed by: EMERGENCY MEDICINE

## 2025-04-23 PROCEDURE — 80053 COMPREHEN METABOLIC PANEL: CPT | Performed by: EMERGENCY MEDICINE

## 2025-04-23 PROCEDURE — 71045 X-RAY EXAM CHEST 1 VIEW: CPT

## 2025-04-23 PROCEDURE — 84484 ASSAY OF TROPONIN QUANT: CPT | Performed by: EMERGENCY MEDICINE

## 2025-04-23 PROCEDURE — 99284 EMERGENCY DEPT VISIT MOD MDM: CPT

## 2025-04-23 PROCEDURE — 83735 ASSAY OF MAGNESIUM: CPT | Performed by: EMERGENCY MEDICINE

## 2025-04-23 PROCEDURE — 93005 ELECTROCARDIOGRAM TRACING: CPT

## 2025-04-23 PROCEDURE — 93005 ELECTROCARDIOGRAM TRACING: CPT | Performed by: EMERGENCY MEDICINE

## 2025-04-23 PROCEDURE — 85025 COMPLETE CBC W/AUTO DIFF WBC: CPT | Performed by: EMERGENCY MEDICINE

## 2025-04-23 PROCEDURE — 83690 ASSAY OF LIPASE: CPT | Performed by: EMERGENCY MEDICINE

## 2025-04-23 RX ORDER — SODIUM CHLORIDE 0.9 % (FLUSH) 0.9 %
10 SYRINGE (ML) INJECTION AS NEEDED
Status: DISCONTINUED | OUTPATIENT
Start: 2025-04-23 | End: 2025-04-23 | Stop reason: HOSPADM

## 2025-04-23 RX ORDER — ASPIRIN 81 MG/1
324 TABLET, CHEWABLE ORAL ONCE
Status: DISCONTINUED | OUTPATIENT
Start: 2025-04-23 | End: 2025-04-23

## 2025-04-23 RX ORDER — PREDNISONE 50 MG/1
50 TABLET ORAL DAILY
Qty: 5 TABLET | Refills: 0 | Status: SHIPPED | OUTPATIENT
Start: 2025-04-23 | End: 2025-04-28

## 2025-04-23 NOTE — DISCHARGE INSTRUCTIONS
Follow-up your family doctor.  You should consider referral to a spine specialist and possibly MRI of your cervical spine.  Return to the ER if you lose strength in your affected left arm.  Your cardiac testing today is reassuring but certainly return to the ER if you have chest discomfort/pressure or difficulty breathing when you exert yourself that improves with rest.

## 2025-04-23 NOTE — ED PROVIDER NOTES
"Time: 11:57 AM EDT  Date of encounter:  4/23/2025  Independent Historian/Clinical History and Information was obtained by:   Patient    History is limited by: N/A    Chief Complaint: Neck pain      History of Present Illness:  Patient is a 59 y.o. year old male who presents to the emergency department for evaluation of neck pain.  Patient states he has had pain since about 1 hour after waking up.  He typically is quite active throughout the day and denies ever having exertional chest discomfort or exertional dyspnea.  His neck pain today is left anterior lateral neck and is worse when he turns his head.  He notes there is some numbness and tingling to his 1st, 2nd and 3rd fingers.  No obvious weakness.  No recent neck injury.      Patient Care Team  Primary Care Provider: Rekha Quevedo APRN    Past Medical History:     No Known Allergies  Past Medical History:   Diagnosis Date    Abdominal pain     Alcohol abuse 01/28/2021    Allergic rhinitis due to allergen 10/24/2018    Asthma     Black stool     C. difficile diarrhea     Essential hypertension 10/24/2018    GERD (gastroesophageal reflux disease) 10/24/2018    High cholesterol     History of urinary retention     post op    Hyperlipidemia 10/24/2018    Hypertension     Inguinal hernia     Laryngeal candidiasis     Voice hoarseness      Past Surgical History:   Procedure Laterality Date    COLONOSCOPY N/A 10/30/2023    Procedure: COLONOSCOPY with POLYPECTOMY;  Surgeon: Orlando Leon MD;  Location: [facility] ENDOSCOPY;  Service: General;  Laterality: N/A;  COLON POLYP, HEMORRHOIDS    CYST REMOVAL      Back of neck    ENDOSCOPY N/A 10/30/2023    Procedure: ESOPHAGOGASTRODUODENOSCOPY WITH BIOPSIES;  Surgeon: Orlando Leon MD;  Location: [facility] ENDOSCOPY;  Service: General;  Laterality: N/A;  NORMAL    HERNIA REPAIR      \"30 years ago\"    INGUINAL HERNIA REPAIR Right 8/11/2021    Procedure: INGUINAL HERNIA REPAIR LAPAROSCOPIC WITH DAVINCI ROBOT;  Surgeon: " Orlando Leon MD;  Location: [facility] MAIN OR;  Service: Robotics - DaVinci;  Laterality: Right;     Family History   Problem Relation Age of Onset    Heart disease Mother     Heart disease Father     Heart disease Brother        Home Medications:  Prior to Admission medications    Medication Sig Start Date End Date Taking? Authorizing Provider   albuterol sulfate  (90 Base) MCG/ACT inhaler Inhale 2 puffs Every 4 (Four) Hours As Needed for Wheezing or Shortness of Air. 8/20/24   Emelina Bautista APRN   amLODIPine (NORVASC) 2.5 MG tablet Take 1 tablet by mouth Every Night. Indications: High Blood Pressure 4/7/25   Rekha Quevedo APRN   cetirizine (zyrTEC) 10 MG tablet Take 1 tablet by mouth Daily. 8/20/24   Emelina Bautista APRN   Dulera 200-5 MCG/ACT inhaler Inhale 2 puffs 2 (Two) Times a Day. 8/20/24   Emelina Bautista APRN   lisinopril (PRINIVIL,ZESTRIL) 40 MG tablet Take 1 tablet by mouth Daily. Indications: High Blood Pressure 4/7/25   Rekha Quevedo APRN   lovastatin (MEVACOR) 20 MG tablet Take 1 tablet by mouth every night at bedtime. Indications: High Amount of Fats in the Blood 4/7/25   Rekha Quevedo APRN   montelukast (SINGULAIR) 10 MG tablet Take 1 tablet by mouth every night at bedtime. 8/20/24   Emelina Bautista APRN   pantoprazole (PROTONIX) 40 MG EC tablet Take 1 tablet by mouth Daily. Indications: Gastroesophageal Reflux Disease 4/7/25   Rekha Quevedo APRN   simethicone (Gas-X) 80 MG chewable tablet Chew 1 tablet Every 6 (Six) Hours As Needed for Flatulence. 4/7/25   Rekha Quevedo APRN   Spiriva HandiHaler 18 MCG per inhalation capsule Place 1 capsule into inhaler and inhale Daily. 8/20/24   Emelina Batuista APRN   vitamin B-12 (CYANOCOBALAMIN) 1000 MCG tablet Take 1 tablet by mouth Daily. Indications: Inadequate Vitamin B12  Patient taking differently: Take 1 tablet by mouth Daily. 10/7/24   Rekha Quevedo APRN        Social History:   Social History  "    Tobacco Use    Smoking status: Former     Current packs/day: 0.00     Average packs/day: 1.5 packs/day for 12.0 years (18.0 ttl pk-yrs)     Types: Cigarettes     Start date:      Quit date:      Years since quittin.3     Passive exposure: Never    Smokeless tobacco: Never   Vaping Use    Vaping status: Never Used   Substance Use Topics    Alcohol use: Yes     Alcohol/week: 6.0 - 8.0 standard drinks of alcohol     Types: 6 - 8 Cans of beer per week     Comment: Current every day    Drug use: Yes     Types: Marijuana     Comment: USED TWO DAYS AGO         Review of Systems:  Review of Systems   Constitutional:  Negative for chills and fever.   HENT:  Negative for congestion, rhinorrhea and sore throat.    Eyes:  Negative for photophobia.   Respiratory:  Negative for apnea, cough, chest tightness and shortness of breath.    Cardiovascular:  Negative for chest pain and palpitations.   Gastrointestinal:  Negative for abdominal pain, diarrhea, nausea and vomiting.   Endocrine: Negative.    Genitourinary:  Negative for difficulty urinating and dysuria.   Musculoskeletal:  Positive for neck pain. Negative for back pain, joint swelling and myalgias.   Skin:  Negative for color change and wound.   Allergic/Immunologic: Negative.    Neurological:  Positive for numbness. Negative for seizures and headaches.   Psychiatric/Behavioral: Negative.     All other systems reviewed and are negative.       Physical Exam:  /91   Pulse 63   Temp 98.7 °F (37.1 °C)   Resp 18   Ht 172.7 cm (68\")   Wt 67.6 kg (149 lb)   SpO2 94%   BMI 22.66 kg/m²     Physical Exam  Vitals and nursing note reviewed.   Constitutional:       General: He is awake.      Appearance: Normal appearance. He is well-developed.   HENT:      Head: Normocephalic and atraumatic.      Nose: Nose normal.      Mouth/Throat:      Mouth: Mucous membranes are moist.   Eyes:      Extraocular Movements: Extraocular movements intact.      Pupils: Pupils " are equal, round, and reactive to light.   Cardiovascular:      Rate and Rhythm: Normal rate and regular rhythm.      Heart sounds: Normal heart sounds.   Pulmonary:      Effort: Pulmonary effort is normal. No respiratory distress.      Breath sounds: Normal breath sounds. No wheezing, rhonchi or rales.   Abdominal:      General: Bowel sounds are normal.      Palpations: Abdomen is soft.      Tenderness: There is no abdominal tenderness. There is no guarding or rebound.      Comments: No rigidity   Musculoskeletal:         General: No tenderness. Normal range of motion.      Cervical back: Normal range of motion and neck supple.   Skin:     General: Skin is warm and dry.      Coloration: Skin is not jaundiced.   Neurological:      General: No focal deficit present.      Mental Status: He is alert. Mental status is at baseline.   Psychiatric:         Mood and Affect: Mood normal.                    Medical Decision Making:      Comorbidities that affect care:    Hypertension, GERD, alcoholism    External Notes reviewed:    Previous Clinic Note: Orthopedics office visit 4/16/2025.  Description: Bilateral carpal tunnel syndrome.      The following orders were placed and all results were independently analyzed by me:  Orders Placed This Encounter   Procedures    XR Chest 1 View    [place] Draw    High Sensitivity Troponin T    Comprehensive Metabolic Panel    Lipase    BNP    Magnesium    CBC Auto Differential    NPO Diet NPO Type: Strict NPO    Undress & Gown    Continuous Pulse Oximetry    Oxygen Therapy- Nasal Cannula; Titrate 1-6 LPM Per SpO2; 90 - 95%    ECG 12 Lead ED Triage Standing Order; Chest Pain    ECG 12 Lead ED Triage Standing Order; Chest Pain    Insert Peripheral IV    CBC & Differential    Green Top (Gel)    Lavender Top    Gold Top - SST    Light Blue Top       Medications Given in the Emergency Department:  Medications   sodium chloride 0.9 % flush 10 mL (has no administration in time range)        ED  Course:    ED Course as of 04/23/25 1223   Wed Apr 23, 2025   1157 I have personally interpreted the EKG today and it shows no evidence of any acute ischemia or heart arrhythmia.  No change from 3/21/2025 [RP]      ED Course User Index  [RP] Sami Bran MD       Labs:    Lab Results (last 24 hours)       Procedure Component Value Units Date/Time    POC Rapid Strep A [[id number]]  (Normal) Collected: 04/23/25 1014    Specimen: Swab Updated: 04/23/25 1014     Rapid Strep A Screen Negative     Internal Control Passed     Lot Number [id number]     Expiration Date 4/24/26    High Sensitivity Troponin T [[id number]]  (Normal) Collected: 04/23/25 1054    Specimen: Blood Updated: 04/23/25 1118     HS Troponin T 14 ng/L     Narrative:      High Sensitive Troponin T Reference Range:  <14.0 ng/L- Negative Female for AMI  <22.0 ng/L- Negative Male for AMI  >=14 - Abnormal Female indicating possible myocardial injury.  >=22 - Abnormal Male indicating possible myocardial injury.   Clinicians would have to utilize clinical acumen, EKG, Troponin, and serial changes to determine if it is an Acute Myocardial Infarction or myocardial injury due to an underlying chronic condition.         CBC & Differential [[id number]]  (Abnormal) Collected: 04/23/25 1054    Specimen: Blood Updated: 04/23/25 1058    Narrative:      The following orders were created for panel order CBC & Differential.  Procedure                               Abnormality         Status                     ---------                               -----------         ------                     CBC Auto Differential[[id number]]        Abnormal            Final result                 Please view results for these tests on the individual orders.    Comprehensive Metabolic Panel [[id number]]  (Abnormal) Collected: 04/23/25 1054    Specimen: Blood Updated: 04/23/25 1118     Glucose 127 mg/dL      BUN 12 mg/dL      Creatinine 0.88 mg/dL      Sodium 137 mmol/L      Potassium 4.1  mmol/L      Chloride 101 mmol/L      CO2 24.4 mmol/L      Calcium 9.0 mg/dL      Total Protein 7.2 g/dL      Albumin 4.6 g/dL      ALT (SGPT) 19 U/L      AST (SGOT) 26 U/L      Alkaline Phosphatase 68 U/L      Total Bilirubin 0.3 mg/dL      Globulin 2.6 gm/dL      A/G Ratio 1.8 g/dL      BUN/Creatinine Ratio 13.6     Anion Gap 11.6 mmol/L      eGFR 98.4 mL/min/1.73     Narrative:      GFR Categories in Chronic Kidney Disease (CKD)      GFR Category          GFR (mL/min/1.73)    Interpretation  G1                     90 or greater         Normal or high (1)  G2                      60-89                Mild decrease (1)  G3a                   45-59                Mild to moderate decrease  G3b                   30-44                Moderate to severe decrease  G4                    15-29                Severe decrease  G5                    14 or less           Kidney failure          (1)In the absence of evidence of kidney disease, neither GFR category G1 or G2 fulfill the criteria for CKD.    eGFR calculation 2021 CKD-EPI creatinine equation, which does not include race as a factor    Lipase [[id number]]  (Normal) Collected: 04/23/25 1054    Specimen: Blood Updated: 04/23/25 1118     Lipase 42 U/L     BNP [[id number]]  (Normal) Collected: 04/23/25 1054    Specimen: Blood Updated: 04/23/25 1115     proBNP 70.6 pg/mL     Narrative:      This assay is used as an aid in the diagnosis of individuals suspected of having heart failure. It can be used as an aid in the diagnosis of acute decompensated heart failure (ADHF) in patients presenting with signs and symptoms of ADHF to the emergency department (ED). In addition, NT-proBNP of <300 pg/mL indicates ADHF is not likely.    Age Range Result Interpretation  NT-proBNP Concentration (pg/mL:      <50             Positive            >450                   Gray                 300-450                    Negative             <300    50-75           Positive            >900                   Schulz                300-900                  Negative            <300      >75             Positive            >1800                  Gray                300-1800                  Negative            <300    Magnesium [[id number]]  (Normal) Collected: 04/23/25 1054    Specimen: Blood Updated: 04/23/25 1118     Magnesium 2.1 mg/dL     CBC Auto Differential [[id number]]  (Abnormal) Collected: 04/23/25 1054    Specimen: Blood Updated: 04/23/25 1058     WBC 6.76 10*3/mm3      RBC 3.86 10*6/mm3      Hemoglobin 12.2 g/dL      Hematocrit 36.8 %      MCV 95.3 fL      MCH 31.6 pg      MCHC 33.2 g/dL      RDW 14.0 %      RDW-SD 48.4 fl      MPV 8.6 fL      Platelets 365 10*3/mm3      Neutrophil % 48.7 %      Lymphocyte % 28.4 %      Monocyte % 10.4 %      Eosinophil % 11.2 %      Basophil % 0.9 %      Immature Grans % 0.4 %      Neutrophils, Absolute 3.29 10*3/mm3      Lymphocytes, Absolute 1.92 10*3/mm3      Monocytes, Absolute 0.70 10*3/mm3      Eosinophils, Absolute 0.76 10*3/mm3      Basophils, Absolute 0.06 10*3/mm3      Immature Grans, Absolute 0.03 10*3/mm3      nRBC 0.0 /100 WBC              Imaging:    XR Chest 1 View  Result Date: 4/23/2025  XR CHEST 1 VW Date of Exam: 4/23/2025 10:55 AM EDT Indication: Chest Pain Triage Protocol Comparison: 3/21/2025 Findings: Minor linear density in the right midlung suggests subsegmental atelectasis. No other focal pulmonary consolidations. Pulmonary vascularity appears within normal limits. Heart size and mediastinal contour appear within normal limits. No pneumothorax or pleural fluid identified.     Impression: Minor atelectasis in the right midlung. Electronically Signed: Mat Kim MD  4/23/2025 11:30 AM EDT  Workstation ID: [id number]        Differential Diagnosis and Discussion:    Neck Pain: The patient presents with neck pain. My differential diagnosis includes but is not limited to acute spinal epidural abscess, acute spinal epidural bleed, meningitis,  musculoskeletal neck pain, spinal fracture, and osteoarthritis.     PROCEDURES:    Labs were collected in the emergency department and all labs were reviewed and interpreted by me.  X-ray were performed in the emergency department and all X-ray impressions were independently interpreted by me.  An EKG was performed and the EKG was interpreted by me.          ECG 12 Lead ED Triage Standing Order; Chest Pain   Preliminary Result   HEART RATE=68  bpm   RR Uwzehefg=412  ms   MN Lkjrozpt=588  ms   P Horizontal Axis=13  deg   P Front Axis=74  deg   QRSD Qnrbjxpn=140  ms   QT Wdbblefx=642  ms   NPjF=396  ms   QRS Axis=26  deg   T Wave Axis=23  deg   - OTHERWISE NORMAL ECG -   Sinus rhythm   Minimal ST elevation, anterior leads   Date and Time of Study:2025-04-23 10:34:45          Procedures    MDM     Amount and/or Complexity of Data Reviewed  Decide to obtain previous medical records or to obtain history from someone other than the patient: yes                       Patient Care Considerations:    PERC: I used the PERC score to risk stratify the patient for PE and a CT of the chest was considered but ultimately not indicated in today's visit.      Consultants/Shared Management Plan:    None    Social Determinants of Health:    Patient is independent, reliable, and has access to care.       Disposition and Care Coordination:    Discharged: I considered escalation of care by admitting this patient to the hospital, however patient is not having chest pain.  His neck pain is reproduced with certain movements and fits a classic picture of cervical radiculopathy.  He has no weakness.      I have explained the patient´s condition, diagnoses and treatment plan based on the information available to me at this time. I have answered questions and addressed any concerns. The patient has a good  understanding of the patient´s diagnosis, condition, and treatment plan as can be expected at this point. The vital signs have been stable.  The patient´s condition is stable and appropriate for discharge from the emergency department.      The patient will pursue further outpatient evaluation with the primary care physician or other designated or consulting physician as outlined in the discharge instructions. They are agreeable to this plan of care and follow-up instructions have been explained in detail. The patient has received these instructions in written format and has expressed an understanding of the discharge instructions. The patient is aware that any significant change in condition or worsening of symptoms should prompt an immediate return to this or the closest emergency department or call to 911.    Final diagnoses:   Cervical radiculopathy        ED Disposition       ED Disposition   Discharge    Condition   Stable    Comment   --               This medical record created using voice recognition software.             Sami Bran MD  04/23/25 [id number]

## 2025-04-25 ENCOUNTER — OFFICE VISIT (OUTPATIENT)
Dept: SURGERY | Facility: CLINIC | Age: 60
End: 2025-04-25
Payer: MEDICAID

## 2025-04-25 VITALS — BODY MASS INDEX: 22.43 KG/M2 | WEIGHT: 148 LBS | RESPIRATION RATE: 20 BRPM | HEIGHT: 68 IN

## 2025-04-25 DIAGNOSIS — N43.3 HYDROCELE IN ADULT: Primary | ICD-10-CM

## 2025-04-25 DIAGNOSIS — L72.3 SEBACEOUS CYST: ICD-10-CM

## 2025-04-25 NOTE — PROGRESS NOTES
Inpatient History and Physical Surgical Orders    Preadmission Location:   Preadmission Time:  Facility:  Surgery Date:  Surgery Time:  Preadmission Test date:     Chief Complaint  Outpatient History and Physical / Surgical Orders    Primary Care Provider: Rekha Quevedo APRN    Referring Provider: Rekha Quevedo A*    Subjective      Patient Name: Bentley Martínez : 1965    HPI  The patient is a 59-year-old gentleman that I have taken care of in the past.  He is status post a robotic right inguinal hernia repair about 4 years ago.  He actually came in today to talk about a cyst on his posterior neck.  He has a small sebaceous cyst there that is gotten a little bit larger and he was questioning whether he could have that taken off.  He also notes that he is not having any pain in the right groin but he does have some swelling of his testicle.  I looked back at his ultrasound of the scrotum from  and he did have small bilateral hydroceles at that time.    Past History:  Medical History: has a past medical history of Abdominal pain, Alcohol abuse (2021), Allergic rhinitis due to allergen (10/24/2018), Asthma, Black stool, C. difficile diarrhea, Essential hypertension (10/24/2018), GERD (gastroesophageal reflux disease) (10/24/2018), High cholesterol, History of urinary retention, Hyperlipidemia (10/24/2018), Hypertension, Inguinal hernia, Laryngeal candidiasis, and Voice hoarseness.   Surgical History: has a past surgical history that includes Cyst Removal; Hernia repair; Inguinal Hernia Repair (Right, 2021); Esophagogastroduodenoscopy (N/A, 10/30/2023); and Colonoscopy (N/A, 10/30/2023).   Family History: family history includes Heart disease in his brother, father, and mother.   Social History: reports that he quit smoking about 36 years ago. His smoking use included cigarettes. He started smoking about 48 years ago. He has a 18 pack-year smoking history. He has never been exposed  "to tobacco smoke. He has never used smokeless tobacco. He reports current alcohol use of about 6.0 - 8.0 standard drinks of alcohol per week. He reports current drug use. Drug: Marijuana.  Allergies: Patient has no known allergies.       Current Outpatient Medications:     albuterol sulfate  (90 Base) MCG/ACT inhaler, Inhale 2 puffs Every 4 (Four) Hours As Needed for Wheezing or Shortness of Air., Disp: 8 g, Rfl: 11    amLODIPine (NORVASC) 2.5 MG tablet, Take 1 tablet by mouth Every Night. Indications: High Blood Pressure, Disp: 30 tablet, Rfl: 2    cetirizine (zyrTEC) 10 MG tablet, Take 1 tablet by mouth Daily., Disp: 90 tablet, Rfl: 3    Dulera 200-5 MCG/ACT inhaler, Inhale 2 puffs 2 (Two) Times a Day., Disp: 13 g, Rfl: 11    lisinopril (PRINIVIL,ZESTRIL) 40 MG tablet, Take 1 tablet by mouth Daily. Indications: High Blood Pressure, Disp: 30 tablet, Rfl: 5    lovastatin (MEVACOR) 20 MG tablet, Take 1 tablet by mouth every night at bedtime. Indications: High Amount of Fats in the Blood, Disp: 30 tablet, Rfl: 5    montelukast (SINGULAIR) 10 MG tablet, Take 1 tablet by mouth every night at bedtime., Disp: 90 tablet, Rfl: 3    pantoprazole (PROTONIX) 40 MG EC tablet, Take 1 tablet by mouth Daily. Indications: Gastroesophageal Reflux Disease, Disp: 30 tablet, Rfl: 5    predniSONE (DELTASONE) 50 MG tablet, Take 1 tablet by mouth Daily for 5 days., Disp: 5 tablet, Rfl: 0    simethicone (Gas-X) 80 MG chewable tablet, Chew 1 tablet Every 6 (Six) Hours As Needed for Flatulence., Disp: 30 tablet, Rfl: 5    Spiriva HandiHaler 18 MCG per inhalation capsule, Place 1 capsule into inhaler and inhale Daily., Disp: 30 capsule, Rfl: 11    vitamin B-12 (CYANOCOBALAMIN) 1000 MCG tablet, Take 1 tablet by mouth Daily. Indications: Inadequate Vitamin B12 (Patient taking differently: Take 1 tablet by mouth Daily.), Disp: 30 tablet, Rfl: 5       Objective   Vital Signs:   Resp 20   Ht 172.7 cm (68\")   Wt 67.1 kg (148 lb)   BMI " 22.50 kg/m²       Physical Exam  Vitals and nursing note reviewed.   Constitutional:       Appearance: Normal appearance. The patient is well-developed.   Cardiovascular:      Rate and Rhythm: Normal rate and regular rhythm.   Pulmonary:      Effort: Pulmonary effort is normal.      Breath sounds: Normal air entry.   Abdominal:      General: Bowel sounds are normal.      Palpations: Abdomen is soft.      Skin:     General: Skin is warm and dry.   Neurological:      Mental Status: The patient is alert and oriented to person, place, and time.      Motor: Motor function is intact.   Psychiatric:         Mood and Affect: Mood normal.   Neck: He has a 1 cm sebaceous cyst on his posterior neck that does not look infected  Groin: I do not feel a definite recurrent hernia in the right groin.  His right testicle may be a little bit swollen but it is not markedly so.  Result Review :               Assessment and Plan   Diagnoses and all orders for this visit:    1. Hydrocele in adult (Primary)    2. Sebaceous cyst    I told Mr. Mendez that I am going to order a scrotal ultrasound and see if that hydrocele on the right side is gotten larger.  He does not feel terribly large today on physical exam but he feels like it is larger at other times.  We will also set him up to come back and have an office procedure to remove the cyst on his posterior neck.    I  Orlando Leon MD  04/25/2025

## 2025-05-07 NOTE — PROGRESS NOTES
Chief Complaint  Hypertension and Allergies    Patient or patient representative verbalized consent for the use of Ambient Listening during the visit with  RAY Britt for chart documentation. 5/8/2025  08:08 EDT    Subjective            Bentley Martínez is a 59 y.o. male who presents to [facility]   History of Present Illness    History of Present Illness  The patient is here for a 1-month follow-up on uncontrolled hypertension.    He reports that his blood pressure was recorded as 118/74 prior to sleep the previous night. He has not yet taken his antihypertensive medication today due to fasting. His home blood pressure readings are typically within normal range at night but tend to elevate in the morning upon waking. His current regimen includes lisinopril 40 mg in the morning and amlodipine 2.5 mg at night. He has been under the care of a hypertension clinic.    He has been experiencing bright yellow phlegm for the past 2 weeks, although he does not report feeling unwell. He has not been coughing but does report occasional sneezing and watery eyes, which he attributes to allergies. He is considering a consultation with an allergist to identify potential allergens. He is currently taking allergy medication.    He went to the ER again for neck pain. They performed several lab tests and an EKG, which was normal. He was diagnosed with a pulled muscle and given a 5-day supply of prednisone, which he completed. He reports improvement in his symptoms. The pain initially occurred about an hour after waking and drinking coffee, prompting a visit to [facility], which referred him to the emergency room.    He needs his inhalers refilled but has not seen a pulmonologist recently. He cannot remember when he is due for a follow-up. He is scheduled to have a cyst removed from his neck tomorrow and will have his hernia checked, suspecting he has a hydrocele. He is scheduled for  "carpal tunnel surgery on 05/30/2025.      Tobacco Use: Medium Risk (5/8/2025)    Patient History     Smoking Tobacco Use: Former     Smokeless Tobacco Use: Never     Passive Exposure: Never      E-cigarette/Vaping    E-cigarette/Vaping Use Never User     Passive Exposure No     Counseling Given No      E-cigarette/Vaping Substances    Nicotine No     THC No     CBD No     Flavoring No      E-cigarette/Vaping Devices    Disposable No     Pre-filled or Refillable Cartridge No     Refillable Tank No     Pre-filled Pod No        Alcohol Use: Alcohol Misuse (3/22/2025)    AUDIT-C     Frequency of Alcohol Consumption: 4 or more times a week     Average Number of Drinks: 7 to 9     Frequency of Binge Drinking: Daily or almost daily         Objective   Vital Signs:   Vitals:    05/08/25 0753 05/08/25 0756   BP: (!) 184/92 172/96   BP Location: Left arm    Patient Position: Sitting    Cuff Size: Adult    Pulse: 58    Temp: 96.8 °F (36 °C)    SpO2: 100%    Weight: 66.1 kg (145 lb 12.8 oz)    Height: 172.7 cm (68\")    PainSc: 0-No pain      Body mass index is 22.17 kg/m².    Wt Readings from Last 3 Encounters:   05/08/25 66.1 kg (145 lb 12.8 oz)   04/25/25 67.1 kg (148 lb)   04/23/25 67.6 kg (149 lb)     BP Readings from Last 3 Encounters:   05/08/25 172/96   04/23/25 156/91   04/23/25 170/85       Health Maintenance   Topic Date Due    ZOSTER VACCINE (2 of 3) 06/02/2025    INFLUENZA VACCINE  07/01/2025    PROSTATE CANCER SCREENING  04/07/2026    ANNUAL PHYSICAL  04/07/2026    LIPID PANEL  04/07/2026    TDAP/TD VACCINES (2 - Td or Tdap) 11/03/2032    COLORECTAL CANCER SCREENING  10/30/2033    HEPATITIS C SCREENING  Completed    COVID-19 Vaccine  Completed    Pneumococcal Vaccine 50+  Completed       /96   Pulse 58   Temp 96.8 °F (36 °C)   Ht 172.7 cm (68\")   Wt 66.1 kg (145 lb 12.8 oz)   SpO2 100%   BMI 22.17 kg/m²       Current Outpatient Medications:     albuterol sulfate  (90 Base) MCG/ACT inhaler, Inhale " 2 puffs Every 4 (Four) Hours As Needed for Wheezing or Shortness of Air., Disp: 8 g, Rfl: 11    amLODIPine (NORVASC) 5 MG tablet, Take 1 tablet by mouth Every Night. Indications: High Blood Pressure, Disp: 90 tablet, Rfl: 0    cetirizine (zyrTEC) 10 MG tablet, Take 1 tablet by mouth Daily., Disp: 90 tablet, Rfl: 3    Dulera 200-5 MCG/ACT inhaler, Inhale 2 puffs 2 (Two) Times a Day., Disp: 13 g, Rfl: 11    lisinopril (PRINIVIL,ZESTRIL) 40 MG tablet, Take 1 tablet by mouth Daily. Indications: High Blood Pressure, Disp: 30 tablet, Rfl: 5    lovastatin (MEVACOR) 20 MG tablet, Take 1 tablet by mouth every night at bedtime. Indications: High Amount of Fats in the Blood, Disp: 30 tablet, Rfl: 5    montelukast (SINGULAIR) 10 MG tablet, Take 1 tablet by mouth every night at bedtime., Disp: 90 tablet, Rfl: 3    pantoprazole (PROTONIX) 40 MG EC tablet, Take 1 tablet by mouth Daily. Indications: Gastroesophageal Reflux Disease, Disp: 30 tablet, Rfl: 5    simethicone (Gas-X) 80 MG chewable tablet, Chew 1 tablet Every 6 (Six) Hours As Needed for Flatulence., Disp: 30 tablet, Rfl: 5    Spiriva HandiHaler 18 MCG per inhalation capsule, Place 1 capsule into inhaler and inhale Daily., Disp: 30 capsule, Rfl: 11    vitamin B-12 (CYANOCOBALAMIN) 1000 MCG tablet, Take 1 tablet by mouth Daily. Indications: Inadequate Vitamin B12 (Patient taking differently: Take 1 tablet by mouth Daily.), Disp: 30 tablet, Rfl: 5   Past Medical History:   Diagnosis Date    Abdominal pain     Alcohol abuse 01/28/2021    Allergic rhinitis due to allergen 10/24/2018    Asthma     Black stool     C. difficile diarrhea     Essential hypertension 10/24/2018    GERD (gastroesophageal reflux disease) 10/24/2018    High cholesterol     History of urinary retention     post op    Hyperlipidemia 10/24/2018    Hypertension     Inguinal hernia     Laryngeal candidiasis     Voice hoarseness         Physical Exam  Vitals reviewed.   Constitutional:       Appearance:  Normal appearance. He is well-developed.   HENT:      Right Ear: Tympanic membrane, ear canal and external ear normal. A middle ear effusion is present.      Left Ear: Tympanic membrane, ear canal and external ear normal. A middle ear effusion is present.      Mouth/Throat:      Mouth: Mucous membranes are moist.      Pharynx: Postnasal drip present. No pharyngeal swelling, oropharyngeal exudate or posterior oropharyngeal erythema.   Neck:      Thyroid: No thyroid mass, thyromegaly or thyroid tenderness.   Cardiovascular:      Rate and Rhythm: Normal rate and regular rhythm.      Heart sounds: No murmur heard.     No friction rub. No gallop.   Pulmonary:      Effort: Pulmonary effort is normal.      Breath sounds: Normal breath sounds. No wheezing or rhonchi.   Lymphadenopathy:      Cervical: No cervical adenopathy.   Skin:     General: Skin is warm and dry.   Neurological:      Mental Status: He is alert and oriented to person, place, and time.      Cranial Nerves: No cranial nerve deficit.   Psychiatric:         Mood and Affect: Mood and affect normal.         Behavior: Behavior normal.         Thought Content: Thought content normal. Thought content does not include homicidal or suicidal ideation.         Judgment: Judgment normal.       Physical Exam        Result Review :    The following data was reviewed by: RAY Britt on 05/08/2025:           XR Chest 1 View  Result Date: 4/23/2025  Impression: Minor atelectasis in the right midlung. Electronically Signed: Mat Kim MD  4/23/2025 11:30 AM EDT  Workstation ID: [id number]    XR Chest 1 View  Result Date: 3/21/2025  Impression: Mildly increased prominence of central pulmonary vasculature/apparent perihilar interstitial opacities, which may be related to differences in technique, mild pulmonary edema, or viral/atypical infection. Electronically Signed: Isiah Lopez  3/21/2025 7:45 PM EDT  Workstation ID: [id number]    CT Head Without  Contrast  Result Date: 3/21/2025  Impression: No acute intracranial findings. 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 2.Mild soft tissue prominence at the interphalangeal joints. 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 2.Mild soft tissue prominence at the interphalangeal joints. Electronically Signed: Tyson Pichardo  3/12/2025 5:12 PM EDT  Workstation ID: [id number]      Results  Labs   - Strep swab in ER: Negative    Imaging   - Chest x-ray: Normal    Assessment & Plan  Essential hypertension      Orders:    amLODIPine (NORVASC) 5 MG tablet; Take 1 tablet by mouth Every Night. Indications: High Blood Pressure    Phlegm in throat    Orders:    Respiratory Culture - Sputum, Cough; Future    Seasonal allergic rhinitis, unspecified trigger    Orders:    Ambulatory Referral to Allergy       Assessment & Plan  1. Hypertension.  - Blood pressure remains elevated, particularly in the mornings.  - Currently on lisinopril 40 mg and amlodipine 2.5 mg.  - Dosage of amlodipine will be increased to 5 mg every evening to help manage morning blood pressure levels.  He is instructed to continue lisinopril 40 mg every morning.  - Advised to continue monitoring blood pressure at home and keep a log for review during the next visit.    2. Yellow phlegm.  - Experiencing bright yellow phlegm for the past 2 weeks but does not feel unwell otherwise.  - ER lab results were normal.  - A sputum culture will be ordered to investigate the cause of the  yellow phlegm.  - Instructed to collect the phlegm in the morning and drop off the sample at the hospital.    3. Allergic rhinitis.  - Reports occasional sneezing and watery eyes, likely due to allergies.  - Physical exam findings include watery eyes postnasal drainage.  - Discussed the possibility of allergies related to tree pollen.  - A referral to an allergist will be made to determine specific allergens and manage symptoms more effectively.    Follow-up  - The patient will follow up in 3 months.      BMI is within normal parameters. No other follow-up for BMI required.        Diagnosis Plan   1. Essential hypertension  amLODIPine (NORVASC) 5 MG tablet      2. Phlegm in throat  Respiratory Culture - Sputum, Cough      3. Seasonal allergic rhinitis, unspecified trigger  Ambulatory Referral to Allergy            FOLLOW UP  Return in about 3 months (around 8/8/2025).  Patient was given instructions and counseling regarding his condition or for health maintenance advice. Please see specific information pulled into the AVS if appropriate.       CURRENT & DISCONTINUED MEDICATIONS  Current Outpatient Medications   Medication Instructions    albuterol sulfate  (90 Base) MCG/ACT inhaler 2 puffs, Inhalation, Every 4 Hours PRN    amLODIPine (NORVASC) 5 mg, Oral, Nightly    cetirizine (ZYRTEC) 10 mg, Oral, Daily    Dulera 200-5 MCG/ACT inhaler 2 puffs, Inhalation, 2 Times Daily    lisinopril (PRINIVIL,ZESTRIL) 40 mg, Oral, Daily    lovastatin (MEVACOR) 20 mg, Oral, Every Night at Bedtime    montelukast (SINGULAIR) 10 mg, Oral, Every Night at Bedtime    pantoprazole (PROTONIX) 40 mg, Oral, Daily    simethicone (GAS-X) 80 mg, Oral, Every 6 Hours PRN    Spiriva HandiHaler 18 MCG per inhalation capsule 1 capsule, Inhalation, Daily    vitamin B-12 (CYANOCOBALAMIN) 1,000 mcg, Oral, Daily       Medications Discontinued During This Encounter   Medication Reason    amLODIPine (NORVASC) 2.5 MG tablet Reorder        Parts of  this note are electronic transcriptions/translations of spoken language to printed text using the Dragon Dictation system.    RAY Britt  05/08/25  12:30 EDT

## 2025-05-08 ENCOUNTER — OFFICE VISIT (OUTPATIENT)
Dept: FAMILY MEDICINE CLINIC | Facility: CLINIC | Age: 60
End: 2025-05-08
Payer: MEDICAID

## 2025-05-08 VITALS
BODY MASS INDEX: 22.1 KG/M2 | SYSTOLIC BLOOD PRESSURE: 172 MMHG | TEMPERATURE: 96.8 F | WEIGHT: 145.8 LBS | OXYGEN SATURATION: 100 % | HEART RATE: 58 BPM | HEIGHT: 68 IN | DIASTOLIC BLOOD PRESSURE: 96 MMHG

## 2025-05-08 DIAGNOSIS — R09.89 PHLEGM IN THROAT: ICD-10-CM

## 2025-05-08 DIAGNOSIS — I10 ESSENTIAL HYPERTENSION: Primary | ICD-10-CM

## 2025-05-08 DIAGNOSIS — J30.2 SEASONAL ALLERGIC RHINITIS, UNSPECIFIED TRIGGER: ICD-10-CM

## 2025-05-08 PROCEDURE — 3077F SYST BP >= 140 MM HG: CPT | Performed by: NURSE PRACTITIONER

## 2025-05-08 PROCEDURE — 1126F AMNT PAIN NOTED NONE PRSNT: CPT | Performed by: NURSE PRACTITIONER

## 2025-05-08 PROCEDURE — 3080F DIAST BP >= 90 MM HG: CPT | Performed by: NURSE PRACTITIONER

## 2025-05-08 PROCEDURE — 1160F RVW MEDS BY RX/DR IN RCRD: CPT | Performed by: NURSE PRACTITIONER

## 2025-05-08 PROCEDURE — 1159F MED LIST DOCD IN RCRD: CPT | Performed by: NURSE PRACTITIONER

## 2025-05-08 PROCEDURE — 99214 OFFICE O/P EST MOD 30 MIN: CPT | Performed by: NURSE PRACTITIONER

## 2025-05-08 RX ORDER — AMLODIPINE BESYLATE 5 MG/1
5 TABLET ORAL NIGHTLY
Qty: 90 TABLET | Refills: 0 | Status: SHIPPED | OUTPATIENT
Start: 2025-05-08

## 2025-05-08 NOTE — ASSESSMENT & PLAN NOTE
Orders:    amLODIPine (NORVASC) 5 MG tablet; Take 1 tablet by mouth Every Night. Indications: High Blood Pressure

## 2025-05-09 ENCOUNTER — PROCEDURE VISIT (OUTPATIENT)
Dept: SURGERY | Facility: CLINIC | Age: 60
End: 2025-05-09
Payer: MEDICAID

## 2025-05-09 ENCOUNTER — LAB (OUTPATIENT)
Dept: LAB | Facility: HOSPITAL | Age: 60
End: 2025-05-09
Payer: MEDICAID

## 2025-05-09 VITALS — HEIGHT: 68 IN | WEIGHT: 146.4 LBS | BODY MASS INDEX: 22.19 KG/M2

## 2025-05-09 DIAGNOSIS — R09.89 PHLEGM IN THROAT: ICD-10-CM

## 2025-05-09 DIAGNOSIS — L72.3 SEBACEOUS CYST: Primary | ICD-10-CM

## 2025-05-09 PROCEDURE — 87205 SMEAR GRAM STAIN: CPT

## 2025-05-09 PROCEDURE — 88304 TISSUE EXAM BY PATHOLOGIST: CPT | Performed by: SURGERY

## 2025-05-09 PROCEDURE — 87070 CULTURE OTHR SPECIMN AEROBIC: CPT

## 2025-05-09 NOTE — PROGRESS NOTES
Inpatient History and Physical Surgical Orders    Preadmission Location:   Preadmission Time:  Facility:  Surgery Date:  Surgery Time:  Preadmission Test date:     Chief Complaint  Outpatient History and Physical / Surgical Orders    Primary Care Provider: Rekha Quevedo APRN    Referring Provider: No ref. provider found    Subjective      Patient Name: Bentley Martínez : 1965    HPI  The patient is a very nice 59-year-old gentleman who came back for follow-up today to have a sebaceous cyst removed from his posterior neck.  He had no complaints today.    Past History:  Medical History: has a past medical history of Abdominal pain, Alcohol abuse (2021), Allergic rhinitis due to allergen (10/24/2018), Asthma, Black stool, C. difficile diarrhea, Essential hypertension (10/24/2018), GERD (gastroesophageal reflux disease) (10/24/2018), High cholesterol, History of urinary retention, Hyperlipidemia (10/24/2018), Hypertension, Inguinal hernia, Laryngeal candidiasis, and Voice hoarseness.   Surgical History: has a past surgical history that includes Cyst Removal; Hernia repair; Inguinal Hernia Repair (Right, 2021); Esophagogastroduodenoscopy (N/A, 10/30/2023); and Colonoscopy (N/A, 10/30/2023).   Family History: family history includes Heart disease in his brother, father, and mother.   Social History: reports that he quit smoking about 36 years ago. His smoking use included cigarettes. He started smoking about 48 years ago. He has a 18 pack-year smoking history. He has never been exposed to tobacco smoke. He has never used smokeless tobacco. He reports current alcohol use of about 6.0 - 8.0 standard drinks of alcohol per week. He reports current drug use. Drug: Marijuana.  Allergies: Patient has no known allergies.       Current Outpatient Medications:   •  albuterol sulfate  (90 Base) MCG/ACT inhaler, Inhale 2 puffs Every 4 (Four) Hours As Needed for Wheezing or Shortness of Air., Disp: 8 g,  "Rfl: 11  •  amLODIPine (NORVASC) 5 MG tablet, Take 1 tablet by mouth Every Night. Indications: High Blood Pressure, Disp: 90 tablet, Rfl: 0  •  cetirizine (zyrTEC) 10 MG tablet, Take 1 tablet by mouth Daily., Disp: 90 tablet, Rfl: 3  •  Dulera 200-5 MCG/ACT inhaler, Inhale 2 puffs 2 (Two) Times a Day., Disp: 13 g, Rfl: 11  •  lisinopril (PRINIVIL,ZESTRIL) 40 MG tablet, Take 1 tablet by mouth Daily. Indications: High Blood Pressure, Disp: 30 tablet, Rfl: 5  •  lovastatin (MEVACOR) 20 MG tablet, Take 1 tablet by mouth every night at bedtime. Indications: High Amount of Fats in the Blood, Disp: 30 tablet, Rfl: 5  •  montelukast (SINGULAIR) 10 MG tablet, Take 1 tablet by mouth every night at bedtime., Disp: 90 tablet, Rfl: 3  •  pantoprazole (PROTONIX) 40 MG EC tablet, Take 1 tablet by mouth Daily. Indications: Gastroesophageal Reflux Disease, Disp: 30 tablet, Rfl: 5  •  simethicone (Gas-X) 80 MG chewable tablet, Chew 1 tablet Every 6 (Six) Hours As Needed for Flatulence., Disp: 30 tablet, Rfl: 5  •  Spiriva HandiHaler 18 MCG per inhalation capsule, Place 1 capsule into inhaler and inhale Daily., Disp: 30 capsule, Rfl: 11  •  vitamin B-12 (CYANOCOBALAMIN) 1000 MCG tablet, Take 1 tablet by mouth Daily. Indications: Inadequate Vitamin B12 (Patient taking differently: Take 1 tablet by mouth Daily.), Disp: 30 tablet, Rfl: 5       Objective   Vital Signs:   Ht 172.7 cm (68\")   Wt 66.4 kg (146 lb 6.4 oz)   BMI 22.26 kg/m²       Physical Exam  Vitals and nursing note reviewed.   Constitutional:       Appearance: Normal appearance. The patient is well-developed.   Cardiovascular:      Rate and Rhythm: Normal rate and regular rhythm.   Pulmonary:      Effort: Pulmonary effort is normal.      Breath sounds: Normal air entry.   Abdominal:      General: Bowel sounds are normal.      Palpations: Abdomen is soft.      Skin:     General: Skin is warm and dry.   Neurological:      Mental Status: The patient is alert and oriented to " person, place, and time.      Motor: Motor function is intact.   Psychiatric:         Mood and Affect: Mood normal.   Neck: 1 cm neck cyst    Result Review :  Validation General Procedure    Date/Time: 5/9/2025 11:37 AM    Performed by: Orlando Leon MD  Authorized by: Orlando Leon MD  Preparation: Patient was prepped and draped in the usual sterile fashion.  Local anesthesia used: yes  Anesthesia: local infiltration    Anesthesia:  Local anesthesia used: yes  Local Anesthetic: lidocaine 1% without epinephrine  Anesthetic total: 6 mL    Sedation:  Patient sedated: no    Patient tolerance: patient tolerated the procedure well with no immediate complications  Comments: The patient was taken to her procedure room and placed on our procedure table in prone position.  His posterior neck was prepped and draped sterilely.  He had an approximately 1 cm sebaceous cyst noted.  The skin overlying this was anesthetized with 1% lidocaine.  The cyst was then excised with the scalpel.  The cyst was sent for specimen and the skin was then closed with interrupted 3-0 nylon sutures.  Sterile dressings were applied and he was discharged home in stable condition.                Assessment and Plan   Diagnoses and all orders for this visit:    1. Sebaceous cyst (Primary)    We went ahead and remove that here in the office today.  I will see him back in 2 weeks to remove his sutures.    I  Orlando Leon MD  05/09/2025

## 2025-05-11 LAB
BACTERIA SPEC RESP CULT: NORMAL
GRAM STN SPEC: NORMAL
GRAM STN SPEC: NORMAL

## 2025-05-19 ENCOUNTER — TELEPHONE (OUTPATIENT)
Dept: ORTHOPEDIC SURGERY | Facility: CLINIC | Age: 60
End: 2025-05-19
Payer: MEDICAID

## 2025-05-19 NOTE — TELEPHONE ENCOUNTER
SPOKE WITH PATIENT, PER PATIENT HE IS STUCK OUT OF TOWN AND NEEDS TO RS SX THAT IS PLANNED FOR TOMORROW 5/20/2025. SX RS'D FROM 5/20/2025 TO 6/24/2025. PATIENT IS AWARE THE HOSPITAL WILL CALL THE DAY PRIOR TO SX WITH THE ARRIVAL TIME. PATIENT VOICED UNDERSTANDING.

## 2025-05-19 NOTE — TELEPHONE ENCOUNTER
Caller: Bentley Martínez    Relationship to patient: Self    Best call back number: [phone]    Chief complaint: LEFT CARPAL TUNNEL    Type of visit: SURGERY    Requested date: ASAP     If rescheduling, when is the original appointment: 5.20.25     Additional notes: PT NEEDING TO CANCEL SX TOMORROW AND R/S. PLEASE ADVISE.

## 2025-05-21 NOTE — TELEPHONE ENCOUNTER
04/16/2025 Pt stated unemployed, has been looking for work for approx. 1 year.  Referred to [employer] [facility] center for expungement assistance, transportation assistance and other class/resources to help him get back into the workforce.  Pt also expressed challenges with getting enough food.  [place] box was given to patient.  Pt was also given [facility] Resource guide with list of food pantries and blessing boxes in the community.  Pt was notified of blessing box right outside clinic.

## 2025-05-23 ENCOUNTER — OFFICE VISIT (OUTPATIENT)
Dept: SURGERY | Facility: CLINIC | Age: 60
End: 2025-05-23
Payer: MEDICAID

## 2025-05-23 VITALS
RESPIRATION RATE: 18 BRPM | OXYGEN SATURATION: 98 % | HEART RATE: 71 BPM | SYSTOLIC BLOOD PRESSURE: 171 MMHG | WEIGHT: 146 LBS | DIASTOLIC BLOOD PRESSURE: 111 MMHG | BODY MASS INDEX: 22.13 KG/M2 | HEIGHT: 68 IN

## 2025-05-23 DIAGNOSIS — L72.3 SEBACEOUS CYST: Primary | ICD-10-CM

## 2025-05-23 PROCEDURE — 99024 POSTOP FOLLOW-UP VISIT: CPT | Performed by: SURGERY

## 2025-05-23 NOTE — PROGRESS NOTES
Chief Complaint  Cyst (Sebaceous cyst)    Subjective          Bentley Martínez presents to [facility]  History of Present Illness    Bentley Martínez is a 59 y.o. male  who presents today for a postoperative visit.     Patient is here for a follow-up after an excision of a posterior neck cyst.  He is doing fine and had no complaints today.    Past History:  Medical History: has a past medical history of Abdominal pain, Alcohol abuse (01/28/2021), Allergic rhinitis due to allergen (10/24/2018), Asthma, Black stool, C. difficile diarrhea, Carpal tunnel syndrome, Cervical radiculopathy, Essential hypertension (10/24/2018), GERD (gastroesophageal reflux disease) (10/24/2018), High cholesterol, History of urinary retention, Hyperlipidemia (10/24/2018), Hypertension, Inguinal hernia, Laryngeal candidiasis, and Voice hoarseness.   Surgical History: has a past surgical history that includes Cyst Removal; Hernia repair; Inguinal Hernia Repair (Right, 8/11/2021); Esophagogastroduodenoscopy (N/A, 10/30/2023); and Colonoscopy (N/A, 10/30/2023).   Family History: family history includes Heart disease in his brother, father, and mother.   Social History: reports that he quit smoking about 36 years ago. His smoking use included cigarettes. He started smoking about 48 years ago. He has a 18 pack-year smoking history. He has never been exposed to tobacco smoke. He has never used smokeless tobacco. He reports current alcohol use of about 42.0 standard drinks of alcohol per week. He reports current drug use. Drug: Marijuana.  Allergies: Patient has no known allergies.       Current Outpatient Medications:     albuterol sulfate  (90 Base) MCG/ACT inhaler, Inhale 2 puffs Every 4 (Four) Hours As Needed for Wheezing or Shortness of Air., Disp: 8 g, Rfl: 11    amLODIPine (NORVASC) 5 MG tablet, Take 1 tablet by mouth Every Night. Indications: High Blood Pressure, Disp: 90 tablet, Rfl: 0    cetirizine  "(zyrTEC) 10 MG tablet, Take 1 tablet by mouth Daily., Disp: 90 tablet, Rfl: 3    Dulera 200-5 MCG/ACT inhaler, Inhale 2 puffs 2 (Two) Times a Day., Disp: 13 g, Rfl: 11    lisinopril (PRINIVIL,ZESTRIL) 40 MG tablet, Take 1 tablet by mouth Daily. Indications: High Blood Pressure, Disp: 30 tablet, Rfl: 5    lovastatin (MEVACOR) 20 MG tablet, Take 1 tablet by mouth every night at bedtime. Indications: High Amount of Fats in the Blood, Disp: 30 tablet, Rfl: 5    montelukast (SINGULAIR) 10 MG tablet, Take 1 tablet by mouth every night at bedtime., Disp: 90 tablet, Rfl: 3    pantoprazole (PROTONIX) 40 MG EC tablet, Take 1 tablet by mouth Daily. Indications: Gastroesophageal Reflux Disease, Disp: 30 tablet, Rfl: 5    simethicone (Gas-X) 80 MG chewable tablet, Chew 1 tablet Every 6 (Six) Hours As Needed for Flatulence., Disp: 30 tablet, Rfl: 5    Spiriva HandiHaler 18 MCG per inhalation capsule, Place 1 capsule into inhaler and inhale Daily., Disp: 30 capsule, Rfl: 11    vitamin B-12 (CYANOCOBALAMIN) 1000 MCG tablet, Take 1 tablet by mouth Daily. Indications: Inadequate Vitamin B12 (Patient taking differently: Take 1 tablet by mouth Daily.), Disp: 30 tablet, Rfl: 5       Physical Exam  His incision looks good with no evidence of infection.  Objective     Vital Signs:   BP (!) 171/111   Pulse 71   Resp 18   Ht 172.7 cm (67.99\")   Wt 66.2 kg (146 lb)   SpO2 98%   BMI 22.20 kg/m²              Assessment and Plan    Diagnoses and all orders for this visit:    1. Sebaceous cyst (Primary)    I will see him back on an as needed basis.  I have asked him to call me if he were to have any further questions or concerns.      "

## 2025-05-23 NOTE — LETTER
May 23, 2025     RAY Britt  [address] [place] [address]    Patient: Bentley Martínez   YOB: 1965   Date of Visit: 5/23/2025     Dear RAY Britt:       Thank you for referring Bentley Martínez to me for evaluation. Below are the relevant portions of my assessment and plan of care.    If you have questions, please do not hesitate to call me. I look forward to following Bentley along with you.         Sincerely,        Orlando Leon MD        CC: No Recipients    Orlando Leon MD  05/23/25 0926  Sign when Signing Visit  Chief Complaint  Cyst (Sebaceous cyst)    Subjective         Bentley Martínez presents to [facility]  History of Present Illness    Bentley Martínez is a 59 y.o. male  who presents today for a postoperative visit.     Patient is here for a follow-up after an excision of a posterior neck cyst.  He is doing fine and had no complaints today.    Past History:  Medical History: has a past medical history of Abdominal pain, Alcohol abuse (01/28/2021), Allergic rhinitis due to allergen (10/24/2018), Asthma, Black stool, C. difficile diarrhea, Carpal tunnel syndrome, Cervical radiculopathy, Essential hypertension (10/24/2018), GERD (gastroesophageal reflux disease) (10/24/2018), High cholesterol, History of urinary retention, Hyperlipidemia (10/24/2018), Hypertension, Inguinal hernia, Laryngeal candidiasis, and Voice hoarseness.   Surgical History: has a past surgical history that includes Cyst Removal; Hernia repair; Inguinal Hernia Repair (Right, 8/11/2021); Esophagogastroduodenoscopy (N/A, 10/30/2023); and Colonoscopy (N/A, 10/30/2023).   Family History: family history includes Heart disease in his brother, father, and mother.   Social History: reports that he quit smoking about 36 years ago. His smoking use included cigarettes. He started smoking about 48 years ago. He has a 18 pack-year smoking history. He has never been  "exposed to tobacco smoke. He has never used smokeless tobacco. He reports current alcohol use of about 42.0 standard drinks of alcohol per week. He reports current drug use. Drug: Marijuana.  Allergies: Patient has no known allergies.       Current Outpatient Medications:   •  albuterol sulfate  (90 Base) MCG/ACT inhaler, Inhale 2 puffs Every 4 (Four) Hours As Needed for Wheezing or Shortness of Air., Disp: 8 g, Rfl: 11  •  amLODIPine (NORVASC) 5 MG tablet, Take 1 tablet by mouth Every Night. Indications: High Blood Pressure, Disp: 90 tablet, Rfl: 0  •  cetirizine (zyrTEC) 10 MG tablet, Take 1 tablet by mouth Daily., Disp: 90 tablet, Rfl: 3  •  Dulera 200-5 MCG/ACT inhaler, Inhale 2 puffs 2 (Two) Times a Day., Disp: 13 g, Rfl: 11  •  lisinopril (PRINIVIL,ZESTRIL) 40 MG tablet, Take 1 tablet by mouth Daily. Indications: High Blood Pressure, Disp: 30 tablet, Rfl: 5  •  lovastatin (MEVACOR) 20 MG tablet, Take 1 tablet by mouth every night at bedtime. Indications: High Amount of Fats in the Blood, Disp: 30 tablet, Rfl: 5  •  montelukast (SINGULAIR) 10 MG tablet, Take 1 tablet by mouth every night at bedtime., Disp: 90 tablet, Rfl: 3  •  pantoprazole (PROTONIX) 40 MG EC tablet, Take 1 tablet by mouth Daily. Indications: Gastroesophageal Reflux Disease, Disp: 30 tablet, Rfl: 5  •  simethicone (Gas-X) 80 MG chewable tablet, Chew 1 tablet Every 6 (Six) Hours As Needed for Flatulence., Disp: 30 tablet, Rfl: 5  •  Spiriva HandiHaler 18 MCG per inhalation capsule, Place 1 capsule into inhaler and inhale Daily., Disp: 30 capsule, Rfl: 11  •  vitamin B-12 (CYANOCOBALAMIN) 1000 MCG tablet, Take 1 tablet by mouth Daily. Indications: Inadequate Vitamin B12 (Patient taking differently: Take 1 tablet by mouth Daily.), Disp: 30 tablet, Rfl: 5       Physical Exam  His incision looks good with no evidence of infection.  Objective    Vital Signs:   BP (!) 171/111   Pulse 71   Resp 18   Ht 172.7 cm (67.99\")   Wt 66.2 kg (146 " lb)   SpO2 98%   BMI 22.20 kg/m²              Assessment and Plan    Diagnoses and all orders for this visit:    1. Sebaceous cyst (Primary)    I will see him back on an as needed basis.  I have asked him to call me if he were to have any further questions or concerns.

## 2025-05-27 LAB
QT INTERVAL: 392 MS
QTC INTERVAL: 417 MS

## 2025-06-23 ENCOUNTER — TELEPHONE (OUTPATIENT)
Dept: ORTHOPEDIC SURGERY | Facility: CLINIC | Age: 60
End: 2025-06-23
Payer: MEDICAID

## 2025-06-23 NOTE — TELEPHONE ENCOUNTER
PATIENT CALLED AND LEFT A VMAIL STATING HE NEEDS TO CX SX FOR 6/24/2025 DUE TO HIM BEING OUT OF TOWN AND WILL CALL BACK TO [facility] WHEN HE RETURNS.     SPOKE WITH [facility] IN SCHEDULING TO CX SX FOR 6/24/2025.

## 2025-08-18 DIAGNOSIS — I10 ESSENTIAL HYPERTENSION: ICD-10-CM

## 2025-08-18 RX ORDER — AMLODIPINE BESYLATE 5 MG/1
5 TABLET ORAL NIGHTLY
Qty: 90 TABLET | Refills: 0 | Status: SHIPPED | OUTPATIENT
Start: 2025-08-18

## 2025-08-25 DIAGNOSIS — J30.9 ALLERGIC RHINITIS, UNSPECIFIED SEASONALITY, UNSPECIFIED TRIGGER: ICD-10-CM

## 2025-08-25 DIAGNOSIS — M79.641 BILATERAL HAND PAIN: ICD-10-CM

## 2025-08-25 DIAGNOSIS — J45.41 MODERATE PERSISTENT ASTHMA WITH ACUTE EXACERBATION: ICD-10-CM

## 2025-08-25 DIAGNOSIS — R79.89 LOW VITAMIN B12 LEVEL: ICD-10-CM

## 2025-08-25 DIAGNOSIS — I10 ESSENTIAL HYPERTENSION: ICD-10-CM

## 2025-08-25 DIAGNOSIS — M79.642 BILATERAL HAND PAIN: ICD-10-CM

## 2025-08-25 RX ORDER — LANOLIN ALCOHOL/MO/W.PET/CERES
1000 CREAM (GRAM) TOPICAL DAILY
Qty: 30 TABLET | Refills: 0 | Status: SHIPPED | OUTPATIENT
Start: 2025-08-25

## 2025-08-25 RX ORDER — CETIRIZINE HYDROCHLORIDE 10 MG/1
10 TABLET ORAL DAILY
Qty: 90 TABLET | Refills: 3 | OUTPATIENT
Start: 2025-08-25

## 2025-08-25 RX ORDER — TIOTROPIUM BROMIDE 18 UG/1
CAPSULE ORAL; RESPIRATORY (INHALATION)
Qty: 30 CAPSULE | Refills: 11 | OUTPATIENT
Start: 2025-08-25

## 2025-08-25 RX ORDER — ALBUTEROL SULFATE 90 UG/1
2 AEROSOL, METERED RESPIRATORY (INHALATION) EVERY 4 HOURS PRN
Qty: 18 G | Refills: 11 | OUTPATIENT
Start: 2025-08-25

## 2025-08-25 RX ORDER — MONTELUKAST SODIUM 10 MG/1
10 TABLET ORAL
Qty: 90 TABLET | Refills: 3 | OUTPATIENT
Start: 2025-08-25

## 2025-08-25 RX ORDER — MOMETASONE FUROATE AND FORMOTEROL FUMARATE DIHYDRATE 200; 5 UG/1; UG/1
2 AEROSOL RESPIRATORY (INHALATION) 2 TIMES DAILY
Qty: 13 G | Refills: 11 | OUTPATIENT
Start: 2025-08-25

## 2025-08-25 RX ORDER — CETIRIZINE HYDROCHLORIDE 10 MG/1
10 TABLET ORAL DAILY
Qty: 30 TABLET | Refills: 0 | Status: SHIPPED | OUTPATIENT
Start: 2025-08-25

## 2025-08-25 RX ORDER — NAPROXEN 500 MG/1
500 TABLET ORAL 2 TIMES DAILY WITH MEALS
Qty: 60 TABLET | Refills: 0 | Status: SHIPPED | OUTPATIENT
Start: 2025-08-25

## 2025-08-25 RX ORDER — HYDROCHLOROTHIAZIDE 25 MG/1
25 TABLET ORAL DAILY
Qty: 30 TABLET | Refills: 0 | Status: SHIPPED | OUTPATIENT
Start: 2025-08-25

## (undated) DEVICE — 3M™ STERI-STRIP™ REINFORCED ADHESIVE SKIN CLOSURES, R1547, 1/2 IN X 4 IN (12 MM X 100 MM), 6 STRIPS/ENVELOPE: Brand: 3M™ STERI-STRIP™

## (undated) DEVICE — SOL IRRG H2O PL/BG 1000ML STRL

## (undated) DEVICE — APPL CHLORAPREP HI/LITE 26ML ORNG

## (undated) DEVICE — SUT MERSILENE POLYSTR UR6 BR 0 75CM GRN

## (undated) DEVICE — SINGLE-USE BIOPSY FORCEPS: Brand: RADIAL JAW 4

## (undated) DEVICE — LINER SURG CANSTR SXN S/RIGD 1500CC

## (undated) DEVICE — SOLIDIFIER LIQLOC PLS 1500CC BT

## (undated) DEVICE — GLV SURG SENSICARE PI ORTHO SZ8 LF STRL

## (undated) DEVICE — ARM DRAPE

## (undated) DEVICE — NON-WOVEN ADHESIVE WOUND DRESSING: Brand: PRIMAPORE ADHESIVE WOUND DRSG 7.2*5CM

## (undated) DEVICE — CANNULA SEAL

## (undated) DEVICE — TIP COVER ACCESSORY

## (undated) DEVICE — STERILE POLYISOPRENE POWDER-FREE SURGICAL GLOVES WITH EMOLLIENT COATING: Brand: PROTEXIS

## (undated) DEVICE — Device

## (undated) DEVICE — DAVINCI-LF: Brand: MEDLINE INDUSTRIES, INC.

## (undated) DEVICE — SLV SCD LEG COMFORT KENDALLSCD MD REPROC

## (undated) DEVICE — CONN JET HYDRA H20 AUXILIARY DISP

## (undated) DEVICE — SYS CLOSE PORTII CARTR/THOMASN XL

## (undated) DEVICE — SOL IRR NACL 0.9PCT BT 1000ML

## (undated) DEVICE — BLCK/BITE BLOX WO/DENTL/RIM W/STRAP 54F

## (undated) DEVICE — TRY FOL URINE METER STATLOCK 16F 5CC

## (undated) DEVICE — ANTIBACTERIAL UNDYED BRAIDED (POLYGLACTIN 910), SYNTHETIC ABSORBABLE SUTURE: Brand: COATED VICRYL

## (undated) DEVICE — ENDOPATH XCEL WITH OPTIVIEW TECHNOLOGY BLADELESS TROCARS WITH STABILITY SLEEVES: Brand: ENDOPATH XCEL OPTIVIEW

## (undated) DEVICE — LAPAROSCOPIC SCISSORS: Brand: EPIX LAPAROSCOPIC SCISSORS

## (undated) DEVICE — Device: Brand: DEFENDO AIR/WATER/SUCTION AND BIOPSY VALVE

## (undated) DEVICE — GOWN,REINFRCE,POLY,SIRUS,BREATH SLV,XXLG: Brand: MEDLINE

## (undated) DEVICE — SOL ANTISTICK CAUTRY ELECTROLUBE LF